# Patient Record
Sex: MALE | Race: WHITE | URBAN - METROPOLITAN AREA
[De-identification: names, ages, dates, MRNs, and addresses within clinical notes are randomized per-mention and may not be internally consistent; named-entity substitution may affect disease eponyms.]

---

## 2018-06-18 ENCOUNTER — INPATIENT (INPATIENT)
Facility: HOSPITAL | Age: 83
LOS: 2 days | Discharge: HOME | End: 2018-06-21
Attending: INTERNAL MEDICINE | Admitting: INTERNAL MEDICINE
Payer: MEDICARE

## 2018-06-18 VITALS
DIASTOLIC BLOOD PRESSURE: 71 MMHG | SYSTOLIC BLOOD PRESSURE: 146 MMHG | OXYGEN SATURATION: 98 % | TEMPERATURE: 98 F | RESPIRATION RATE: 17 BRPM | HEART RATE: 66 BPM | HEIGHT: 70 IN | WEIGHT: 237 LBS

## 2018-06-18 DIAGNOSIS — L98.499 NON-PRESSURE CHRONIC ULCER OF SKIN OF OTHER SITES WITH UNSPECIFIED SEVERITY: ICD-10-CM

## 2018-06-18 DIAGNOSIS — C61 MALIGNANT NEOPLASM OF PROSTATE: ICD-10-CM

## 2018-06-18 DIAGNOSIS — I48.91 UNSPECIFIED ATRIAL FIBRILLATION: ICD-10-CM

## 2018-06-18 DIAGNOSIS — H40.9 UNSPECIFIED GLAUCOMA: ICD-10-CM

## 2018-06-18 DIAGNOSIS — E78.5 HYPERLIPIDEMIA, UNSPECIFIED: ICD-10-CM

## 2018-06-18 DIAGNOSIS — I44.0 ATRIOVENTRICULAR BLOCK, FIRST DEGREE: ICD-10-CM

## 2018-06-18 DIAGNOSIS — I48.1 PERSISTENT ATRIAL FIBRILLATION: ICD-10-CM

## 2018-06-18 LAB
ANION GAP SERPL CALC-SCNC: 3 MMOL/L — LOW (ref 5–17)
APTT BLD: 38.5 SEC — HIGH (ref 27.5–37.4)
BUN SERPL-MCNC: 22 MG/DL — SIGNIFICANT CHANGE UP (ref 7–23)
CALCIUM SERPL-MCNC: 8.8 MG/DL — SIGNIFICANT CHANGE UP (ref 8.5–10.1)
CHLORIDE SERPL-SCNC: 103 MMOL/L — SIGNIFICANT CHANGE UP (ref 96–108)
CO2 SERPL-SCNC: 33 MMOL/L — HIGH (ref 22–31)
CREAT SERPL-MCNC: 1.08 MG/DL — SIGNIFICANT CHANGE UP (ref 0.5–1.3)
GLUCOSE SERPL-MCNC: 99 MG/DL — SIGNIFICANT CHANGE UP (ref 70–99)
HCT VFR BLD CALC: 47.6 % — SIGNIFICANT CHANGE UP (ref 39–50)
HGB BLD-MCNC: 15.3 G/DL — SIGNIFICANT CHANGE UP (ref 13–17)
INR BLD: 1.87 RATIO — HIGH (ref 0.88–1.16)
MAGNESIUM SERPL-MCNC: 2.4 MG/DL — SIGNIFICANT CHANGE UP (ref 1.6–2.6)
MCHC RBC-ENTMCNC: 27.5 PG — SIGNIFICANT CHANGE UP (ref 27–34)
MCHC RBC-ENTMCNC: 32.1 GM/DL — SIGNIFICANT CHANGE UP (ref 32–36)
MCV RBC AUTO: 85.5 FL — SIGNIFICANT CHANGE UP (ref 80–100)
NRBC # BLD: 0 /100 WBCS — SIGNIFICANT CHANGE UP (ref 0–0)
PLATELET # BLD AUTO: 197 K/UL — SIGNIFICANT CHANGE UP (ref 150–400)
POTASSIUM SERPL-MCNC: 4.5 MMOL/L — SIGNIFICANT CHANGE UP (ref 3.5–5.3)
POTASSIUM SERPL-SCNC: 4.5 MMOL/L — SIGNIFICANT CHANGE UP (ref 3.5–5.3)
PROTHROM AB SERPL-ACNC: 20.4 SEC — HIGH (ref 9.8–12.7)
RBC # BLD: 5.57 M/UL — SIGNIFICANT CHANGE UP (ref 4.2–5.8)
RBC # FLD: 14.2 % — SIGNIFICANT CHANGE UP (ref 10.3–14.5)
SODIUM SERPL-SCNC: 139 MMOL/L — SIGNIFICANT CHANGE UP (ref 135–145)
WBC # BLD: 5.87 K/UL — SIGNIFICANT CHANGE UP (ref 3.8–10.5)
WBC # FLD AUTO: 5.87 K/UL — SIGNIFICANT CHANGE UP (ref 3.8–10.5)

## 2018-06-18 PROCEDURE — 93010 ELECTROCARDIOGRAM REPORT: CPT

## 2018-06-18 RX ORDER — FINASTERIDE 5 MG/1
5 TABLET, FILM COATED ORAL DAILY
Qty: 0 | Refills: 0 | Status: DISCONTINUED | OUTPATIENT
Start: 2018-06-18 | End: 2018-06-21

## 2018-06-18 RX ORDER — ATORVASTATIN CALCIUM 80 MG/1
10 TABLET, FILM COATED ORAL AT BEDTIME
Qty: 0 | Refills: 0 | Status: DISCONTINUED | OUTPATIENT
Start: 2018-06-18 | End: 2018-06-21

## 2018-06-18 RX ORDER — DOFETILIDE 0.25 MG/1
500 CAPSULE ORAL
Qty: 0 | Refills: 0 | Status: DISCONTINUED | OUTPATIENT
Start: 2018-06-18 | End: 2018-06-21

## 2018-06-18 RX ORDER — ACETAMINOPHEN 500 MG
650 TABLET ORAL EVERY 6 HOURS
Qty: 0 | Refills: 0 | Status: DISCONTINUED | OUTPATIENT
Start: 2018-06-18 | End: 2018-06-21

## 2018-06-18 RX ORDER — TIMOLOL 0.5 %
1 DROPS OPHTHALMIC (EYE) DAILY
Qty: 0 | Refills: 0 | Status: DISCONTINUED | OUTPATIENT
Start: 2018-06-18 | End: 2018-06-21

## 2018-06-18 RX ORDER — ATORVASTATIN CALCIUM 80 MG/1
10 TABLET, FILM COATED ORAL AT BEDTIME
Qty: 0 | Refills: 0 | Status: DISCONTINUED | OUTPATIENT
Start: 2018-06-18 | End: 2018-06-18

## 2018-06-18 RX ORDER — DOFETILIDE 0.25 MG/1
500 CAPSULE ORAL ONCE
Qty: 0 | Refills: 0 | Status: COMPLETED | OUTPATIENT
Start: 2018-06-18 | End: 2018-06-18

## 2018-06-18 RX ORDER — WARFARIN SODIUM 2.5 MG/1
5 TABLET ORAL DAILY
Qty: 0 | Refills: 0 | Status: DISCONTINUED | OUTPATIENT
Start: 2018-06-18 | End: 2018-06-19

## 2018-06-18 RX ADMIN — DOFETILIDE 500 MICROGRAM(S): 0.25 CAPSULE ORAL at 13:59

## 2018-06-18 RX ADMIN — ATORVASTATIN CALCIUM 10 MILLIGRAM(S): 80 TABLET, FILM COATED ORAL at 21:20

## 2018-06-18 RX ADMIN — Medication 650 MILLIGRAM(S): at 23:45

## 2018-06-18 NOTE — H&P ADULT - HISTORY OF PRESENT ILLNESS
83 M with Hx of Atrial Fibrillation/Atrial Flutter on A/C with Coumadin presented to the hospital for elective ANANT/cardioversion and then admission for inpatient Tikosyn loading.  Patient denies any shortness of breath, chest pain, chest pressure, palpitations, dizziness or lightheadedness.  He underwent the ANANT today with cardioversion to NSR.  He is currently asymptomatic and feels well.  EKG has confirmed conversion to NSR with 1st degree AVB.  He will now require loading with Tikosyn and inpatient admission for monitor of QTc and bradycardia.     PAST MEDICAL HISTORY:  Atrial Fibrillation/Atrial Flutter on A/C with Coumadin  Prostate Cancer, on Hormonal therapy  Hyperlipidemia  Glaucoma  Chronic left leg wound, follows up at the wound care center  Negative outpatient nuclear stress test    PAST SURGICAL HISTORY:  s/p appendectomy    FAMILY HISTORY:   non-contributory to the patient's current presentation 83 M with Hx of Atrial Fibrillation/Atrial Flutter on A/C with Coumadin presented to the hospital for elective ANANT/cardioversion and then admission for inpatient Tikosyn loading.  Patient denies any shortness of breath, chest pain, chest pressure, palpitations, dizziness or lightheadedness.  He underwent the ANANT today with cardioversion to NSR.  He is currently asymptomatic and feels well.  EKG has confirmed conversion to NSR with 1st degree AVB.  He will now require loading with Tikosyn and inpatient admission for monitor of QTc and bradycardia.     PAST MEDICAL HISTORY:  Atrial Fibrillation/Atrial Flutter on A/C with Coumadin  Prostate Cancer, on Hormonal therapy  Hyperlipidemia  Glaucoma  Chronic left leg wound/ulcer, s/p Becca boot, follows up at the wound care center  Negative outpatient nuclear stress test    PAST SURGICAL HISTORY:  s/p appendectomy    FAMILY HISTORY:   non-contributory to the patient's current presentation

## 2018-06-18 NOTE — H&P ADULT - NSHPLABSRESULTS_GEN_ALL_CORE
15.3   5.87  )-----------( 197      ( 18 Jun 2018 10:45 )             47.6     06-18    139  |  103  |  22  ----------------------------<  99  4.5   |  33<H>  |  1.08    Ca    8.8      18 Jun 2018 10:45  Mg     2.4     06-18    PT/INR - ( 18 Jun 2018 10:45 )   PT: 20.4 sec;   INR: 1.87 ratio      PTT - ( 18 Jun 2018 10:45 )  PTT:38.5 sec    EKG at 10:53AM:   Atrial Flutter at 65 bpm, normal axis, normal RW progression, no acute ischemic changes, QTc 405    EKG at 12:48pm after cardioversion:  NSR at 66bpm, normal axis, normal RW progression, 1st degree AVB, QTc 425

## 2018-06-18 NOTE — H&P ADULT - NSHPPHYSICALEXAM_GEN_ALL_CORE
VITALS:  T(F): 97.8 (06-18-18 @ 12:50), Max: 98.2 (06-18-18 @ 10:34)  HR: 64 (06-18-18 @ 13:35) (64 - 70)  BP: 108/72 (06-18-18 @ 13:35) (106/71 - 146/71)  RR: 16 (06-18-18 @ 13:35) (16 - 18)  SpO2: 96% (06-18-18 @ 13:35) (96% - 100%)    PHYSICAL EXAM:    HEENT:  pupils equal and reactive, EOMI, no oropharyngeal lesions, erythema, exudates, oral thrush    NECK:   supple, no carotid bruits, no palpable lymph nodes, no thyromegaly    CV:  +S1, +S2, regular, no murmurs or rubs    RESP:   lungs clear to auscultation bilaterally, no wheezing, rales, rhonchi, good air entry bilaterally    BREAST:  not examined    GI:  abdomen soft, non-tender, non-distended, normal BS, no bruits, no abdominal masses, no palpable masses    RECTAL:  not examined    :  not examined    MSK:   normal muscle tone, no atrophy, no rigidity, no contractions    EXT:   no clubbing, no cyanosis, no edema, no calf pain, swelling or erythema, left leg wrapped with dressing    VASCULAR:  pulses equal and symmetric in the upper and lower extremities    NEURO:  AAOX3, no focal neurological deficits, follows all commands, able to move extremities spontaneously    SKIN:  no lesions, no rashes VITALS:  T(F): 97.8 (06-18-18 @ 12:50), Max: 98.2 (06-18-18 @ 10:34)  HR: 64 (06-18-18 @ 13:35) (64 - 70)  BP: 108/72 (06-18-18 @ 13:35) (106/71 - 146/71)  RR: 16 (06-18-18 @ 13:35) (16 - 18)  SpO2: 96% (06-18-18 @ 13:35) (96% - 100%)    PHYSICAL EXAM:    HEENT:  pupils equal and reactive, EOMI, no oropharyngeal lesions, erythema, exudates, oral thrush    NECK:   supple, no carotid bruits, no palpable lymph nodes, no thyromegaly    CV:  +S1, +S2, regular, no murmurs or rubs    RESP:   lungs clear to auscultation bilaterally, no wheezing, rales, rhonchi, good air entry bilaterally    BREAST:  not examined    GI:  abdomen soft, non-tender, non-distended, normal BS, no bruits, no abdominal masses, no palpable masses    RECTAL:  not examined    :  not examined    MSK:   normal muscle tone, no atrophy, no rigidity, no contractions    EXT:   no clubbing, no cyanosis, no edema, no calf pain, swelling or erythema, left leg wrapped with Becca boot    VASCULAR:  pulses equal and symmetric in the upper and lower extremities    NEURO:  AAOX3, no focal neurological deficits, follows all commands, able to move extremities spontaneously    SKIN:  no lesions, no rashes

## 2018-06-18 NOTE — PROCEDURAL SAFETY CHECKLIST WITH OR WITHOUT SEDATION - NSPREPROCLOCFT_GEN_ALL_CORE
Received pt post procedure. A+OX4, awake, alert and without pain. VSS on cardiac monitor. R Radial band intact. No bleeding, hematoma, brusing, or tenderness noted at site. Safety maintained. Family present. lab

## 2018-06-18 NOTE — H&P ADULT - ASSESSMENT
PERSISTENT ATRIAL FIBRILLATION/FLUTTER, S/P ELECTIVE ANANT/CARDIOVERSION TO NSR, NOW REQUIRES INPATIENT ADMISSION FOR TIKOSYN LOADING AND MONITORING    SUBTHERAPEUTIC INR ON COUMADIN    HYPERLIPIDEMIA    PROSTATE CANCER, ON HORMONAL THERAPY    GLAUCOMA      PLAN:  -  admit to inpatient, tele, hospitalist service  -  daily PT/INR  -  check labs in am  -  EP consult - Dr. Smart  -  resume outpatient medications  -  daily EKG to assess QTc  -  DVT prophylaxis - on Coumadin  -  will likely be in the hospital for 2-3 days  -  Tikosyn loading/dosing/ordering to be done by EP service  -  dressing to the left leg (will assess wound once arrives to floor)    d/w patient, EP NP and cath recovery RN PERSISTENT ATRIAL FIBRILLATION/FLUTTER, S/P ELECTIVE ANANT/CARDIOVERSION TO NSR, NOW REQUIRES INPATIENT ADMISSION FOR TIKOSYN LOADING AND MONITORING    SUBTHERAPEUTIC INR ON COUMADIN    HYPERLIPIDEMIA    PROSTATE CANCER, ON HORMONAL THERAPY    GLAUCOMA    CHRONIC LEFT LEG WOUND/ULCER, S/P BECCA BOOT, PRESENT ON ADMISSION      PLAN:  -  admit to inpatient, tele, hospitalist service  -  daily PT/INR  -  check labs in am  -  EP consult - Dr. Smart  -  resume outpatient medications  -  daily EKG to assess QTc  -  DVT prophylaxis - on Coumadin  -  will likely be in the hospital for 2-3 days  -  Tikosyn loading/dosing/ordering to be done by EP service  -  continue left leg Becca boot, patient has follow up appt at the wound care center on Thursday of this week    d/w patient, EP NP and cath recovery RN

## 2018-06-19 LAB
ANION GAP SERPL CALC-SCNC: 5 MMOL/L — SIGNIFICANT CHANGE UP (ref 5–17)
ANION GAP SERPL CALC-SCNC: 5 MMOL/L — SIGNIFICANT CHANGE UP (ref 5–17)
BUN SERPL-MCNC: 20 MG/DL — SIGNIFICANT CHANGE UP (ref 7–23)
BUN SERPL-MCNC: 21 MG/DL — SIGNIFICANT CHANGE UP (ref 7–23)
CALCIUM SERPL-MCNC: 8.5 MG/DL — SIGNIFICANT CHANGE UP (ref 8.5–10.1)
CALCIUM SERPL-MCNC: 8.8 MG/DL — SIGNIFICANT CHANGE UP (ref 8.5–10.1)
CHLORIDE SERPL-SCNC: 103 MMOL/L — SIGNIFICANT CHANGE UP (ref 96–108)
CHLORIDE SERPL-SCNC: 105 MMOL/L — SIGNIFICANT CHANGE UP (ref 96–108)
CO2 SERPL-SCNC: 29 MMOL/L — SIGNIFICANT CHANGE UP (ref 22–31)
CO2 SERPL-SCNC: 32 MMOL/L — HIGH (ref 22–31)
CREAT SERPL-MCNC: 0.85 MG/DL — SIGNIFICANT CHANGE UP (ref 0.5–1.3)
CREAT SERPL-MCNC: 0.94 MG/DL — SIGNIFICANT CHANGE UP (ref 0.5–1.3)
GLUCOSE SERPL-MCNC: 116 MG/DL — HIGH (ref 70–99)
GLUCOSE SERPL-MCNC: 84 MG/DL — SIGNIFICANT CHANGE UP (ref 70–99)
INR BLD: 1.86 RATIO — HIGH (ref 0.88–1.16)
MAGNESIUM SERPL-MCNC: 2.4 MG/DL — SIGNIFICANT CHANGE UP (ref 1.6–2.6)
POTASSIUM SERPL-MCNC: 4 MMOL/L — SIGNIFICANT CHANGE UP (ref 3.5–5.3)
POTASSIUM SERPL-MCNC: 4.2 MMOL/L — SIGNIFICANT CHANGE UP (ref 3.5–5.3)
POTASSIUM SERPL-SCNC: 4 MMOL/L — SIGNIFICANT CHANGE UP (ref 3.5–5.3)
POTASSIUM SERPL-SCNC: 4.2 MMOL/L — SIGNIFICANT CHANGE UP (ref 3.5–5.3)
PROTHROM AB SERPL-ACNC: 20.3 SEC — HIGH (ref 9.8–12.7)
SODIUM SERPL-SCNC: 139 MMOL/L — SIGNIFICANT CHANGE UP (ref 135–145)
SODIUM SERPL-SCNC: 140 MMOL/L — SIGNIFICANT CHANGE UP (ref 135–145)

## 2018-06-19 PROCEDURE — 93010 ELECTROCARDIOGRAM REPORT: CPT | Mod: 76

## 2018-06-19 RX ORDER — WARFARIN SODIUM 2.5 MG/1
7 TABLET ORAL ONCE
Qty: 0 | Refills: 0 | Status: COMPLETED | OUTPATIENT
Start: 2018-06-19 | End: 2018-06-19

## 2018-06-19 RX ADMIN — Medication 650 MILLIGRAM(S): at 22:13

## 2018-06-19 RX ADMIN — FINASTERIDE 5 MILLIGRAM(S): 5 TABLET, FILM COATED ORAL at 08:27

## 2018-06-19 RX ADMIN — DOFETILIDE 500 MICROGRAM(S): 0.25 CAPSULE ORAL at 02:01

## 2018-06-19 RX ADMIN — DOFETILIDE 500 MICROGRAM(S): 0.25 CAPSULE ORAL at 20:14

## 2018-06-19 RX ADMIN — WARFARIN SODIUM 7 MILLIGRAM(S): 2.5 TABLET ORAL at 21:24

## 2018-06-19 RX ADMIN — WARFARIN SODIUM 5 MILLIGRAM(S): 2.5 TABLET ORAL at 07:07

## 2018-06-19 RX ADMIN — DOFETILIDE 500 MICROGRAM(S): 0.25 CAPSULE ORAL at 08:27

## 2018-06-19 RX ADMIN — Medication 1 DROP(S): at 12:03

## 2018-06-19 NOTE — PROGRESS NOTE ADULT - SUBJECTIVE AND OBJECTIVE BOX
Pt remains on Tikosyn.  Qtc this am was 446 ms.  Pt will continue on Tikosyn.  Please continue to monitor lytes and Serum Cr., along with Qtc  Continue with Telemetry

## 2018-06-19 NOTE — CONSULT NOTE ADULT - ASSESSMENT
83 M with Hx of Atrial Fibrillation/Atrial Flutter on A/C with Coumadin presented to the hospital for elective ANANT/cardioversion and then admission for inpatient Tikosyn loading.     1. Atrial fibrillation- now in SR s/p ANANT/CV. Continue Tikosyn loading with an EKG prior to dosing. Will need 2-3 day hospitalization in total.   Cont tele- in SR.     2. Anticoagulation- cont coumadin for a goal INR of 2-3. Cont to follow daily.     3. Dyslipidemia- cont statin.     4. DVT proph. Replete lytes as needed.

## 2018-06-19 NOTE — PROGRESS NOTE ADULT - SUBJECTIVE AND OBJECTIVE BOX
Reason for Admission: ANANT/cardioversion and inpatient Tikosyn loading    History of Present Illness:       83 M with Hx of Atrial Fibrillation/Atrial Flutter on A/C with Coumadin presented to the hospital for elective ANANT/cardioversion and then admission for inpatient Tikosyn loading.  Patient denies any shortness of breath, chest pain, chest pressure, palpitations, dizziness or lightheadedness.  He underwent the ANANT today with cardioversion to NSR.  He is currently asymptomatic and feels well.  EKG has confirmed conversion to NSR with 1st degree AVB.  He will now require loading with Tikosyn and inpatient admission for monitor of QTc and bradycardia.     6/19: No complaints, HD stable. denies fever, chills, N, V, CP, SOB. No events on tele, tolerating Tikosyn     REVIEW OF SYSTEMS: All other review of systems is negative unless indicated above.      Physical Exam:  Vital Signs Last 24 Hrs  T(C): 36.8 (19 Jun 2018 10:15), Max: 36.8 (19 Jun 2018 10:15)  T(F): 98.3 (19 Jun 2018 10:15), Max: 98.3 (19 Jun 2018 10:15)  HR: 57 (19 Jun 2018 10:15) (54 - 84)  BP: 126/56 (19 Jun 2018 10:15) (119/66 - 128/61)  BP(mean): --  RR: 17 (19 Jun 2018 10:15) (16 - 18)  SpO2: 99% (19 Jun 2018 10:15) (96% - 99%)    PHYSICAL EXAM:    Constitutional: NAD, awake and alert, well-developed  HEENT: PERR, EOMI, Normal Hearing, MMM  Neck: Soft and supple  Respiratory: Breath sounds are clear bilaterally, No wheezing, rales or rhonchi  Cardiovascular: S1 and S2, regular rate and rhythm, no Murmurs, gallops or rubs  Gastrointestinal: Bowel Sounds present, soft, nontender, nondistended, no guarding, no rebound  Extremities: No peripheral edema  Neurological: A/O x 3, no focal deficits in my limited exam  Skin: No rashes              MEDICATIONS  (STANDING):  atorvastatin 10 milliGRAM(s) Oral at bedtime  dofetilide 500 MICROGram(s) Oral <User Schedule>  finasteride 5 milliGRAM(s) Oral daily  timolol 0.25% Solution 1 Drop(s) Both EYES daily  warfarin 7 milliGRAM(s) Oral once    MEDICATIONS  (PRN):  acetaminophen   Tablet 650 milliGRAM(s) Oral every 6 hours PRN For Temp greater than 38 C (100.4 F)                              15.3   5.87  )-----------( 197      ( 18 Jun 2018 10:45 )             47.6     06-19    140  |  103  |  20  ----------------------------<  116<H>  4.0   |  32<H>  |  0.94    Ca    8.8      19 Jun 2018 06:11  Mg     2.4     06-19      CAPILLARY BLOOD GLUCOSE          PT/INR - ( 19 Jun 2018 06:11 )   PT: 20.3 sec;   INR: 1.86 ratio         PTT - ( 18 Jun 2018 10:45 )  PTT:38.5 sec            Assessment and Plan:     PERSISTENT ATRIAL FIBRILLATION/FLUTTER, S/P ELECTIVE ANANT/CARDIOVERSION TO NSR, NOW REQUIRES INPATIENT ADMISSION FOR TIKOSYN LOADING AND MONITORING    SUBTHERAPEUTIC INR ON COUMADIN    HYPERLIPIDEMIA    PROSTATE CANCER, ON HORMONAL THERAPY    GLAUCOMA    CHRONIC LEFT LEG WOUND/ULCER, S/P BECCA BOOT, PRESENT ON ADMISSION      PLAN:  -  daily PT/INR  -  EP consult - Dr. Smart appreciated - c/w tikosyn.   -  c/w outpatient medications  -  daily EKG to assess QTc  -  Tikosyn loading/dosing/ordering to be done by EP service  -  continue left leg Becca boot, patient has follow up appt at the wound care center on Thursday of this week  -  c/w coumadin for goal INR 2-3, increase to 6mg tonight.

## 2018-06-19 NOTE — CONSULT NOTE ADULT - SUBJECTIVE AND OBJECTIVE BOX
Cardiology Consultation    HPI: 83 M with Hx of Atrial Fibrillation/Atrial Flutter on A/C with Coumadin presented to the hospital for elective ANANT/cardioversion and then admission for inpatient Tikosyn loading.  Patient denies any shortness of breath, chest pain, chest pressure, palpitations, dizziness or lightheadedness.  He underwent the ANANT today with cardioversion to NSR.  He is currently asymptomatic and feels well.  EKG has confirmed conversion to NSR with 1st degree AVB.  He will now require loading with Tikosyn and inpatient admission for monitor of QTc and bradycardia.     6/19- SR on tele, HR 60-80. No CP/SOB. Receiving Tikosyn 8am/8pm. EKG today- QTc 445ms.    PAST MEDICAL HISTORY:  Atrial Fibrillation/Atrial Flutter on A/C with Coumadin  Prostate Cancer, on Hormonal therapy  Hyperlipidemia  Glaucoma  Chronic left leg wound/ulcer, s/p Becca boot, follows up at the wound care center  Negative outpatient nuclear stress test    PAST SURGICAL HISTORY:  s/p appendectomy    FAMILY HISTORY:   non-contributory to the patient's current presentation (18 Jun 2018 13:36)    Allergies  No Known Allergies    SOCIAL HISTORY: Denies tobacco, etoh abuse or illicit drug use    FAMILY HISTORY: Noncontributory    MEDICATIONS  (STANDING):  atorvastatin 10 milliGRAM(s) Oral at bedtime  dofetilide 500 MICROGram(s) Oral <User Schedule>  finasteride 5 milliGRAM(s) Oral daily  timolol 0.25% Solution 1 Drop(s) Both EYES daily  warfarin 5 milliGRAM(s) Oral daily    MEDICATIONS  (PRN):  acetaminophen   Tablet 650 milliGRAM(s) Oral every 6 hours PRN For Temp greater than 38 C (100.4 F)      Vital Signs Last 24 Hrs  T(C): 36.4 (19 Jun 2018 04:43), Max: 36.8 (18 Jun 2018 10:34)  T(F): 97.6 (19 Jun 2018 04:43), Max: 98.2 (18 Jun 2018 10:34)  HR: 54 (19 Jun 2018 04:43) (54 - 84)  BP: 119/66 (19 Jun 2018 04:43) (101/83 - 146/71)  BP(mean): --  RR: 18 (19 Jun 2018 04:43) (16 - 18)  SpO2: 96% (19 Jun 2018 04:43) (96% - 100%)    REVIEW OF SYSTEMS:    CONSTITUTIONAL:  As per HPI.  HEENT:  Eyes:  No diplopia or blurred vision. ENT:  No earache, sore throat or runny nose.  CARDIOVASCULAR:  No pressure, squeezing, strangling, tightness, heaviness or aching about the chest, neck, axilla or epigastrium.  RESPIRATORY:  No cough, shortness of breath, PND or orthopnea.  GASTROINTESTINAL:  No nausea, vomiting or diarrhea.  GENITOURINARY:  No dysuria, frequency or urgency.  MUSCULOSKELETAL:  As per HPI.  SKIN:  No change in skin, hair or nails.  NEUROLOGIC:  No paresthesias, fasciculations, seizures or weakness.  PSYCHIATRIC:  No disorder of thought or mood.  ENDOCRINE:  No heat or cold intolerance, polyuria or polydipsia.  HEMATOLOGICAL:  No easy bruising or bleedings.     PHYSICAL EXAMINATION:    GENERAL APPEARANCE:  Pt. is not currently dyspneic, in no distress. Pt. is alert, oriented, and pleasant.  HEENT:  Pupils are normal and react normally. No icterus. Mucous membranes well colored.  NECK:  Supple. No lymphadenopathy. Jugular venous pressure not elevated. Carotids equal.   HEART:   The cardiac impulse has a normal quality. There are no murmurs, rubs or gallops noted  CHEST:  Chest is clear to auscultation. Normal respiratory effort.  ABDOMEN:  Soft and nontender.   EXTREMITIES:  There is no edema.   SKIN:  No rash or significant lesions are noted.    I&O's Summary    18 Jun 2018 07:01  -  19 Jun 2018 07:00  --------------------------------------------------------  IN: 360 mL / OUT: 400 mL / NET: -40 mL        LABS:                        15.3   5.87  )-----------( 197      ( 18 Jun 2018 10:45 )             47.6     06-19    140  |  103  |  20  ----------------------------<  116<H>  4.0   |  32<H>  |  0.94    Ca    8.8      19 Jun 2018 06:11  Mg     2.4     06-19    PT/INR - ( 19 Jun 2018 06:11 )   PT: 20.3 sec;   INR: 1.86 ratio         PTT - ( 18 Jun 2018 10:45 )  PTT:38.5 sec    EKG: SB @ 56. APC. No acute ST changes. Normal intervals.     TELEMETRY: SR    CARDIAC TESTS: pending    RADIOLOGY & ADDITIONAL STUDIES: pending    ASSESSMENT & PLAN:

## 2018-06-20 LAB
ANION GAP SERPL CALC-SCNC: 4 MMOL/L — LOW (ref 5–17)
BUN SERPL-MCNC: 22 MG/DL — SIGNIFICANT CHANGE UP (ref 7–23)
CALCIUM SERPL-MCNC: 8.7 MG/DL — SIGNIFICANT CHANGE UP (ref 8.5–10.1)
CHLORIDE SERPL-SCNC: 102 MMOL/L — SIGNIFICANT CHANGE UP (ref 96–108)
CO2 SERPL-SCNC: 33 MMOL/L — HIGH (ref 22–31)
CREAT SERPL-MCNC: 0.98 MG/DL — SIGNIFICANT CHANGE UP (ref 0.5–1.3)
GLUCOSE SERPL-MCNC: 113 MG/DL — HIGH (ref 70–99)
INR BLD: 2.17 RATIO — HIGH (ref 0.88–1.16)
POTASSIUM SERPL-MCNC: 4.1 MMOL/L — SIGNIFICANT CHANGE UP (ref 3.5–5.3)
POTASSIUM SERPL-SCNC: 4.1 MMOL/L — SIGNIFICANT CHANGE UP (ref 3.5–5.3)
PROTHROM AB SERPL-ACNC: 23.8 SEC — HIGH (ref 9.8–12.7)
SODIUM SERPL-SCNC: 139 MMOL/L — SIGNIFICANT CHANGE UP (ref 135–145)

## 2018-06-20 PROCEDURE — 93010 ELECTROCARDIOGRAM REPORT: CPT | Mod: 76

## 2018-06-20 RX ORDER — WARFARIN SODIUM 2.5 MG/1
5 TABLET ORAL ONCE
Qty: 0 | Refills: 0 | Status: COMPLETED | OUTPATIENT
Start: 2018-06-20 | End: 2018-06-20

## 2018-06-20 RX ADMIN — FINASTERIDE 5 MILLIGRAM(S): 5 TABLET, FILM COATED ORAL at 11:49

## 2018-06-20 RX ADMIN — Medication 1 DROP(S): at 11:50

## 2018-06-20 RX ADMIN — DOFETILIDE 500 MICROGRAM(S): 0.25 CAPSULE ORAL at 19:54

## 2018-06-20 RX ADMIN — WARFARIN SODIUM 5 MILLIGRAM(S): 2.5 TABLET ORAL at 21:12

## 2018-06-20 RX ADMIN — DOFETILIDE 500 MICROGRAM(S): 0.25 CAPSULE ORAL at 08:23

## 2018-06-20 RX ADMIN — Medication 650 MILLIGRAM(S): at 22:33

## 2018-06-20 NOTE — PROGRESS NOTE ADULT - SUBJECTIVE AND OBJECTIVE BOX
Reason for Admission: ANANT/cardioversion and inpatient Tikosyn loading	  History of Present Illness: 	    83 M with Hx of Atrial Fibrillation/Atrial Flutter on A/C with Coumadin presented to the hospital for elective ANANT/cardioversion and then admission for inpatient Tikosyn loading.  Patient denies any shortness of breath, chest pain, chest pressure, palpitations, dizziness or lightheadedness.  He underwent the ANANT today with cardioversion to NSR.  He is currently asymptomatic and feels well.  EKG has confirmed conversion to NSR with 1st degree AVB.  He will now require loading with Tikosyn and inpatient admission for monitor of QTc and bradycardia.     6/19: No complaints, HD stable. denies fever, chills, N, V, CP, SOB. No events on tele, tolerating Tikosyn   06/20/18: Patient seen and examined. No new complaints.     REVIEW OF SYSTEMS: All other review of systems is negative unless indicated above.      Physical Exam:  Vital Signs Last 24 Hrs T(C): 36.6 (20 Jun 2018 12:17), Max: 36.7 (20 Jun 2018 04:37) T(F): 97.9 (20 Jun 2018 12:17), Max: 98 (20 Jun 2018 04:37) HR: 57 (20 Jun 2018 12:17) (54 - 57) BP: 139/68 (20 Jun 2018 12:17) (122/60 - 139/68) BP(mean): -- RR: 18 (20 Jun 2018 12:17) (18 - 18) SpO2: 97% (20 Jun 2018 12:17) (97% - 99%) PHYSICAL EXAM:  Constitutional: NAD, awake and alert, well-developed HEENT: PERR, EOMI, Normal Hearing, MMM Neck: Soft and supple Respiratory: Breath sounds are clear bilaterally, No wheezing, rales or rhonchi Cardiovascular: S1 and S2, regular rate and rhythm, no Murmurs, gallops or rubs Gastrointestinal: Bowel Sounds present, soft, nontender, nondistended, no guarding, no rebound Extremities: No peripheral edema Neurological: A/O x 3, no focal deficits in my limited exam Skin: No rashes  	  	      MEDICATIONS  (STANDING):  atorvastatin 10 milliGRAM(s) Oral at bedtime  dofetilide 500 MICROGram(s) Oral <User Schedule>  finasteride 5 milliGRAM(s) Oral daily  timolol 0.25% Solution 1 Drop(s) Both EYES daily    MEDICATIONS  (PRN):  acetaminophen   Tablet 650 milliGRAM(s) Oral every 6 hours PRN For Temp greater than 38 C (100.4 F)          Labs:                    20 Jun 2018 05:53    139    |  102    |  22     ----------------------------<  113    4.1     |  33     |  0.98     Ca    8.7        20 Jun 2018 05:53  Mg     2.4       19 Jun 2018 06:11        PT/INR - ( 20 Jun 2018 05:53 )   PT: 23.8 sec;   INR: 2.17 ratio           CAPILLARY BLOOD GLUCOSE                        Assessment and Plan:     PERSISTENT ATRIAL FIBRILLATION/FLUTTER, S/P ELECTIVE ANANT/CARDIOVERSION TO NSR, NOW REQUIRES INPATIENT ADMISSION FOR TIKOSYN LOADING AND MONITORING    S/P SUBTHERAPEUTIC INR ON COUMADIN    HYPERLIPIDEMIA    PROSTATE CANCER, ON HORMONAL THERAPY    GLAUCOMA    CHRONIC LEFT LEG WOUND/ULCER, S/P BECCA BOOT, PRESENT ON ADMISSION      PLAN:  -  daily PT/INR. INR therapeutic today. Continue couamdin  -  EP consult - Dr. Smart appreciated - c/w tikosyn.   -  c/w outpatient medications  -  daily EKG to assess QTc  -  Tikosyn loading/dosing/ordering to be done by EP service  -  continue left leg Becca boot, patient has follow up appt at the wound care center on Thursday of this week

## 2018-06-21 ENCOUNTER — TRANSCRIPTION ENCOUNTER (OUTPATIENT)
Age: 83
End: 2018-06-21

## 2018-06-21 VITALS
TEMPERATURE: 98 F | DIASTOLIC BLOOD PRESSURE: 75 MMHG | OXYGEN SATURATION: 98 % | RESPIRATION RATE: 18 BRPM | SYSTOLIC BLOOD PRESSURE: 147 MMHG | HEART RATE: 54 BPM

## 2018-06-21 LAB
INR BLD: 2.49 RATIO — HIGH (ref 0.88–1.16)
PROTHROM AB SERPL-ACNC: 27.4 SEC — HIGH (ref 9.8–12.7)

## 2018-06-21 PROCEDURE — 93010 ELECTROCARDIOGRAM REPORT: CPT | Mod: 76

## 2018-06-21 RX ORDER — DOFETILIDE 0.25 MG/1
1 CAPSULE ORAL
Qty: 60 | Refills: 0
Start: 2018-06-21 | End: 2018-07-20

## 2018-06-21 RX ADMIN — FINASTERIDE 5 MILLIGRAM(S): 5 TABLET, FILM COATED ORAL at 12:15

## 2018-06-21 RX ADMIN — Medication 1 DROP(S): at 12:15

## 2018-06-21 RX ADMIN — DOFETILIDE 500 MICROGRAM(S): 0.25 CAPSULE ORAL at 08:51

## 2018-06-21 NOTE — DISCHARGE NOTE ADULT - MEDICATION SUMMARY - MEDICATIONS TO TAKE
I will START or STAY ON the medications listed below when I get home from the hospital:    finasteride 5 mg oral tablet  -- 1 tab(s) by mouth once a day  -- Indication: For BPH    dofetilide 500 mcg oral capsule  -- 1 cap(s) by mouth every 12 hours   -- Indication: For A Fib    Coumadin 5 mg oral tablet  -- 1 tab(s) by mouth once a day  -- Indication: For A Fib    atorvastatin 10 mg oral tablet  -- 1 tab(s) by mouth once a day  -- Indication: For HLD    timolol maleate 0.25% ophthalmic solution  -- 1 drop(s) to each affected eye once a day  -- Indication: For glaucoma

## 2018-06-21 NOTE — DISCHARGE NOTE ADULT - PATIENT PORTAL LINK FT
You can access the [facility] Patient Portal, offered by [facility], by registering with the following website: [email or web address]

## 2018-06-21 NOTE — DISCHARGE NOTE ADULT - CARE PROVIDER_API CALL
Jonah Christy), Internal Medicine  [address] [place] [address]  Phone: [phone]  Fax: [phone]    Stanislav Smart), Cardiac Electrophysiology; Cardiovascular Disease; Internal Medicine  [address] [place] [address]  Phone: [phone]  Fax: [phone]

## 2018-06-21 NOTE — DISCHARGE NOTE ADULT - CARE PLAN
Principal Discharge DX:	Atrial fibrillation, unspecified type  Goal:	to keep in SR  Assessment and plan of treatment:	Follow up with Dr Smart  INR in 3 days abd PMD to monitor INR

## 2018-06-21 NOTE — DISCHARGE NOTE ADULT - HOSPITAL COURSE
83 M with Hx of Atrial Fibrillation/Atrial Flutter on A/C with Coumadin presented to the hospital for elective ANANT/cardioversion and then admission for inpatient Tikosyn loading.  Patient denies any shortness of breath, chest pain, chest pressure, palpitations, dizziness or lightheadedness.  He underwent the ANANT today with cardioversion to NSR.  He is currently asymptomatic and feels well.  EKG has confirmed conversion to NSR with 1st degree AVB.  He will now require loading with Tikosyn and inpatient admission for monitor of QTc and bradycardia.     Patient tolerating tikosyn, QTc within accepted range. He is very anxious to go home.     Vital Signs Last 24 Hrs  T(C): 36.6 (21 Jun 2018 04:35), Max: 36.6 (20 Jun 2018 12:17)  T(F): 97.9 (21 Jun 2018 04:35), Max: 97.9 (20 Jun 2018 12:17)  HR: 59 (21 Jun 2018 04:35) (51 - 59)  BP: 117/61 (21 Jun 2018 04:35) (117/61 - 146/72)  BP(mean): --  RR: 17 (20 Jun 2018 19:46) (17 - 18)  SpO2: 95% (21 Jun 2018 04:35) (95% - 99%)        PHYSICAL EXAM:    Constitutional: NAD, awake and alert, well-developed  HEENT: PERR, EOMI, Normal Hearing, MMM  Neck: Soft and supple  Respiratory: Breath sounds are clear bilaterally, No wheezing, rales or rhonchi  Cardiovascular: S1 and S2, regular rate and rhythm, no Murmurs, gallops or rubs  Gastrointestinal: Bowel Sounds present, soft, nontender, nondistended, no guarding, no rebound  Extremities: No peripheral edema  Neurological: A/O x 3, no focal deficits in my limited exam  Skin: No rashes      Assessment and Plan:     PERSISTENT ATRIAL FIBRILLATION/FLUTTER, S/P ELECTIVE ANANT/CARDIOVERSION TO NSR, NOW REQUIRED INPATIENT ADMISSION FOR TIKOSYN LOADING AND MONITORING    S/P SUBTHERAPEUTIC INR ON COUMADIN    HYPERLIPIDEMIA    PROSTATE CANCER, ON HORMONAL THERAPY    GLAUCOMA    CHRONIC LEFT LEG WOUND/ULCER, S/P BECCA BOOT, PRESENT ON ADMISSION      PLAN:  -  daily PT/INR. INR therapeutic today. Continue coumadin   -  EP consult - Dr. Moretta appreciated - c/w tikosyn. follow up with him  -  Continue Tikosyn as per EP, follow up with Dr Smart  -  continue left leg Becca boot, patient has follow up appt today.    Home today.  His PMD was notified.  Spent more than 30 minutes to prepare the discharge.

## 2022-02-14 ENCOUNTER — INPATIENT (INPATIENT)
Facility: HOSPITAL | Age: 87
LOS: 3 days | Discharge: HOME HEALTH | DRG: 391 | End: 2022-02-18
Attending: FAMILY MEDICINE | Admitting: FAMILY MEDICINE
Payer: MEDICARE

## 2022-02-14 VITALS
HEART RATE: 78 BPM | HEIGHT: 70 IN | TEMPERATURE: 98 F | DIASTOLIC BLOOD PRESSURE: 77 MMHG | WEIGHT: 179.9 LBS | SYSTOLIC BLOOD PRESSURE: 142 MMHG | RESPIRATION RATE: 18 BRPM | OXYGEN SATURATION: 92 %

## 2022-02-14 DIAGNOSIS — Z85.46 PERSONAL HISTORY OF MALIGNANT NEOPLASM OF PROSTATE: Chronic | ICD-10-CM

## 2022-02-14 DIAGNOSIS — N39.0 URINARY TRACT INFECTION, SITE NOT SPECIFIED: ICD-10-CM

## 2022-02-14 DIAGNOSIS — Z90.49 ACQUIRED ABSENCE OF OTHER SPECIFIED PARTS OF DIGESTIVE TRACT: Chronic | ICD-10-CM

## 2022-02-14 LAB
ALBUMIN SERPL ELPH-MCNC: 3 G/DL — LOW (ref 3.3–5)
ALP SERPL-CCNC: 85 U/L — SIGNIFICANT CHANGE UP (ref 40–120)
ALT FLD-CCNC: 14 U/L — SIGNIFICANT CHANGE UP (ref 12–78)
ANION GAP SERPL CALC-SCNC: 5 MMOL/L — SIGNIFICANT CHANGE UP (ref 5–17)
APPEARANCE UR: CLEAR — SIGNIFICANT CHANGE UP
APTT BLD: 33.7 SEC — SIGNIFICANT CHANGE UP (ref 27.5–35.5)
AST SERPL-CCNC: 27 U/L — SIGNIFICANT CHANGE UP (ref 15–37)
BASOPHILS # BLD AUTO: 0 K/UL — SIGNIFICANT CHANGE UP (ref 0–0.2)
BASOPHILS NFR BLD AUTO: 0 % — SIGNIFICANT CHANGE UP (ref 0–2)
BILIRUB SERPL-MCNC: 0.9 MG/DL — SIGNIFICANT CHANGE UP (ref 0.2–1.2)
BILIRUB UR-MCNC: ABNORMAL
BUN SERPL-MCNC: 22 MG/DL — SIGNIFICANT CHANGE UP (ref 7–23)
CALCIUM SERPL-MCNC: 9.1 MG/DL — SIGNIFICANT CHANGE UP (ref 8.5–10.1)
CHLORIDE SERPL-SCNC: 97 MMOL/L — SIGNIFICANT CHANGE UP (ref 96–108)
CO2 SERPL-SCNC: 31 MMOL/L — SIGNIFICANT CHANGE UP (ref 22–31)
COLOR SPEC: ABNORMAL
CREAT SERPL-MCNC: 0.98 MG/DL — SIGNIFICANT CHANGE UP (ref 0.5–1.3)
DIFF PNL FLD: ABNORMAL
EOSINOPHIL # BLD AUTO: 0.15 K/UL — SIGNIFICANT CHANGE UP (ref 0–0.5)
EOSINOPHIL NFR BLD AUTO: 1 % — SIGNIFICANT CHANGE UP (ref 0–6)
GLUCOSE SERPL-MCNC: 97 MG/DL — SIGNIFICANT CHANGE UP (ref 70–99)
GLUCOSE UR QL: NEGATIVE — SIGNIFICANT CHANGE UP
HCT VFR BLD CALC: 38.4 % — LOW (ref 39–50)
HGB BLD-MCNC: 12.2 G/DL — LOW (ref 13–17)
INR BLD: 1.67 RATIO — HIGH (ref 0.88–1.16)
KETONES UR-MCNC: NEGATIVE — SIGNIFICANT CHANGE UP
LACTATE SERPL-SCNC: 1.6 MMOL/L — SIGNIFICANT CHANGE UP (ref 0.7–2)
LEUKOCYTE ESTERASE UR-ACNC: ABNORMAL
LYMPHOCYTES # BLD AUTO: 1.77 K/UL — SIGNIFICANT CHANGE UP (ref 1–3.3)
LYMPHOCYTES # BLD AUTO: 12 % — LOW (ref 13–44)
MCHC RBC-ENTMCNC: 29.3 PG — SIGNIFICANT CHANGE UP (ref 27–34)
MCHC RBC-ENTMCNC: 31.8 GM/DL — LOW (ref 32–36)
MCV RBC AUTO: 92.3 FL — SIGNIFICANT CHANGE UP (ref 80–100)
MONOCYTES # BLD AUTO: 2.07 K/UL — HIGH (ref 0–0.9)
MONOCYTES NFR BLD AUTO: 14 % — SIGNIFICANT CHANGE UP (ref 2–14)
NEUTROPHILS # BLD AUTO: 10.78 K/UL — HIGH (ref 1.8–7.4)
NEUTROPHILS NFR BLD AUTO: 73 % — SIGNIFICANT CHANGE UP (ref 43–77)
NITRITE UR-MCNC: NEGATIVE — SIGNIFICANT CHANGE UP
NRBC # BLD: SIGNIFICANT CHANGE UP /100 WBCS (ref 0–0)
PH UR: 5 — SIGNIFICANT CHANGE UP (ref 5–8)
PLATELET # BLD AUTO: 219 K/UL — SIGNIFICANT CHANGE UP (ref 150–400)
POTASSIUM SERPL-MCNC: 4.4 MMOL/L — SIGNIFICANT CHANGE UP (ref 3.5–5.3)
POTASSIUM SERPL-SCNC: 4.4 MMOL/L — SIGNIFICANT CHANGE UP (ref 3.5–5.3)
PROT SERPL-MCNC: 7.8 GM/DL — SIGNIFICANT CHANGE UP (ref 6–8.3)
PROT UR-MCNC: 30 MG/DL
PROTHROM AB SERPL-ACNC: 19.1 SEC — HIGH (ref 10.6–13.6)
RAPID RVP RESULT: SIGNIFICANT CHANGE UP
RBC # BLD: 4.16 M/UL — LOW (ref 4.2–5.8)
RBC # FLD: 14.7 % — HIGH (ref 10.3–14.5)
SARS-COV-2 RNA SPEC QL NAA+PROBE: SIGNIFICANT CHANGE UP
SODIUM SERPL-SCNC: 133 MMOL/L — LOW (ref 135–145)
SP GR SPEC: 1.02 — SIGNIFICANT CHANGE UP (ref 1.01–1.02)
UROBILINOGEN FLD QL: 1
WBC # BLD: 14.77 K/UL — HIGH (ref 3.8–10.5)
WBC # FLD AUTO: 14.77 K/UL — HIGH (ref 3.8–10.5)

## 2022-02-14 PROCEDURE — 74177 CT ABD & PELVIS W/CONTRAST: CPT | Mod: 26,MA

## 2022-02-14 PROCEDURE — 99223 1ST HOSP IP/OBS HIGH 75: CPT

## 2022-02-14 PROCEDURE — G0103: CPT

## 2022-02-14 PROCEDURE — 85027 COMPLETE CBC AUTOMATED: CPT

## 2022-02-14 PROCEDURE — 71045 X-RAY EXAM CHEST 1 VIEW: CPT | Mod: 26

## 2022-02-14 PROCEDURE — 83540 ASSAY OF IRON: CPT

## 2022-02-14 PROCEDURE — 71260 CT THORAX DX C+: CPT | Mod: 26,MA

## 2022-02-14 PROCEDURE — 82272 OCCULT BLD FECES 1-3 TESTS: CPT

## 2022-02-14 PROCEDURE — 70450 CT HEAD/BRAIN W/O DYE: CPT

## 2022-02-14 PROCEDURE — 80048 BASIC METABOLIC PNL TOTAL CA: CPT

## 2022-02-14 PROCEDURE — 99285 EMERGENCY DEPT VISIT HI MDM: CPT

## 2022-02-14 PROCEDURE — 82728 ASSAY OF FERRITIN: CPT

## 2022-02-14 PROCEDURE — 97530 THERAPEUTIC ACTIVITIES: CPT | Mod: GP

## 2022-02-14 PROCEDURE — 97116 GAIT TRAINING THERAPY: CPT | Mod: GP

## 2022-02-14 PROCEDURE — 36415 COLL VENOUS BLD VENIPUNCTURE: CPT

## 2022-02-14 PROCEDURE — 97163 PT EVAL HIGH COMPLEX 45 MIN: CPT | Mod: GP

## 2022-02-14 PROCEDURE — 78306 BONE IMAGING WHOLE BODY: CPT

## 2022-02-14 PROCEDURE — 93010 ELECTROCARDIOGRAM REPORT: CPT

## 2022-02-14 PROCEDURE — A9561: CPT

## 2022-02-14 PROCEDURE — 85025 COMPLETE CBC W/AUTO DIFF WBC: CPT

## 2022-02-14 PROCEDURE — 83550 IRON BINDING TEST: CPT

## 2022-02-14 PROCEDURE — 80053 COMPREHEN METABOLIC PANEL: CPT

## 2022-02-14 RX ORDER — LANOLIN ALCOHOL/MO/W.PET/CERES
3 CREAM (GRAM) TOPICAL AT BEDTIME
Refills: 0 | Status: DISCONTINUED | OUTPATIENT
Start: 2022-02-14 | End: 2022-02-18

## 2022-02-14 RX ORDER — APIXABAN 2.5 MG/1
5 TABLET, FILM COATED ORAL EVERY 12 HOURS
Refills: 0 | Status: DISCONTINUED | OUTPATIENT
Start: 2022-02-14 | End: 2022-02-16

## 2022-02-14 RX ORDER — ACETAMINOPHEN 500 MG
650 TABLET ORAL ONCE
Refills: 0 | Status: COMPLETED | OUTPATIENT
Start: 2022-02-14 | End: 2022-02-14

## 2022-02-14 RX ORDER — ALBUTEROL 90 UG/1
1 AEROSOL, METERED ORAL EVERY 6 HOURS
Refills: 0 | Status: DISCONTINUED | OUTPATIENT
Start: 2022-02-14 | End: 2022-02-18

## 2022-02-14 RX ORDER — ACETAMINOPHEN 500 MG
650 TABLET ORAL ONCE
Refills: 0 | Status: DISCONTINUED | OUTPATIENT
Start: 2022-02-14 | End: 2022-02-14

## 2022-02-14 RX ORDER — CEFEPIME 1 G/1
2000 INJECTION, POWDER, FOR SOLUTION INTRAMUSCULAR; INTRAVENOUS ONCE
Refills: 0 | Status: COMPLETED | OUTPATIENT
Start: 2022-02-14 | End: 2022-02-14

## 2022-02-14 RX ORDER — FINASTERIDE 5 MG/1
1 TABLET, FILM COATED ORAL
Qty: 0 | Refills: 0 | DISCHARGE

## 2022-02-14 RX ORDER — DOFETILIDE 0.25 MG/1
250 CAPSULE ORAL
Refills: 0 | Status: DISCONTINUED | OUTPATIENT
Start: 2022-02-14 | End: 2022-02-18

## 2022-02-14 RX ORDER — SODIUM CHLORIDE 9 MG/ML
1000 INJECTION INTRAMUSCULAR; INTRAVENOUS; SUBCUTANEOUS ONCE
Refills: 0 | Status: COMPLETED | OUTPATIENT
Start: 2022-02-14 | End: 2022-02-14

## 2022-02-14 RX ORDER — WARFARIN SODIUM 2.5 MG/1
1 TABLET ORAL
Qty: 0 | Refills: 0 | DISCHARGE

## 2022-02-14 RX ORDER — CEFTRIAXONE 500 MG/1
1000 INJECTION, POWDER, FOR SOLUTION INTRAMUSCULAR; INTRAVENOUS EVERY 24 HOURS
Refills: 0 | Status: DISCONTINUED | OUTPATIENT
Start: 2022-02-14 | End: 2022-02-15

## 2022-02-14 RX ORDER — METRONIDAZOLE 500 MG
500 TABLET ORAL ONCE
Refills: 0 | Status: COMPLETED | OUTPATIENT
Start: 2022-02-14 | End: 2022-02-14

## 2022-02-14 RX ORDER — ACETAMINOPHEN 500 MG
650 TABLET ORAL EVERY 6 HOURS
Refills: 0 | Status: DISCONTINUED | OUTPATIENT
Start: 2022-02-14 | End: 2022-02-18

## 2022-02-14 RX ORDER — ONDANSETRON 8 MG/1
4 TABLET, FILM COATED ORAL EVERY 8 HOURS
Refills: 0 | Status: DISCONTINUED | OUTPATIENT
Start: 2022-02-14 | End: 2022-02-18

## 2022-02-14 RX ORDER — ATORVASTATIN CALCIUM 80 MG/1
10 TABLET, FILM COATED ORAL AT BEDTIME
Refills: 0 | Status: DISCONTINUED | OUTPATIENT
Start: 2022-02-14 | End: 2022-02-18

## 2022-02-14 RX ORDER — TIMOLOL 0.5 %
1 DROPS OPHTHALMIC (EYE) DAILY
Refills: 0 | Status: DISCONTINUED | OUTPATIENT
Start: 2022-02-14 | End: 2022-02-18

## 2022-02-14 RX ADMIN — Medication 650 MILLIGRAM(S): at 15:29

## 2022-02-14 RX ADMIN — CEFEPIME 100 MILLIGRAM(S): 1 INJECTION, POWDER, FOR SOLUTION INTRAMUSCULAR; INTRAVENOUS at 15:29

## 2022-02-14 RX ADMIN — SODIUM CHLORIDE 1000 MILLILITER(S): 9 INJECTION INTRAMUSCULAR; INTRAVENOUS; SUBCUTANEOUS at 15:29

## 2022-02-14 RX ADMIN — Medication 100 MILLIGRAM(S): at 19:45

## 2022-02-14 NOTE — ED PROVIDER NOTE - OBJECTIVE STATEMENT
86 y/o male with a PMHx of Afib, HTN, HLD, presents to the ED BIBA c/o lower back pain. No trauma. Pt is a poor historian.

## 2022-02-14 NOTE — ED ADULT TRIAGE NOTE - CHIEF COMPLAINT QUOTE
pt presents to ed via ems from home for worsening atraumatic back pain, per ems, pts mental status has been waxing and waning recently and was unable to receive outpatient imaging of back. pt alert and oriented to self and place. pt has bilateral leg swelling and receives wound care by home rn 1x a week.

## 2022-02-14 NOTE — H&P ADULT - NSHPPHYSICALEXAM_GEN_ALL_CORE
Vitals  T(F): 98.7 (02-14-22 @ 22:17), Max: 100.5 (02-14-22 @ 14:55)  HR: 78 (02-14-22 @ 22:17) (78 - 79)  BP: 122/64 (02-14-22 @ 22:17) (122/64 - 142/77)  RR: 16 (02-14-22 @ 22:17) (16 - 18)  SpO2: 96% (02-14-22 @ 22:17) (92% - 96%)    PHYSICAL EXAM   Gen: NAD, comfortable, AA&Ox 1(person only)  HEENT: head atraumatic and normocephalic, PEERLA, EOMI,  no gross abnormalities of ears, mucous membranes moist, no oral lesions, neck supple without masses/goiter/lymphadenopathy, no JVD  CVS: +s1, s2, regular rate and rhythm, no murmurs, rubs or gallops, no thrill, normal PMI  Pulmonary: normal respiratory effort, clear to auscultation b/l, no wheezes/crackles/rhonchi  Abdomen: soft, non-tender, non-distended, +bowel sounds in all 4 quadrants, no masses noted, no guarding or rigidity   Back: no point tenderness, no CVA tenderness (limited exam as pt was uncooperative)   Extremities: 1+ pedal edema b/l, chronic venous stasis noted    Skin: nl warm and dry, no wounds   Neuro: grossly non-focal

## 2022-02-14 NOTE — ED PROVIDER NOTE - MUSCULOSKELETAL, MLM
Spine appears normal, range of motion is not limited, no muscle or joint tenderness. No spinal tenderness

## 2022-02-14 NOTE — ED PROVIDER NOTE - CARDIAC, MLM
Normal rate, regular rhythm.  Heart sounds S1, S2.  No murmurs, rubs or gallops. Normal rate, regular rhythm.  Heart sounds S1, S2.  No murmurs, rubs or gallops. 1 + edema bilaterally

## 2022-02-14 NOTE — ED PROVIDER NOTE - PROGRESS NOTE DETAILS
88 y/o M presents with worsening mental status and low back pain. Pt is a poor historian, offers no complaints at this time.   Gen: A+Ox1, lung:ctaB/L, heart:[id number], abd:soft nd, nttp. Back: no erythema/fluctuance. No midline tenderness. skin: superficial pressure ulcer L buttock. 1+ pitting edema B/L LE. B/L venous stasis. -Cong Bowden PA-C Jae Pickens MD : found to have rectal temp, will obtain labs, CTAP, UA, likely UTI. SPoke with patients wife rufino ([phone]). Reports patient has been complaining of back pain for the past few months, he had MR with Dr. osei that was unremarkable. Over the last 2 days he has been having difficulty getting out of bed and ambulating. Mental status is about his baseline. No other complaints at this time. -Cong Bowden PA-C Case discussed with Dr. hanks who will admit. -Cong Bowden PA-C

## 2022-02-14 NOTE — PHARMACOTHERAPY INTERVENTION NOTE - COMMENTS
Medication reconciliation completed.  Patient was unable to provide medication information, spoke to wife Rafia ([phone]) and they provided current medication list; confirmed with Dr. [facility].  Pt did not take any medication at home today.

## 2022-02-14 NOTE — H&P ADULT - NSHPOUTPATIENTPROVIDERS_GEN_ALL_CORE
PCP: Dr. Rush   Cardiologist: Dr. Sanya Hayes Spine: Dr. Jay Sam   Vascular: Dr. Craig   Urologist: Dr. Flowers (moved to [place]) PCP: Dr. Christy   Cardiologist: Dr. Sanya Hayes Spine: Dr. Jay Sam   Vascular: Dr. Craig   Urologist: Dr. Flowers (moved to [place])

## 2022-02-14 NOTE — ED PROVIDER NOTE - CARE PLAN
Principal Discharge DX:	UTI (urinary tract infection)  Secondary Diagnosis:	Diverticulitis  Secondary Diagnosis:	Back pain   1

## 2022-02-14 NOTE — H&P ADULT - ASSESSMENT
88 y/o M with PMHx of Afib/Aflutter s/p cardioversion, HLD, Glaucoma, Hx of prostate cancer presented to ED via EMS for back pain    ADMIT: MED/SURG    #Acute Toxic-Metobolic Encephalopathy   -EKG: NSR with 1st degree AV block   -CT chest/abdomen/plevis with IV contrast: small 2 cm right renal lesion, acute small bowel diverticulitis, scattered sclerotic lesions   -(+)SIRS criteria: (+)Leukocytosis, (+)Fever   -UA: (+)trace LE, (+)hematuria   -f/u on UCx and BCx   -Start Rocephin       #Abnormal CT findings: Acute small bowel Diverticulitis, 2 cm right renal lesion and scattered sclerotic lesion   -Acute small bowel diverticulitis: currently asymptomatic, will monitor for now. Consider GI eval if clinically indicated   -scattered sclerotic bone lesion: Hx of prostate cancer, s/p outpt MRI by Dr. Sam. Was advised to have outpt bone scan   -2 cm right renal lesion: outpt f/u     #Anemia   -serial H&H   -occult blood   -will get iron studies     #Hyponatremia   -mild   -trend BMP/CMP     #Hx of Afib/AFlutter s/p cardioversion   -currently in NSR   -c/w Eliquis 5mg BID   -c/w Dofelitide 250mg BID       #HLD   -c/w Atorvastatin 10mg     #Glaucoma   -c/w Timolol     #DVT prophylaxis   -on Eliquis     #DIET   -Clear Liquid     #Advanced Directive   -FULL CODE    86 y/o M with PMHx of Afib/Aflutter s/p cardioversion, HLD, Glaucoma, Hx of prostate cancer presented to ED via EMS for back pain    ADMIT: MED/SURG    #Acute Toxic-Metobolic Encephalopathy   -EKG: NSR with 1st degree AV block   -CT chest/abdomen/plevis with IV contrast: small 2 cm right renal lesion, acute small bowel diverticulitis, scattered sclerotic lesions   -(+)SIRS criteria: (+)Leukocytosis, (+)Fever   -UA: (+)trace LE, (+)hematuria   -f/u on UCx and BCx   -Start Meropenem   -ID consult       #Abnormal CT findings: Acute small bowel Diverticulitis, 2 cm right renal lesion and scattered sclerotic lesion   -Acute small bowel diverticulitis: currently asymptomatic, will monitor for now. Consider GI eval if clinically indicated   -scattered sclerotic bone lesion: Hx of prostate cancer, s/p outpt MRI by Dr. Sam. Was advised to have outpt bone scan   -2 cm right renal lesion: outpt f/u     #Anemia   -serial H&H   -occult blood   -will get iron studies     #Hyponatremia   -mild   -trend BMP/CMP     #Hx of Afib/AFlutter s/p cardioversion   -currently in NSR   -c/w Eliquis 5mg BID   -c/w Dofetilide 250mg BID       #HLD   -c/w Atorvastatin 10mg     #Glaucoma   -c/w Timolol     #DVT prophylaxis   -on Eliquis     #DIET   -Clear Liquid     #Advanced Directive   -FULL CODE    88 y/o M with PMHx of Afib/Aflutter s/p cardioversion, HLD, Glaucoma, Hx of prostate cancer presented to ED via EMS for back pain    ADMIT: MED/SURG    #Acute Toxic-Metobolic Encephalopathy   -EKG: NSR with 1st degree AV block   -CT chest/abdomen/plevis with IV contrast: small 2 cm right renal lesion, acute small bowel diverticulitis, scattered sclerotic lesions   -(+)SIRS criteria: (+)Leukocytosis, (+)Fever   -UA: (+)trace LE, (+)hematuria   -f/u on UCx and BCx   -Start Ceftriaxone 1gm   -consider ID consult      #Abnormal CT findings: Acute small bowel Diverticulitis, 2 cm right renal lesion and scattered sclerotic lesion   -Acute small bowel diverticulitis: currently asymptomatic, will monitor for now. Consider GI eval if clinically indicated   -scattered sclerotic bone lesion: Hx of prostate cancer, s/p outpt MRI by Dr. Sam. Was advised to have outpt bone scan. Will consult Dr. Sam   -2 cm right renal lesion: outpt f/u       #Anemia   -serial H&H   -occult blood   -will get iron studies     #Hyponatremia   -mild   -trend BMP/CMP     #Hx of Afib/AFlutter s/p cardioversion   -currently in NSR   -c/w Eliquis 5mg BID   -c/w Dofetilide 250mg BID       #HLD   -c/w Atorvastatin 10mg     #Glaucoma   -c/w Timolol     #DVT prophylaxis   -on Eliquis     #DIET   -Clear Liquid     #Advanced Directive   -FULL CODE    88 y/o M with PMHx of Afib/Aflutter s/p cardioversion, HLD, Glaucoma, Hx of prostate cancer presented to ED via EMS for back pain    ADMIT: MED/SURG    #Acute Toxic-Metobolic Encephalopathy   -EKG: NSR with 1st degree AV block   -CT chest/abdomen/plevis with IV contrast: small 2 cm right renal lesion, acute small bowel diverticulitis, scattered sclerotic lesions   -f/u on CT head w/o contrast   -(+)SIRS criteria: (+)Leukocytosis, (+)Fever   -UA: (+)trace LE, (+)hematuria   -f/u on UCx and BCx   -Start Ceftriaxone 1gm   -consider ID consult    #Abnormal CT findings: Acute small bowel Diverticulitis, 2 cm right renal lesion and scattered sclerotic lesion   -Acute small bowel diverticulitis: currently asymptomatic, will monitor for now. Consider GI eval if clinically indicated   -scattered sclerotic bone lesion: Hx of prostate cancer, s/p outpt MRI by Dr. Sam. Was advised to have outpt bone scan. Will consult Dr. Sam. Pain management for now and fall risk   -2 cm right renal lesion: outpt f/u     #LE Venous Stasis   -no evidence of cellulitis, if concern for cellulitis, Rocephin will cover it   -wound care consult     #Anemia   -serial H&H   -occult blood   -will get iron studies     #Hyponatremia   -mild   -trend BMP/CMP     #Hx of Afib/AFlutter s/p cardioversion   -currently in NSR   -c/w Eliquis 5mg BID   -c/w Dofetilide 250mg BID     #HLD   -c/w Atorvastatin 10mg     #Glaucoma   -c/w Timolol     #DVT prophylaxis   -on Eliquis     #DIET   -Clear Liquid     #Advanced Directive   -FULL CODE     Case and plan discussed with Dr. Alejandro    88 y/o M with PMHx of Afib/Aflutter s/p cardioversion, HLD, Glaucoma, Hx of prostate cancer presented to ED via EMS for back pain    ADMIT: MED/SURG    #Acute Toxic-Metobolic Encephalopathy   -EKG: NSR with 1st degree AV block   -CT chest/abdomen/plevis with IV contrast: small 2 cm right renal lesion, acute small bowel diverticulitis, scattered sclerotic lesions   -f/u on CT head w/o contrast   -(+)SIRS criteria: (+)Leukocytosis, (+)Fever   -UA: (+)trace LE, (+)hematuria   -f/u on UCx and BCx   -Start Ceftriaxone 1gm   -consider ID consult    #Abnormal CT findings: Acute small bowel Diverticulitis, 2 cm right renal lesion and scattered sclerotic lesion   -Acute small bowel diverticulitis: currently asymptomatic, will monitor for now. Consider GI eval if clinically indicated   -scattered sclerotic bone lesion: Hx of prostate cancer, s/p outpt MRI by Dr. Sam. Was advised to have outpt bone scan. Will consult Dr. Sam. Pain management for now and fall risk   -2 cm right renal lesion: outpt f/u     #LE Venous Stasis   -no evidence of cellulitis, if concern for cellulitis Rocephin will cover it   -wound care consult     #Anemia   -serial H&H   -occult blood   -will get iron studies     #Hyponatremia   -mild   -trend BMP/CMP     #Hx of Afib/Aflutter s/p cardioversion   -currently in NSR   -c/w Eliquis 5mg BID   -c/w Dofetilide 250mg BID     #HLD   -c/w Atorvastatin 10mg     #Glaucoma   -c/w Timolol     #DVT prophylaxis   -on Eliquis     #DIET   -Clear Liquid     #Advanced Directive   -FULL CODE     Case and plan discussed with Dr. Alejandro    88 y/o M with PMHx of Afib/Aflutter s/p cardioversion, HLD, Glaucoma, Hx of prostate cancer presented to ED via EMS for back pain    ADMIT: MED/SURG    #Acute Toxic-Metobolic Encephalopathy   -EKG: NSR with 1st degree AV block   -CT chest/abdomen/plevis with IV contrast: small 2 cm right renal lesion, acute small bowel diverticulitis, scattered sclerotic lesions   -f/u on CT head w/o contrast   -(+)SIRS criteria: (+)Leukocytosis, (+)Fever   -UA: (+)trace LE, (+)hematuria   -f/u on UCx and BCx   -Start Ceftriaxone 1gm   -consider ID consult    #Abnormal CT findings: Acute small bowel Diverticulitis, 2 cm right renal lesion and scattered sclerotic lesion   -Acute small bowel diverticulitis: currently asymptomatic, will monitor for now. Consider GI eval if clinically indicated   -scattered sclerotic bone lesion: Hx of prostate cancer, s/p outpt MRI by Dr. Sam. Was advised to have outpt bone scan. Will consult Dr. Sam. Pain management for now and fall risk precaution   -2 cm right renal lesion: outpt f/u     #LE Venous Stasis Dermatitis   -no evidence of cellulitis, if concern for cellulitis Rocephin will cover it   -wound care consult     #Anemia   -serial H&H   -occult blood   -will get iron studies     #Hyponatremia   -mild   -trend BMP/CMP     #Hx of Afib/Aflutter s/p cardioversion   -currently in NSR   -c/w Eliquis 5mg BID   -c/w Dofetilide 250mg BID     #HLD   -c/w Atorvastatin 10mg     #Glaucoma   -c/w Timolol     #DVT prophylaxis   -on Eliquis     #DIET   -Clear Liquid     #Advanced Directive   -FULL CODE     Case and plan discussed with Dr. Alejandro

## 2022-02-14 NOTE — ED PROVIDER NOTE - CLINICAL SUMMARY MEDICAL DECISION MAKING FREE TEXT BOX
Pt is a poor historian presents from home for reported back pain. Pt appears comfortable with no spinal tenderness. Attempted to reached family, number disconnected. Will obtain CT T and L spine to r/o fracture, Tylenol and reassess.

## 2022-02-14 NOTE — H&P ADULT - HISTORY OF PRESENT ILLNESS
88 y/o M with PMHx of Afib/Aflutter s/p cardioversion, HLD, Glaucoma, Hx of prostate cancer presented to ED via EMS for back pain. Patient is poor historian and cannot further history. Hx obtained from wife. According to wife, patient slipped and fell in the bathroom about 2-3 months ago and since have been complaining of back pain which has been progressively getting worse. Patient was able to ambulate with cane and then walker since the fall, but for past 5-7 days have been unable to ambulate due to pain. Patient was evaluated by Dr. Sam (ortho spine) and had an o/p MRI which revealed no acute fx (per wife) but was advised to get bone scan for further eval. Patient was also recently prescribed Tramadol for back pain and since then his mentation has been waxing and waining. Wife reports that patient overall has been more forgetful. At baseline, pt is oriented to self, time and place. Patient has Hx of recurrent LE "cellulitis" and has been evaluated by visiting nurse and has been getting home PT.   Patient has no other complaints except for back pain  88 y/o M with PMHx of Afib/Aflutter s/p cardioversion, HLD, Glaucoma, Hx of prostate cancer presented to ED via EMS for back pain. Patient is poor historian, currenlty complains of back pain and cannot provide further history. Hx obtained from wife over the phone. According to wife, patient slipped and fell in the bathroom about 2-3 months ago and since have been complaining of back pain which has been progressively getting worse. Patient was able to ambulate with cane and then walker since the fall, but for past 5-7 days have been unable to ambulate due to pain. Patient was evaluated by Dr. Sam (ortho spine) and had an o/p MRI (approx 2 weeks) which revealed no acute fx (per wife) but was advised to get bone scan for further eval. Patient was also recently prescribed Tramadol for back pain and since then his mentation has been waxing and waning. Wife reports that patient overall has been more forgetful. At baseline, pt is oriented to self, time and place. Patient has Hx of recurrent LE "cellulitis" and has been evaluated by visiting nurse and has been getting home PT.   In ED, pt was noted to be febrile, tmax: 100.5, HR: 78, BP: 142/77. CT chest/ abdomen-pelvis with IV contrast showed 2cm right renal lesion, acute small bowel diverticulitis and scattered sclerotic bone lesions. Patient received 1 dose of Cefepime and Metronidazole.

## 2022-02-15 LAB
CULTURE RESULTS: SIGNIFICANT CHANGE UP
OB PNL STL: NEGATIVE — SIGNIFICANT CHANGE UP
SPECIMEN SOURCE: SIGNIFICANT CHANGE UP

## 2022-02-15 PROCEDURE — 70450 CT HEAD/BRAIN W/O DYE: CPT | Mod: 26

## 2022-02-15 PROCEDURE — 99233 SBSQ HOSP IP/OBS HIGH 50: CPT | Mod: GC

## 2022-02-15 RX ORDER — HALOPERIDOL DECANOATE 100 MG/ML
1 INJECTION INTRAMUSCULAR ONCE
Refills: 0 | Status: COMPLETED | OUTPATIENT
Start: 2022-02-15 | End: 2022-02-15

## 2022-02-15 RX ORDER — CIPROFLOXACIN LACTATE 400MG/40ML
500 VIAL (ML) INTRAVENOUS EVERY 12 HOURS
Refills: 0 | Status: DISCONTINUED | OUTPATIENT
Start: 2022-02-15 | End: 2022-02-18

## 2022-02-15 RX ORDER — ALPRAZOLAM 0.25 MG
0.5 TABLET ORAL ONCE
Refills: 0 | Status: DISCONTINUED | OUTPATIENT
Start: 2022-02-16 | End: 2022-02-16

## 2022-02-15 RX ORDER — CEFTRIAXONE 500 MG/1
1000 INJECTION, POWDER, FOR SOLUTION INTRAMUSCULAR; INTRAVENOUS EVERY 24 HOURS
Refills: 0 | Status: DISCONTINUED | OUTPATIENT
Start: 2022-02-15 | End: 2022-02-15

## 2022-02-15 RX ORDER — METRONIDAZOLE 500 MG
500 TABLET ORAL EVERY 8 HOURS
Refills: 0 | Status: DISCONTINUED | OUTPATIENT
Start: 2022-02-15 | End: 2022-02-18

## 2022-02-15 RX ORDER — QUETIAPINE FUMARATE 200 MG/1
12.5 TABLET, FILM COATED ORAL AT BEDTIME
Refills: 0 | Status: DISCONTINUED | OUTPATIENT
Start: 2022-02-15 | End: 2022-02-18

## 2022-02-15 RX ADMIN — HALOPERIDOL DECANOATE 1 MILLIGRAM(S): 100 INJECTION INTRAMUSCULAR at 02:13

## 2022-02-15 RX ADMIN — APIXABAN 5 MILLIGRAM(S): 2.5 TABLET, FILM COATED ORAL at 10:27

## 2022-02-15 RX ADMIN — DOFETILIDE 250 MICROGRAM(S): 0.25 CAPSULE ORAL at 21:39

## 2022-02-15 RX ADMIN — Medication 500 MILLIGRAM(S): at 21:38

## 2022-02-15 RX ADMIN — QUETIAPINE FUMARATE 12.5 MILLIGRAM(S): 200 TABLET, FILM COATED ORAL at 21:39

## 2022-02-15 RX ADMIN — APIXABAN 5 MILLIGRAM(S): 2.5 TABLET, FILM COATED ORAL at 21:38

## 2022-02-15 RX ADMIN — ATORVASTATIN CALCIUM 10 MILLIGRAM(S): 80 TABLET, FILM COATED ORAL at 21:38

## 2022-02-15 RX ADMIN — CEFTRIAXONE 100 MILLIGRAM(S): 500 INJECTION, POWDER, FOR SOLUTION INTRAMUSCULAR; INTRAVENOUS at 05:02

## 2022-02-15 RX ADMIN — Medication 3 MILLIGRAM(S): at 00:06

## 2022-02-15 RX ADMIN — Medication 500 MILLIGRAM(S): at 21:39

## 2022-02-15 RX ADMIN — DOFETILIDE 250 MICROGRAM(S): 0.25 CAPSULE ORAL at 10:27

## 2022-02-15 RX ADMIN — Medication 1 DROP(S): at 12:27

## 2022-02-15 NOTE — PHYSICAL THERAPY INITIAL EVALUATION ADULT - MODALITIES TREATMENT COMMENTS
Pt left OOB in chair in NAD with CBIR. Pt instructed to not get up alone. Chair alarm activated. VIPUL Jeffers made aware

## 2022-02-15 NOTE — PATIENT PROFILE ADULT - FALL HARM RISK - HARM RISK INTERVENTIONS

## 2022-02-15 NOTE — PHYSICAL THERAPY INITIAL EVALUATION ADULT - DISCHARGE DISPOSITION, PT EVAL
Sepsis Week 3 Survey      Responses   Facility patient discharged from?  [facility]   Does the patient have one of the following disease processes/diagnoses(primary or secondary)?  Sepsis   Week 3 attempt successful?  Yes   Call start time  [id number]   Call end time  0954   General alerts for this patient  Talk with daughter-patient is confused.   Discharge diagnosis  thoracentesis,  CHF,  PNA,  pleural effusions,  AFIB,  chronic anticoagulation,  T2DM,  lactic acidosis,  HTN,  CKD,  proteinuria,  septic shock   Is patient permission given to speak with other caregiver?  Yes   List who call center can speak with  Sari-daughter   Person spoke with today (if not patient) and relationship  Sari   Has the patient kept scheduled appointments due by today?  Yes   What is the Home health agency?   [facility]    Home health comments   discharged   Comments  Pt urinating and has frequent bouts of incontinence--I have advised daughter to have UA taken and have pt f/u with her MD   What is the patient's perception of their health status since discharge?  New symptoms unrelated to diagnosis   Is the patient/caregiver able to teach back Sepsis?  S - Shivering,fever or very cold, E - Extreme pain or generalized discomfort (worst ever,especially abdomen), P - Pale or discolored skin, S - Sleepy, difficult to arouse,confused, I -   I feel like I might die-a feeling of hopelessness, S - Short of breath   Is patient/caregiver able to teach back steps to recovery at home?  Set small, achievable goals for return to baseline health, Rest and regain strength, Talk about feelings with family/friends, Record milestones and struggles in a journal, Learn about sepsis(sepsis.org), Eat a balanced diet, Exercise as tolerated, Make a list of questions for PCP appoinment   Additional teach back comments  Advised daughter to have pt seen. Enc good fluid intake. Daughter caring for her mother at home and has alot of stressors with moving and a new  grandchild. BS running high at times.   Week 3 call completed?  Yes          Cyntiha Garcia RN   Home with [facility] for Balance/Gait Stability vs CACHORRO - Need SW Input

## 2022-02-15 NOTE — PHYSICAL THERAPY INITIAL EVALUATION ADULT - ACTIVE RANGE OF MOTION EXAMINATION, REHAB EVAL
Except BLE Hip Flex, Knee Flex/Ext, Ankle DF all limited due to weakness and joint hypomobility/bilateral upper extremity Active ROM was WFL (within functional limits)/bilateral  lower extremity Active ROM was WFL (within functional limits)

## 2022-02-15 NOTE — PHYSICAL THERAPY INITIAL EVALUATION ADULT - ORIENTATION, REHAB EVAL
A+Ox2 to person and place only. Pt unable to recall correct date/time and reason for admission. Patient states, " I am here for an operation on my rectum."

## 2022-02-15 NOTE — PHYSICAL THERAPY INITIAL EVALUATION ADULT - LIVES WITH, PROFILE
Pt lives at home with wife. Unable to obtain full accurate home environment situation such as stairs and HHA hours due to patient's confusion

## 2022-02-15 NOTE — CONSULT NOTE ADULT - ASSESSMENT
HPI:  86 y/o M with PMHx of Afib/Aflutter s/p cardioversion, HLD, Glaucoma, Hx of prostate cancer presented to ED via EMS for back pain. Patient is poor historian, currenlty complains of back pain and cannot provide further history. Hx obtained from wife over the phone. According to wife, patient slipped and fell in the bathroom about 2-3 months ago and since have been complaining of back pain which has been progressively getting worse. Patient was able to ambulate with cane and then walker since the fall, but for past 5-7 days have been unable to ambulate due to pain. Patient was evaluated by Dr. Sam (ortho spine) and had an o/p MRI (approx 2 weeks) which revealed no acute fx (per wife) but was advised to get bone scan for further eval. Patient was also recently prescribed Tramadol for back pain and since then his mentation has been waxing and waning. Wife reports that patient overall has been more forgetful. Patient has Hx of recurrent LE "cellulitis" and has been evaluated by visiting nurse and has been getting home PT.     IMP:   Multiple sclerotic bony metastases   Hx Prostate CA  DDD LS spine  Lumbar spinal stenosis  Dementia    PLAN:  Patient admitted to Medicine  Analgesia sparingly due to change in MS  Medical management  No impending spinal fracutres  Tried unsuccessfully to contact patient's wife to get info on Prostate CA diagnosis and treatment

## 2022-02-15 NOTE — PHYSICAL THERAPY INITIAL EVALUATION ADULT - IMPAIRED TRANSFERS: SIT/STAND, REHAB EVAL
impaired balance/cognition/impaired coordination/decreased flexibility/impaired postural control/decreased ROM/decreased strength

## 2022-02-15 NOTE — PHYSICAL THERAPY INITIAL EVALUATION ADULT - IMPAIRMENTS CONTRIBUTING TO GAIT DEVIATIONS, PT EVAL
impaired balance/cognition/impaired coordination/decreased flexibility/impaired motor control/impaired postural control/decreased ROM/decreased strength

## 2022-02-16 LAB
ANION GAP SERPL CALC-SCNC: 5 MMOL/L — SIGNIFICANT CHANGE UP (ref 5–17)
BUN SERPL-MCNC: 17 MG/DL — SIGNIFICANT CHANGE UP (ref 7–23)
CALCIUM SERPL-MCNC: 8.7 MG/DL — SIGNIFICANT CHANGE UP (ref 8.5–10.1)
CHLORIDE SERPL-SCNC: 102 MMOL/L — SIGNIFICANT CHANGE UP (ref 96–108)
CO2 SERPL-SCNC: 31 MMOL/L — SIGNIFICANT CHANGE UP (ref 22–31)
CREAT SERPL-MCNC: 0.74 MG/DL — SIGNIFICANT CHANGE UP (ref 0.5–1.3)
FERRITIN SERPL-MCNC: 379 NG/ML — SIGNIFICANT CHANGE UP (ref 30–400)
GLUCOSE SERPL-MCNC: 99 MG/DL — SIGNIFICANT CHANGE UP (ref 70–99)
HCT VFR BLD CALC: 36 % — LOW (ref 39–50)
HGB BLD-MCNC: 11.5 G/DL — LOW (ref 13–17)
MCHC RBC-ENTMCNC: 28.8 PG — SIGNIFICANT CHANGE UP (ref 27–34)
MCHC RBC-ENTMCNC: 31.9 GM/DL — LOW (ref 32–36)
MCV RBC AUTO: 90.2 FL — SIGNIFICANT CHANGE UP (ref 80–100)
PLATELET # BLD AUTO: 239 K/UL — SIGNIFICANT CHANGE UP (ref 150–400)
POTASSIUM SERPL-MCNC: 3.8 MMOL/L — SIGNIFICANT CHANGE UP (ref 3.5–5.3)
POTASSIUM SERPL-SCNC: 3.8 MMOL/L — SIGNIFICANT CHANGE UP (ref 3.5–5.3)
RBC # BLD: 3.99 M/UL — LOW (ref 4.2–5.8)
RBC # FLD: 14.6 % — HIGH (ref 10.3–14.5)
SODIUM SERPL-SCNC: 138 MMOL/L — SIGNIFICANT CHANGE UP (ref 135–145)
WBC # BLD: 9.92 K/UL — SIGNIFICANT CHANGE UP (ref 3.8–10.5)
WBC # FLD AUTO: 9.92 K/UL — SIGNIFICANT CHANGE UP (ref 3.8–10.5)

## 2022-02-16 PROCEDURE — 99233 SBSQ HOSP IP/OBS HIGH 50: CPT | Mod: GC

## 2022-02-16 PROCEDURE — 78306 BONE IMAGING WHOLE BODY: CPT | Mod: 26

## 2022-02-16 RX ORDER — APIXABAN 2.5 MG/1
5 TABLET, FILM COATED ORAL
Refills: 0 | Status: DISCONTINUED | OUTPATIENT
Start: 2022-02-16 | End: 2022-02-18

## 2022-02-16 RX ORDER — SODIUM CHLORIDE 9 MG/ML
500 INJECTION INTRAMUSCULAR; INTRAVENOUS; SUBCUTANEOUS ONCE
Refills: 0 | Status: COMPLETED | OUTPATIENT
Start: 2022-02-16 | End: 2022-02-17

## 2022-02-16 RX ORDER — FERROUS SULFATE 325(65) MG
325 TABLET ORAL DAILY
Refills: 0 | Status: DISCONTINUED | OUTPATIENT
Start: 2022-02-16 | End: 2022-02-18

## 2022-02-16 RX ORDER — LIDOCAINE 4 G/100G
1 CREAM TOPICAL EVERY 24 HOURS
Refills: 0 | Status: DISCONTINUED | OUTPATIENT
Start: 2022-02-16 | End: 2022-02-18

## 2022-02-16 RX ORDER — POLYETHYLENE GLYCOL 3350 17 G/17G
17 POWDER, FOR SOLUTION ORAL DAILY
Refills: 0 | Status: DISCONTINUED | OUTPATIENT
Start: 2022-02-16 | End: 2022-02-18

## 2022-02-16 RX ORDER — SENNA PLUS 8.6 MG/1
2 TABLET ORAL AT BEDTIME
Refills: 0 | Status: DISCONTINUED | OUTPATIENT
Start: 2022-02-16 | End: 2022-02-18

## 2022-02-16 RX ADMIN — QUETIAPINE FUMARATE 12.5 MILLIGRAM(S): 200 TABLET, FILM COATED ORAL at 21:33

## 2022-02-16 RX ADMIN — Medication 500 MILLIGRAM(S): at 05:50

## 2022-02-16 RX ADMIN — Medication 325 MILLIGRAM(S): at 09:50

## 2022-02-16 RX ADMIN — SENNA PLUS 2 TABLET(S): 8.6 TABLET ORAL at 21:33

## 2022-02-16 RX ADMIN — LIDOCAINE 1 PATCH: 4 CREAM TOPICAL at 19:02

## 2022-02-16 RX ADMIN — Medication 0.5 MILLIGRAM(S): at 09:40

## 2022-02-16 RX ADMIN — Medication 1 DROP(S): at 11:53

## 2022-02-16 RX ADMIN — Medication 500 MILLIGRAM(S): at 21:58

## 2022-02-16 RX ADMIN — POLYETHYLENE GLYCOL 3350 17 GRAM(S): 17 POWDER, FOR SOLUTION ORAL at 09:51

## 2022-02-16 RX ADMIN — LIDOCAINE 1 PATCH: 4 CREAM TOPICAL at 17:28

## 2022-02-16 RX ADMIN — Medication 500 MILLIGRAM(S): at 21:34

## 2022-02-16 RX ADMIN — ATORVASTATIN CALCIUM 10 MILLIGRAM(S): 80 TABLET, FILM COATED ORAL at 21:34

## 2022-02-16 RX ADMIN — Medication 3 MILLIGRAM(S): at 00:39

## 2022-02-16 RX ADMIN — APIXABAN 5 MILLIGRAM(S): 2.5 TABLET, FILM COATED ORAL at 09:50

## 2022-02-16 RX ADMIN — DOFETILIDE 250 MICROGRAM(S): 0.25 CAPSULE ORAL at 09:50

## 2022-02-16 RX ADMIN — Medication 500 MILLIGRAM(S): at 14:11

## 2022-02-16 RX ADMIN — DOFETILIDE 250 MICROGRAM(S): 0.25 CAPSULE ORAL at 21:33

## 2022-02-16 RX ADMIN — APIXABAN 5 MILLIGRAM(S): 2.5 TABLET, FILM COATED ORAL at 21:33

## 2022-02-16 RX ADMIN — Medication 500 MILLIGRAM(S): at 09:49

## 2022-02-16 NOTE — DIETITIAN INITIAL EVALUATION ADULT. - MALNUTRITION
meets criteria for severe PCM in the context of acute illness meets criteria for severe PCM in the context of acute illness r/t decreased ability to meet increased nutrient needs 2/2 chronic back pain AEB moderate muscle/fat wasting meets criteria for severe PCM in the context of acute illness r/t decreased ability to meet increased nutrient needs 2/2 UTI AEB moderate muscle/fat wasting

## 2022-02-16 NOTE — DIETITIAN INITIAL EVALUATION ADULT. - ETIOLOGY
r/t decreased ability to meet increased nutrient needs 2/2 chronic back pain r/t decreased ability to meet increased nutrient needs 2/2 UTI

## 2022-02-16 NOTE — DIETITIAN INITIAL EVALUATION ADULT. - PERTINENT LABORATORY DATA
02-16    138  |  102  |  17  ----------------------------<  99  3.8   |  31  |  0.74    Ca    8.7      16 Feb 2022 07:54    TPro  7.5  /  Alb  2.8<L>  /  TBili  0.8  /  DBili  x   /  AST  33  /  ALT  14  /  AlkPhos  84  02-15

## 2022-02-16 NOTE — DIETITIAN INITIAL EVALUATION ADULT. - ADD RECOMMEND
1) Continue w/ regular diet w/ Ensure Enlive TID to optimize caloric and protein needs, 2) Encourage protein-rich foods and maximize food preferences, 3) Monitor BM- provide bowel regimen prn, 4) Provide MVI to ensure 100% RDA are met 1) Continue w/ regular diet w/ Ensure Enlive TID in between meals to optimize caloric and protein needs, 2) Encourage protein-rich foods and maximize food preferences, 3) Monitor BM- provide bowel regimen prn, 4) Provide MVI to ensure 100% RDA are met

## 2022-02-16 NOTE — DIETITIAN INITIAL EVALUATION ADULT. - OTHER INFO
88 y/o M with PMH of Afib/Aflutter s/p cardioversion, HLD, Glaucoma, Hx of prostate cancer presented to ED via EMS for back pain. Pt is a poor historian, currently complaining of back pain and cannot provide further history. According to his wife, he slipped and fell in the bathroom about 2-3 months ago and since have been complaining of back pain which has been progressively getting worse. Wife reports that patient overall has been more forgetful.     Muscle and fat wasting is visible, meeting criteria for PCM. His appetite is poor, only consuming 50% at each meal of clear liquid diet since admission. Currently has no symptoms of N/V/D/C. UBW was obtained: self reported  lbs. Bed scale wt could not be obtained due to pt sitting in chair upon visit- current wt documented 179.8 lbs.

## 2022-02-16 NOTE — DIETITIAN INITIAL EVALUATION ADULT. - ORAL INTAKE PTA/DIET HISTORY
Pt has AMS. Was on clear liquids, diet was advanced to regular. Appetite PTA is unknown and not documented. He has not ordered breakfast since diet advanced, meeting <50% x 2 days.

## 2022-02-16 NOTE — DIETITIAN NUTRITION RISK NOTIFICATION - ADDITIONAL COMMENTS/DIETITIAN RECOMMENDATIONS
1) Continue w/ regular diet w/ Ensure Enlive TID to optimize caloric and protein needs, 2) Encourage protein-rich foods and maximize food preferences, 3) Monitor BM- provide bowel regimen prn, 4) Provide MVI to ensure 100% RDA are met

## 2022-02-16 NOTE — CONSULT NOTE ADULT - ASSESSMENT
87-year-old male with a remote history of prostate cancer approximately 12 years ago admitted with back pain and found to have diffuse osseous metastases.      #Osseous metastatases  -H/o prostate cancer, treated with cryotherapy 12 years ago. Patient was receiving yearly monitoring for recurrence with Urologist, but Urologist moved away last year. No follow up in 1+ year  -Ct abdomen showing multiple sclerotic lesions on vertebrae concerning for metastasis   87-year-old male with a remote history of prostate cancer approximately 12 years ago admitted with back pain and found to have diffuse osseous metastases.      #Osseous metastatases  -H/o prostate cancer ~12 years ago, treated with cryotherapy.   -2/14: CT AP: multiple sclerotic lesions vertebrae concerning for metastasis  - outpt MRI results last month: extensive multifocal marrow replacement throughout the vertebral bodies and iliac bones.   -2/16 Bone scan: uptake in vertebrae, scapula, sternum, ribs, pelvis, proximal femur  -will need bone biopsy.  Discussed with IR, schedule is booked for next 2 days going into the weekend, recommended outpatient bone biopsy.  -will send PSA, myeloma workup   -prostate MRI; per Dr. Woodard, recommended outpt as imaging optimized with outpt scan  -waxing and waning confusion per family; CT head non con negative.  Rec MRI brain

## 2022-02-16 NOTE — CONSULT NOTE ADULT - SUBJECTIVE AND OBJECTIVE BOX
HPI:  86 y/o M with PMHx of Afib/Aflutter s/p cardioversion, HLD, Glaucoma, Hx of prostate cancer presented to ED via EMS for back pain. Patient is poor historian, currenlty complains of back pain and cannot provide further history. Hx obtained from wife over the phone. According to wife, patient slipped and fell in the bathroom about 2-3 months ago and since have been complaining of back pain which has been progressively getting worse. Patient was able to ambulate with cane and then walker since the fall, but for past 5-7 days have been unable to ambulate due to pain. Patient was evaluated by Dr. Sam (ortho spine) and had an o/p MRI (approx 2 weeks) which revealed no acute fx (per wife) but was advised to get bone scan for further eval. Patient was also recently prescribed Tramadol for back pain and since then his mentation has been waxing and waning. Wife reports that patient overall has been more forgetful. At baseline, pt is oriented to self, time and place. Patient has Hx of recurrent LE "cellulitis" and has been evaluated by visiting nurse and has been getting home PT.   In ED, pt was noted to be febrile, tmax: 100.5, HR: 78, BP: 142/77. CT chest/ abdomen-pelvis with IV contrast showed 2cm right renal lesion, acute small bowel diverticulitis and scattered sclerotic bone lesions. Patient received 1 dose of Cefepime and Metronidazole.  (2022 22:25)      PAST MEDICAL & SURGICAL HISTORY:  Afib    HTN (hypertension)    HLD (hyperlipidemia)    History of appendectomy    History of prostate cancer        MEDICATIONS  (STANDING):  atorvastatin 10 milliGRAM(s) Oral at bedtime  ciprofloxacin     Tablet 500 milliGRAM(s) Oral every 12 hours  dofetilide 250 MICROGram(s) Oral two times a day  ferrous    sulfate 325 milliGRAM(s) Oral daily  metroNIDAZOLE    Tablet 500 milliGRAM(s) Oral every 8 hours  polyethylene glycol 3350 17 Gram(s) Oral daily  QUEtiapine 12.5 milliGRAM(s) Oral at bedtime  senna 2 Tablet(s) Oral at bedtime  sodium chloride 0.9% Bolus 500 milliLiter(s) IV Bolus once  timolol 0.25% Solution 1 Drop(s) Both EYES daily    MEDICATIONS  (PRN):  acetaminophen     Tablet .. 650 milliGRAM(s) Oral every 6 hours PRN Temp greater or equal to 38C (100.4F), Mild Pain (1 - 3)  ALBUTerol    90 MICROgram(s) HFA Inhaler 1 Puff(s) Inhalation every 6 hours PRN Bronchospasm  aluminum hydroxide/magnesium hydroxide/simethicone Suspension 30 milliLiter(s) Oral every 4 hours PRN Dyspepsia  melatonin 3 milliGRAM(s) Oral at bedtime PRN Insomnia  ondansetron Injectable 4 milliGRAM(s) IV Push every 8 hours PRN Nausea and/or Vomiting      Allergies    No Known Allergies    Intolerances        FAMILY HISTORY:  Family history non-contributory        Review of Systems    Constitutional, Eyes, ENT, Cardiovascular, Respiratory, Gastrointestinal, Genitourinary, Musculoskeletal, Integumentary, Neurological, Psychiatric, Endocrine, Heme/Lymph and Allergic/Immunologic review of systems are otherwise negative except as noted in HPI.     Vital Signs Last 24 Hrs  T(C): 36.9 (2022 07:08), Max: 37.3 (15 Feb 2022 14:36)  T(F): 98.5 (2022 07:08), Max: 99.1 (15 Feb 2022 14:36)  HR: 73 (2022 07:08) (73 - 90)  BP: 120/84 (2022 07:08) (120/84 - 145/72)  BP(mean): --  RR: 18 (2022 07:08) (18 - 18)  SpO2: 96% (2022 07:08) (96% - 97%)    Physical Exam    Eyes: PERRL, EOMI, no conjunctival infection, anicteric.   ENT: pharynx is unremarkable, moist mucus membrane, no oral lesions.   Neck: supple without JVD, no thyromegaly or masses appreciated.   Pulmonary: clear to auscultation bilaterally, no dullness, no wheezing.   Cardiac: RRR, normal S1S2, no murmurs, rubs, gallops.   Vascular: no JVD, no calf tenderness, venous stasis changes, varices.   Abdomen: normoactive bowel sounds, soft and nontender, no hepatosplenomegaly or masses appreciated.   Ezxtrem:  1+ pedal edema b/l, chronic venous stasis noted      LABS:  CBC Full  -  ( 2022 07:54 )  WBC Count : 9.92 K/uL  RBC Count : 3.99 M/uL  Hemoglobin : 11.5 g/dL  Hematocrit : 36.0 %  Platelet Count - Automated : 239 K/uL  Mean Cell Volume : 90.2 fl  Mean Cell Hemoglobin : 28.8 pg  Mean Cell Hemoglobin Concentration : 31.9 gm/dL  Auto Neutrophil # : x  Auto Lymphocyte # : x  Auto Monocyte # : x  Auto Eosinophil # : x  Auto Basophil # : x  Auto Neutrophil % : x  Auto Lymphocyte % : x  Auto Monocyte % : x  Auto Eosinophil % : x  Auto Basophil % : x    -    138  |  102  |  17  ----------------------------<  99  3.8   |  31  |  0.74    Ca    8.7      2022 07:54    TPro  7.5  /  Alb  2.8<L>  /  TBili  0.8  /  DBili  x   /  AST  33  /  ALT  14  /  AlkPhos  84  02-15    PT/INR - ( 2022 15:21 )   PT: 19.1 sec;   INR: 1.67 ratio         PTT - ( 2022 15:21 )  PTT:33.7 sec  Urinalysis Basic - ( 2022 15:21 )    Color: Briana / Appearance: Clear / S.025 / pH: x  Gluc: x / Ketone: Negative  / Bili: Small / Urobili: 1   Blood: x / Protein: 30 mg/dL / Nitrite: Negative   Leuk Esterase: Trace / RBC: 3-5 /HPF / WBC 0-2   Sq Epi: x / Non Sq Epi: Negative / Bacteria: Moderate        RADIOLOGY & ADDITIONAL STUDIES:    < from: CT Abdomen and Pelvis w/ IV Cont (22 @ 17:20) >  ACC: [id number] EXAM:  CT ABDOMEN AND PELVIS IC                        ACC: [id number] EXAM:  CT CHEST IC                          PROCEDURE DATE:  2022          INTERPRETATION:  CLINICAL INFORMATION: fever JCT    COMPARISON: 16.    CONTRAST/COMPLICATIONS:  IV Contrast: Omnipaque 350 (accession [id number]), IV contrast documented   in associated exam (accession [id number])  90 cc administered   10 cc   discarded  Oral Contrast: NONE  Complications: None reported at time of study completion    PROCEDURE:  CT of the Chest, Abdomen and Pelvis was performed.  Sagittal and coronal reformats were performed.    FINDINGS:  CHEST:  LUNGS AND LARGE AIRWAYS: Patent central airways. No pulmonary nodules.  PLEURA: No pleural effusion.  VESSELS: Within normal limits.  HEART: Heart size is normal.  No pericardial effusion.  MEDIASTINUM AND RAFAEL: No lymphadenopathy.  CHEST WALL AND LOWER NECK: Within normal limits.    ABDOMEN AND PELVIS:  LIVER: Within normal limits.  BILE DUCTS: Normal caliber.  GALLBLADDER: Within normal limits.  SPLEEN: Within normal limits.  PANCREAS: Within normal limits.  ADRENALS: Within normal limits.  KIDNEYS/URETERS: A few cysts. Indeterminate 2 cm right renal lesion   series 2 image 103.    BLADDER: Within normal limits.  REPRODUCTIVE ORGANS: Within normal limits.    BOWEL: No bowel obstruction. Outpouching is noted from small bowel in the   left abdomen on series 2 image 100 with surrounding inflammation.  PERITONEUM: No ascites.  VESSELS:  Within normal limits.  RETROPERITONEUM/LYMPH NODES: No lymphadenopathy.  ABDOMINAL WALL: Within normal limits.  BONES: Multiple scattered sclerotic lesions.    IMPRESSION: Suspicious for acute small bowel diverticulitis.    Scattered sclerotic osseous lesions are indeterminate.    < end of copied text >      < from: NM Bone Imaging Total (22 @ 11:20) >    ACC: [id number] EXAM:  NM BONE IMG WHOLE BODY                          PROCEDURE DATE:  2022          INTERPRETATION:  CLINICAL INFORMATION: 87 year-old male with history of   prostate cancer presents with back pain, sclerotic foci on CT, referred   for evaluation    RADIOPHARMACEUTICAL: 21.3 mCi Tc-99m HDP, I.V.    TECHNIQUE:  Whole-body planar images were obtained in the anterior and   posterior projections approximately 2-3 hours after tracer injection.   Limited study due to poor patient cooperation. Scan of feet were not   acquired.    COMPARISON: CT of the chest, abdomen and pelvis 2022    FINDINGS: There are numerous foci of increased tracer uptake in multiple   bilateral ribs, sternum, bilateral pelvis, thoracic/lumbar/sacral spine,   bilateral scapulae, and bilateral proximal femurs. These findings likely   correspond to sclerotic lesions on diagnostic CT. There is physiologic   tracer distribution in the remainder of the visualized skeleton.    Both kidneys are visualized.    IMPRESSION: Abnormal bone scan    Findings compatible with multifocal osseous metastases.    Degenerative disease in the major joints.    < end of copied text >  
HPI: I tried to contact wife but no answer this AM.    86 y/o M with PMHx of Afib/Aflutter s/p cardioversion, HLD, Glaucoma, Hx of prostate cancer presented to ED via EMS for back pain. Patient is poor historian, currenlty complains of back pain and cannot provide further history. Hx obtained from wife over the phone. According to wife, patient slipped and fell in the bathroom about 2-3 months ago and since have been complaining of back pain which has been progressively getting worse. Patient was able to ambulate with cane and then walker since the fall, but for past 5-7 days have been unable to ambulate due to pain. Patient was evaluated by Dr. Sam (ortho spine) and had an o/p MRI (approx 2 weeks) which revealed no acute fx (per wife) but was advised to get bone scan for further eval. Patient was also recently prescribed Tramadol for back pain and since then his mentation has been waxing and waning. Wife reports that patient overall has been more forgetful. Patient has Hx of recurrent LE "cellulitis" and has been evaluated by visiting nurse and has been getting home PT.     PAST MEDICAL & SURGICAL HISTORY:  History of appendectomy    History of prostate cancer    Afib/A flutter    HLD    Galucoma    MEDICATIONS  (STANDING):  apixaban 5 milliGRAM(s) Oral every 12 hours  atorvastatin 10 milliGRAM(s) Oral at bedtime  cefTRIAXone   IVPB 1000 milliGRAM(s) IV Intermittent every 24 hours  dofetilide 250 MICROGram(s) Oral two times a day  timolol 0.25% Solution 1 Drop(s) Both EYES daily    MEDICATIONS  (PRN):  acetaminophen     Tablet .. 650 milliGRAM(s) Oral every 6 hours PRN Temp greater or equal to 38C (100.4F), Mild Pain (1 - 3)  ALBUTerol    90 MICROgram(s) HFA Inhaler 1 Puff(s) Inhalation every 6 hours PRN Bronchospasm  aluminum hydroxide/magnesium hydroxide/simethicone Suspension 30 milliLiter(s) Oral every 4 hours PRN Dyspepsia  melatonin 3 milliGRAM(s) Oral at bedtime PRN Insomnia  ondansetron Injectable 4 milliGRAM(s) IV Push every 8 hours PRN Nausea and/or Vomiting    Allergies    No Known Allergies    Intolerances    SH: , retired, no EtOH or tob  FH: unable to obtain due to Dementia    ROS c/w HPI otherwise unable to obtain.    PE:    Vital Signs Last 24 Hrs  T(C): 37.3 (15 Feb 2022 08:10), Max: 38.1 (14 Feb 2022 14:55)  T(F): 99.1 (15 Feb 2022 08:10), Max: 100.5 (14 Feb 2022 14:55)  HR: 91 (15 Feb 2022 08:10) (78 - 91)  BP: 149/90 (15 Feb 2022 08:10) (122/64 - 154/70)  BP(mean): 88 (14 Feb 2022 19:42) (88 - 88)  RR: 18 (15 Feb 2022 08:10) (16 - 18)  SpO2: 97% (15 Feb 2022 08:10) (92% - 97%)    Patient sitting in bedside chair-confused. Offers no pain complaints at this time  HEENT unremarkable  Neck supple without tenderness, no adenopathy, thyroid nonpalpable, no bruits  Heart with Irreg rhythm  Abdomen soft, no tenderness or rebound  Extremities with chronic cellulitis/dermatitis LEs, no joint swelling or tenderness  T-L spine-patient does not appear to have any tenderness, no pain with SLR bilaterally, no apparent motor deficits LEs as he moves LEs to command but difficulty to assess against resistance  Neuro-awake and alert, oriented to name, could not give me his phone number    LABS                      12.2   14.77 )-----------( 219      ( 14 Feb 2022 15:21 )             38.4   02-14    133<L>  |  97  |  22  ----------------------------<  97  4.4   |  31  |  0.98    Ca    9.1      14 Feb 2022 15:21    TPro  7.8  /  Alb  3.0<L>  /  TBili  0.9  /  DBili  x   /  AST  27  /  ALT  14  /  AlkPhos  85  02-14    Urine Microscopic-Add On (NC) (02.14.22 @ 15:21)    Granular Cast: 6-10 /LPF    Comment - Urine: Occasional mucous strands    Bacteria: Moderate    Epithelial Cells: Negative    Red Blood Cell - Urine: 3-5 /HPF    White Blood Cell - Urine: 0-2    STUDIES  O/P MRI LS spine  DDD LS spine, moderate stenosis L3/4, multiple sclerotic lesions in vertebral bodies and pelvis. No acute fracture noted.    CT scan C/A/P  KIDNEYS/URETERS: A few cysts. Indeterminate 2 cm right renal lesion   series 2 image 103.  BOWEL: No bowel obstruction. Outpouching is noted from small bowel in the   left abdomen on series 2 image 100 with surrounding inflammation.  BONES: Multiple scattered sclerotic lesions. including vertebral bodies and pelvis

## 2022-02-16 NOTE — DIETITIAN INITIAL EVALUATION ADULT. - PERTINENT MEDS FT
MEDICATIONS  (STANDING):  apixaban 5 milliGRAM(s) Oral every 12 hours  atorvastatin 10 milliGRAM(s) Oral at bedtime  ciprofloxacin     Tablet 500 milliGRAM(s) Oral every 12 hours  dofetilide 250 MICROGram(s) Oral two times a day  ferrous    sulfate 325 milliGRAM(s) Oral daily  metroNIDAZOLE    Tablet 500 milliGRAM(s) Oral every 8 hours  polyethylene glycol 3350 17 Gram(s) Oral daily  QUEtiapine 12.5 milliGRAM(s) Oral at bedtime  senna 2 Tablet(s) Oral at bedtime  timolol 0.25% Solution 1 Drop(s) Both EYES daily    MEDICATIONS  (PRN):  acetaminophen     Tablet .. 650 milliGRAM(s) Oral every 6 hours PRN Temp greater or equal to 38C (100.4F), Mild Pain (1 - 3)  ALBUTerol    90 MICROgram(s) HFA Inhaler 1 Puff(s) Inhalation every 6 hours PRN Bronchospasm  aluminum hydroxide/magnesium hydroxide/simethicone Suspension 30 milliLiter(s) Oral every 4 hours PRN Dyspepsia  ondansetron Injectable 4 milliGRAM(s) IV Push every 8 hours PRN Nausea and/or Vomiting

## 2022-02-17 ENCOUNTER — TRANSCRIPTION ENCOUNTER (OUTPATIENT)
Age: 87
End: 2022-02-17

## 2022-02-17 LAB
ANION GAP SERPL CALC-SCNC: 7 MMOL/L — SIGNIFICANT CHANGE UP (ref 5–17)
BUN SERPL-MCNC: 17 MG/DL — SIGNIFICANT CHANGE UP (ref 7–23)
CALCIUM SERPL-MCNC: 8.6 MG/DL — SIGNIFICANT CHANGE UP (ref 8.5–10.1)
CHLORIDE SERPL-SCNC: 103 MMOL/L — SIGNIFICANT CHANGE UP (ref 96–108)
CO2 SERPL-SCNC: 30 MMOL/L — SIGNIFICANT CHANGE UP (ref 22–31)
CREAT SERPL-MCNC: 0.7 MG/DL — SIGNIFICANT CHANGE UP (ref 0.5–1.3)
GLUCOSE SERPL-MCNC: 93 MG/DL — SIGNIFICANT CHANGE UP (ref 70–99)
POTASSIUM SERPL-MCNC: 3.9 MMOL/L — SIGNIFICANT CHANGE UP (ref 3.5–5.3)
POTASSIUM SERPL-SCNC: 3.9 MMOL/L — SIGNIFICANT CHANGE UP (ref 3.5–5.3)
SODIUM SERPL-SCNC: 140 MMOL/L — SIGNIFICANT CHANGE UP (ref 135–145)

## 2022-02-17 PROCEDURE — 99223 1ST HOSP IP/OBS HIGH 75: CPT

## 2022-02-17 PROCEDURE — 99255 IP/OBS CONSLTJ NEW/EST HI 80: CPT

## 2022-02-17 PROCEDURE — 99232 SBSQ HOSP IP/OBS MODERATE 35: CPT | Mod: GC

## 2022-02-17 RX ADMIN — Medication 500 MILLIGRAM(S): at 09:13

## 2022-02-17 RX ADMIN — LIDOCAINE 1 PATCH: 4 CREAM TOPICAL at 07:02

## 2022-02-17 RX ADMIN — SODIUM CHLORIDE 1000 MILLILITER(S): 9 INJECTION INTRAMUSCULAR; INTRAVENOUS; SUBCUTANEOUS at 14:38

## 2022-02-17 RX ADMIN — POLYETHYLENE GLYCOL 3350 17 GRAM(S): 17 POWDER, FOR SOLUTION ORAL at 09:13

## 2022-02-17 RX ADMIN — APIXABAN 5 MILLIGRAM(S): 2.5 TABLET, FILM COATED ORAL at 09:13

## 2022-02-17 RX ADMIN — Medication 500 MILLIGRAM(S): at 14:38

## 2022-02-17 RX ADMIN — DOFETILIDE 250 MICROGRAM(S): 0.25 CAPSULE ORAL at 09:13

## 2022-02-17 RX ADMIN — Medication 325 MILLIGRAM(S): at 09:13

## 2022-02-17 RX ADMIN — Medication 500 MILLIGRAM(S): at 05:54

## 2022-02-17 RX ADMIN — Medication 1 DROP(S): at 09:14

## 2022-02-17 NOTE — DISCHARGE NOTE PROVIDER - PROVIDER TOKENS
PROVIDER:[TOKEN:[[id number]]] PROVIDER:[TOKEN:[[id number]]],PROVIDER:[TOKEN:[[id number]],FOLLOWUP:[1 week]]

## 2022-02-17 NOTE — DISCHARGE NOTE PROVIDER - CARE PROVIDERS DIRECT ADDRESSES
,[email or web address]

## 2022-02-17 NOTE — DISCHARGE NOTE PROVIDER - NSDCCPCAREPLAN_GEN_ALL_CORE_FT
PRINCIPAL DISCHARGE DIAGNOSIS  Diagnosis: Acute metabolic encephalopathy  Assessment and Plan of Treatment:        PRINCIPAL DISCHARGE DIAGNOSIS  Diagnosis: Acute metabolic encephalopathy  Assessment and Plan of Treatment: You were admitted with altered mental status. You were found to have a positive UTI and your CT abdomen was suggestive of small bowel diverticulitis. You were started on antibiotics. We will send you home with antibiotics        SECONDARY DISCHARGE DIAGNOSES  Diagnosis: Back pain  Assessment and Plan of Treatment: You came in with symptom of back pain. Your CT abdomen was suggestive of scerotic bone lesions. Orthopedic surgery consulted. Bone scan done showed osseous metastases. Hematology/oncology consulted, ordered PSA, multiple myeloma workup. MRI head and MRI prostate ordered. You will bone biopsy outpatient for further evaluation. Please followup with heme.onc outpatient.     PRINCIPAL DISCHARGE DIAGNOSIS  Diagnosis: Acute metabolic encephalopathy  Assessment and Plan of Treatment: You were admitted with altered mental status possibly medication induced vs infectious. In the ED you had a fever and elevated white count. Your CT head was negative. You were found to have a UTI and your CT abdomen was suggestive of small bowel diverticulitis. You were started on antibiotics. We will send you home with antibiotics ciprofloaxcin and metronidazole. Avoid tramadol for pain, we will send you home with lidocaine patch for back pain.         SECONDARY DISCHARGE DIAGNOSES  Diagnosis: Back pain  Assessment and Plan of Treatment: You came in with symptom of back pain.  Your CT abdomen was suggestive of scerotic bone lesions. Orthopedic surgery consulted. Bone scan done showed osseous metastases. Hematology/oncology consulted, ordered PSA, multiple myeloma workup. MRI head and MRI prostate ordered. You will bone biopsy outpatient for further evaluation. Please followup with heme/onc outpatient. We will send you home with lidocaine patch for pain after discharge     PRINCIPAL DISCHARGE DIAGNOSIS  Diagnosis: Acute metabolic encephalopathy  Assessment and Plan of Treatment: You were admitted with altered mental status possibly medication induced vs infectious. In the ED you had a fever and elevated white count. Your CT head was negative. You were found to have a UTI and your CT abdomen was suggestive of small bowel diverticulitis. You were started on antibiotics. We will send you home with antibiotics ciprofloaxcin and metronidazole for 3 more days. Avoid tramadol for pain, we will send you home with lidocaine patch for back pain.         SECONDARY DISCHARGE DIAGNOSES  Diagnosis: Back pain  Assessment and Plan of Treatment: You came in with symptom of back pain.  Your CT abdomen was suggestive of scerotic bone lesions. Orthopedic surgery consulted. Bone scan done showed osseous metastases. Hematology/oncology consulted. MRI head and MRI prostate ordered but unable to be done here as not able to fit MRI due to kyphosis. You will need to f/u with hematology/oncology outpatient bone biopsy outpatient and imaging for further evaluation. We will send you home with lidocaine patch for pain after discharge for your back pain  Please followup with hematologist/oncologist Dr. Edouard outpatient.

## 2022-02-17 NOTE — DISCHARGE NOTE PROVIDER - HOSPITAL COURSE
DISCHARGE NOTE INCOMPLETE     HPI: 88 y/o M with PMHx of Afib/Aflutter s/p cardioversion, HLD, Glaucoma, Hx of prostate cancer presented to ED via EMS for back pain. Patient is poor historian, currently complains of back pain and cannot provide further history. According to wife, patient slipped and fell in the bathroom about 2-3 months ago and since have been complaining of back pain which has been progressively getting worse. Patient was able to ambulate with cane and then walker since the fall, but for past 5-7 days have been unable to ambulate due to pain. Patient was evaluated by Dr. Sam (ortho spine) and had an o/p MRI (approx 2 weeks) which revealed no acute fx (per wife) but was advised to get bone scan for further eval. Patient was also recently prescribed Tramadol for back pain and since then his mentation has been waxing and waning. Wife reports that patient overall has been more forgetful. At baseline, pt is oriented to self, time and place.  In ED, pt was noted to be febrile, tmax: 100.5, HR: 78, BP: 142/77. CT chest/ abdomen-pelvis with IV contrast showed 2cm right renal lesion, acute small bowel diverticulitis and scattered sclerotic bone lesions. Patient received 1 dose of Cefepime and Metronidazole. Pt admitted with acute metabolic encephalopathy likely 2/2 UTI vs diverticulitis. Cipro and flagyl started. Orthopedic surgery consulted for back pain. Bone scan done showed osseous metastases. Hematology/oncology consulted, ordered PSA, multiple myeloma workup. MRI head and MRI prostate ordered. Pt also need bone biopsy which will get outpatient due to IR schedule. Pt currently hemodynamically stable, afebrile for discharge. Pt should followup with PCP and Heme/onc outpatient.     Vital Signs Last 24 Hrs  T(C): 37.3 (17 Feb 2022 14:39), Max: 37.7 (16 Feb 2022 16:01)  T(F): 99.1 (17 Feb 2022 14:39), Max: 99.8 (16 Feb 2022 16:01)  HR: 92 (17 Feb 2022 14:39) (77 - 92)  BP: 132/61 (17 Feb 2022 14:39) (103/67 - 139/71)  BP(mean): --  RR: 17 (17 Feb 2022 14:39) (17 - 20)  SpO2: 100% (17 Feb 2022 14:39) (95% - 100%)    PHYSICAL EXAM:  GENERAL: NAD, lying in bed comfortably  HEAD:  Atraumatic, Normocephalic  EYES: EOMI, PERRLA, conjunctiva and sclera clear  ENT: Moist mucous membranes  NECK: Supple, No JVD  CHEST/LUNG: Clear to auscultation bilaterally, good air entry bilaterally; No wheezing, rales, or rhonchi. Unlabored respirations  HEART: Regular rate and rhythm. S1 and S2. No murmurs, rubs, or gallops  ABDOMEN: Soft, Nontender, Nondistended. Bowel sounds present x4 quadrants; No hepatomegaly. No splenomegaly.  EXTREMITIES:  2+ Peripheral Pulses. Capillary refill <2 seconds. No clubbing, cyanosis, or edema  NERVOUS SYSTEM:  Alert & Oriented X3, speech clear. No deficits   MSK: FROM all 4 extremities, full and equal strength  SKIN: No rashes, bruises, or other lesions    RADIOLOGY:  < from: CT Chest w/ IV Cont (02.14.22 @ 17:19) >    IMPRESSION: Suspicious for acute small bowel diverticulitis.    Scattered sclerotic osseous lesions are indeterminate.    --- End of Report ---    < end of copied text >    < from: CT Head No Cont (02.15.22 @ 00:46) >      IMPRESSION:    No skull fracture, intracranial hemorrhage or mass effect.    < end of copied text >    < from: [facility] Total (02.16.22 @ 11:20) >    IMPRESSION: Abnormal bone scan    Findings compatible with multifocal osseous metastases.    Degenerative disease in the major joints.    < end of copied text >     DISCHARGE NOTE INCOMPLETE     HPI: 86 y/o M with PMHx of Afib/Aflutter s/p cardioversion, HLD, Glaucoma, Hx of prostate cancer presented to ED via EMS for back pain. Patient is poor historian, currently complains of back pain and cannot provide further history. According to wife, patient slipped and fell in the bathroom about 2-3 months ago and since have been complaining of back pain which has been progressively getting worse. Patient was able to ambulate with cane and then walker since the fall, but for past 5-7 days have been unable to ambulate due to pain. Patient was evaluated by Dr. Sam (ortho spine) and had an o/p MRI (approx 2 weeks) which revealed no acute fx (per wife) but was advised to get bone scan for further eval. Patient was also recently prescribed Tramadol for back pain and since then his mentation has been waxing and waning. Wife reports that patient overall has been more forgetful. At baseline, pt is oriented to self, time and place.  In ED, pt was noted to be febrile, tmax: 100.5, HR: 78, BP: 142/77. CT chest/ abdomen-pelvis with IV contrast showed 2cm right renal lesion, acute small bowel diverticulitis and scattered sclerotic bone lesions. Patient received 1 dose of Cefepime and Metronidazole. Pt admitted with acute metabolic encephalopathy likely 2/2 in setting of infection (UTI, diverticulitis) vs medication induced Cipro and flagyl started. Orthopedic surgery consulted for back pain. Bone scan done showed osseous metastases. Hematology/oncology consulted, ordered PSA, multiple myeloma workup. MRI head and MRI prostate ordered. Pt also need bone biopsy which will get outpatient due to IR schedule. Pt currently hemodynamically stable, afebrile for discharge. Pt should followup with PCP and Heme/onc outpatient.     Vital Signs Last 24 Hrs  T(C): 37.3 (17 Feb 2022 14:39), Max: 37.7 (16 Feb 2022 16:01)  T(F): 99.1 (17 Feb 2022 14:39), Max: 99.8 (16 Feb 2022 16:01)  HR: 92 (17 Feb 2022 14:39) (77 - 92)  BP: 132/61 (17 Feb 2022 14:39) (103/67 - 139/71)  BP(mean): --  RR: 17 (17 Feb 2022 14:39) (17 - 20)  SpO2: 100% (17 Feb 2022 14:39) (95% - 100%)    PHYSICAL EXAM:  GENERAL: NAD, lying in bed comfortably  HEAD:  Atraumatic, Normocephalic  EYES: EOMI, PERRLA, conjunctiva and sclera clear  ENT: Moist mucous membranes  NECK: Supple, No JVD  CHEST/LUNG: Clear to auscultation bilaterally, good air entry bilaterally; No wheezing, rales, or rhonchi. Unlabored respirations  HEART: Regular rate and rhythm. S1 and S2. No murmurs, rubs, or gallops  ABDOMEN: Soft, Nontender, Nondistended. Bowel sounds present x4 quadrants; No hepatomegaly. No splenomegaly.  EXTREMITIES:  2+ Peripheral Pulses. Capillary refill <2 seconds. No clubbing, cyanosis, or edema  NERVOUS SYSTEM:  Alert & Oriented X3, speech clear. No deficits   MSK: FROM all 4 extremities, full and equal strength  SKIN: No rashes, bruises, or other lesions    RADIOLOGY:  < from: CT Chest w/ IV Cont (02.14.22 @ 17:19) >    IMPRESSION: Suspicious for acute small bowel diverticulitis.    Scattered sclerotic osseous lesions are indeterminate.    --- End of Report ---    < end of copied text >    < from: CT Head No Cont (02.15.22 @ 00:46) >      IMPRESSION:    No skull fracture, intracranial hemorrhage or mass effect.    < end of copied text >    < from: [facility] (02.16.22 @ 11:20) >    IMPRESSION: Abnormal bone scan    Findings compatible with multifocal osseous metastases.    Degenerative disease in the major joints.    < end of copied text >     HPI: 88 y/o M with PMHx of Afib/Aflutter s/p cardioversion, HLD, Glaucoma, Hx of prostate cancer presented to ED via EMS for back pain. Patient is poor historian, currently complains of back pain and cannot provide further history. According to wife, patient slipped and fell in the bathroom about 2-3 months ago and since have been complaining of back pain which has been progressively getting worse. Patient was able to ambulate with cane and then walker since the fall, but for past 5-7 days have been unable to ambulate due to pain. Patient was evaluated by Dr. Sam (ortho spine) and had an o/p MRI (approx 2 weeks) which revealed no acute fx (per wife) but was advised to get bone scan for further eval. Patient was also recently prescribed Tramadol for back pain and since then his mentation has been waxing and waning. Wife reports that patient overall has been more forgetful. At baseline, pt is oriented to self, time and place.  In ED, pt was noted to be febrile, tmax: 100.5, HR: 78, BP: 142/77. CT chest/ abdomen-pelvis with IV contrast showed 2cm right renal lesion, acute small bowel diverticulitis and scattered sclerotic bone lesions. Patient received 1 dose of Cefepime and Metronidazole. Pt admitted with acute metabolic encephalopathy likely 2/2 in setting of infection (UTI, diverticulitis) vs medication induced Cipro and flagyl started. Orthopedic surgery consulted for back pain. Bone scan done showed osseous metastases. Hematology/oncology consulted, ordered PSA, multiple myeloma workup. MRI head and MRI prostate ordered however pt unable to get MRI as unable to fit in machine due to kyphosis, will f/u outpatient for possible standing MRI. Pt also need bone biopsy which will get outpatient due to IR schedule. Pt currently hemodynamically stable, afebrile for discharge. Pt should followup with PCP and Heme/onc Dr. Edouard outpatient.     Vital Signs Last 24 Hrs  T(C): 36.8 (18 Feb 2022 07:57), Max: 37.6 (17 Feb 2022 22:50)  T(F): 98.3 (18 Feb 2022 07:57), Max: 99.7 (17 Feb 2022 22:50)  HR: 75 (18 Feb 2022 07:57) (75 - 92)  BP: 130/63 (18 Feb 2022 07:57) (130/63 - 137/60)  BP(mean): 79 (17 Feb 2022 22:50) (79 - 79)  RR: 18 (18 Feb 2022 07:57) (17 - 18)  SpO2: 97% (18 Feb 2022 07:57) (96% - 100%)    PHYSICAL EXAM:  GENERAL: NAD, lying in bed comfortably  HEAD:  Atraumatic, Normocephalic  EYES: EOMI, PERRLA, conjunctiva and sclera clear  ENT: Moist mucous membranes  NECK: Supple, No JVD  CHEST/LUNG: Clear to auscultation bilaterally, good air entry bilaterally; No wheezing, rales, or rhonchi. Unlabored respirations  HEART: Regular rate and rhythm. S1 and S2. No murmurs, rubs, or gallops  ABDOMEN: Soft, Nontender, Nondistended. Bowel sounds present x4 quadrants; No hepatomegaly. No splenomegaly.  EXTREMITIES:  2+ Peripheral Pulses. Capillary refill <2 seconds. No clubbing, cyanosis, or edema  NERVOUS SYSTEM:  Alert & Oriented X3, speech clear. No deficits   MSK: FROM all 4 extremities, full and equal strength  SKIN: No rashes, bruises, or other lesions    RADIOLOGY:  < from: CT Chest w/ IV Cont (02.14.22 @ 17:19) >    IMPRESSION: Suspicious for acute small bowel diverticulitis.    Scattered sclerotic osseous lesions are indeterminate.    --- End of Report ---    < end of copied text >    < from: CT Head No Cont (02.15.22 @ 00:46) >      IMPRESSION:    No skull fracture, intracranial hemorrhage or mass effect.    < end of copied text >    < from: [facility] (02.16.22 @ 11:20) >    IMPRESSION: Abnormal bone scan    Findings compatible with multifocal osseous metastases.    Degenerative disease in the major joints.    < end of copied text >

## 2022-02-17 NOTE — DISCHARGE NOTE PROVIDER - CARE PROVIDER_API CALL
Kavya Edouard)  Medical Oncology  [address] [place] [address]  Phone: [phone]  Fax: [phone]  Follow Up Time:    Kavya Edouard)  Medical Oncology  [address] [place] [address]  Phone: [phone]  Fax: [phone]  Follow Up Time:     Bess Fitzpatrick  HEMATOLOGY  [address] [facility], [place] [address]  Phone: [phone]  Fax: [phone]  Follow Up Time: 1 week

## 2022-02-17 NOTE — DISCHARGE NOTE PROVIDER - DETAILS OF MALNUTRITION DIAGNOSIS/DIAGNOSES
This patient has been assessed with a concern for Malnutrition and was treated during this hospitalization for the following Nutrition diagnosis/diagnoses:     -  02/16/2022: Severe protein-calorie malnutrition

## 2022-02-17 NOTE — DISCHARGE NOTE PROVIDER - NSDCMRMEDTOKEN_GEN_ALL_CORE_FT
Albuterol (Eqv-Proventil HFA) 90 mcg/inh inhalation aerosol: 1 puff(s) inhaled every 6 hours, As Needed  apixaban 5 mg oral tablet: 1 tab(s) orally 2 times a day  atorvastatin 10 mg oral tablet: 1 tab(s) orally once a day  dofetilide 250 mcg oral capsule: 1 cap(s) orally 2 times a day  timolol maleate 0.25% ophthalmic solution: 1 drop(s) to each affected eye once a day  traMADol 50 mg oral tablet: 1-2 tab(s) orally 4 to 6 times a day, as needed  Tylenol 325 mg oral tablet: 2 tab(s) orally every 4 hours, As Needed   Albuterol (Eqv-Proventil HFA) 90 mcg/inh inhalation aerosol: 1 puff(s) inhaled every 6 hours, As Needed  apixaban 5 mg oral tablet: 1 tab(s) orally 2 times a day  atorvastatin 10 mg oral tablet: 1 tab(s) orally once a day  ciprofloxacin 500 mg oral tablet: 1 tab(s) orally every 12 hours  dofetilide 250 mcg oral capsule: 1 cap(s) orally 2 times a day  lidocaine 4% topical film: Apply topically to affected area every 24 hours   metroNIDAZOLE 500 mg oral tablet: 1 tab(s) orally every 8 hours  timolol maleate 0.25% ophthalmic solution: 1 drop(s) to each affected eye once a day  Tylenol 325 mg oral tablet: 2 tab(s) orally every 4 hours, As Needed

## 2022-02-18 ENCOUNTER — TRANSCRIPTION ENCOUNTER (OUTPATIENT)
Age: 87
End: 2022-02-18

## 2022-02-18 VITALS
TEMPERATURE: 98 F | DIASTOLIC BLOOD PRESSURE: 63 MMHG | OXYGEN SATURATION: 97 % | RESPIRATION RATE: 18 BRPM | HEART RATE: 75 BPM | SYSTOLIC BLOOD PRESSURE: 130 MMHG

## 2022-02-18 PROCEDURE — 99238 HOSP IP/OBS DSCHRG MGMT 30/<: CPT | Mod: GC

## 2022-02-18 RX ORDER — TRAMADOL HYDROCHLORIDE 50 MG/1
1 TABLET ORAL
Qty: 0 | Refills: 0 | DISCHARGE

## 2022-02-18 RX ORDER — CIPROFLOXACIN LACTATE 400MG/40ML
1 VIAL (ML) INTRAVENOUS
Qty: 6 | Refills: 0
Start: 2022-02-18 | End: 2022-02-20

## 2022-02-18 RX ORDER — METRONIDAZOLE 500 MG
1 TABLET ORAL
Qty: 9 | Refills: 0
Start: 2022-02-18 | End: 2022-02-20

## 2022-02-18 RX ORDER — LIDOCAINE 4 G/100G
1 CREAM TOPICAL
Qty: 15 | Refills: 0
Start: 2022-02-18 | End: 2022-03-04

## 2022-02-18 RX ADMIN — Medication 500 MILLIGRAM(S): at 05:24

## 2022-02-18 RX ADMIN — APIXABAN 5 MILLIGRAM(S): 2.5 TABLET, FILM COATED ORAL at 10:25

## 2022-02-18 RX ADMIN — Medication 1 DROP(S): at 10:25

## 2022-02-18 RX ADMIN — Medication 500 MILLIGRAM(S): at 15:10

## 2022-02-18 RX ADMIN — Medication 325 MILLIGRAM(S): at 10:24

## 2022-02-18 RX ADMIN — POLYETHYLENE GLYCOL 3350 17 GRAM(S): 17 POWDER, FOR SOLUTION ORAL at 10:25

## 2022-02-18 RX ADMIN — LIDOCAINE 1 PATCH: 4 CREAM TOPICAL at 05:00

## 2022-02-18 RX ADMIN — LIDOCAINE 1 PATCH: 4 CREAM TOPICAL at 05:47

## 2022-02-18 RX ADMIN — Medication 500 MILLIGRAM(S): at 10:25

## 2022-02-18 RX ADMIN — DOFETILIDE 250 MICROGRAM(S): 0.25 CAPSULE ORAL at 10:25

## 2022-02-18 NOTE — PROGRESS NOTE ADULT - ATTENDING COMMENTS
88 y/o M with PMHx of Afib/Aflutter s/p cardioversion, HLD, Glaucoma, Hx of prostate cancer presented to ED via EMS for back pain    #Acute Toxic-Metobolic Encephalopathy   -Etiology unclear. Confirmed with family at bedside, patient clearly confused compared to baseline, fixated on wanting to go home.   -(+)SIRS criteria: (+)Leukocytosis, (+)Fever.   - WBC 14>13  -CT head: neg  -CT chest/abdomen/plevis with IV contrast: small 2 cm right renal lesion, acute small bowel diverticulitis, scattered sclerotic lesions   -Cipro+flagyl started 2/15 to cover diverticulitis
86 y/o M with PMHx of Afib/Aflutter s/p cardioversion, HLD, Glaucoma, Hx of prostate cancer presented to ED via EMS for back pain    #Acute Toxic-Metobolic Encephalopathy   -Etiology seems to be multifactorial, related to medications vs infection. Patient was taking Tramadol outpatient at time of presentation. After Xanax 2/16 mental status worsened.   -(+)SIRS criteria: (+)Leukocytosis, (+)Fever.   - WBC 14>13>9 resolved  -CT head: neg  -CT chest/abdomen/plevis with IV contrast: small 2 cm right renal lesion, acute small bowel diverticulitis, scattered sclerotic lesions   -Cipro+flagyl started 2/15 to cover
88 y/o M with PMHx of Afib/Aflutter s/p cardioversion, HLD, Glaucoma, Hx of prostate cancer presented to ED via EMS for back pain    #Acute Toxic-Metobolic Encephalopathy   -Etiology seems to be multifactorial, related to medications vs infection. Patient was taking Tramadol outpatient at time of presentation. After Xanax 2/16 mental status worsened.   -(+)SIRS criteria: (+)Leukocytosis, (+)Fever.   - WBC 14>13>9 resolved  -CT head: neg  -CT chest/abdomen/plevis with IV contrast: small 2 cm right renal lesion, acute small bowel diverticulitis, scattered sclerotic lesions   -Cipro+flagyl started 2/15 to cover diverticulitis
Patient is a 87 y.o. male admitted with back pain and PMH of A-fib/Aflutter s/p CV and h/o prostate CA admitted with Acute Toxic Metabolic Encephalopathy due possibly to acute small bowel diverticulitis. Nuclear bone scan showed numerous foci compatible with osseous metastases. Will obtain outpatient biopsy as IR booked for next several days.

## 2022-02-18 NOTE — PROGRESS NOTE ADULT - ASSESSMENT
86 y/o M with PMHx of Afib/Aflutter s/p cardioversion, HLD, Glaucoma, Hx of prostate cancer presented to ED via EMS for back pain    #Acute Toxic-Metobolic Encephalopathy   -Etiology seems to be multifactorial, related to medications vs infection. Patient was taking Tramadol outpatient at time of presentation. After Xanax 2/16 mental status worsened.   -(+)SIRS criteria: (+)Leukocytosis, (+)Fever.   - WBC 14>13>9 resolved  -CT head: neg  -CT chest/abdomen/plevis with IV contrast: small 2 cm right renal lesion, acute small bowel diverticulitis, scattered sclerotic lesions   -Cipro+flagyl started 2/15 to cover diverticulitis    #Back pain / Sclerotic vertebral lesions  -H/o prostate cancer, treated with cryotherapy 12 years ago. Patient was receiving yearly monitoring for recurrence with Urologist, but Urologist moved away last year. No follow up in 1+ year  -Ct abdomen showing multiple sclerotic lesions on vertebrae concerning for metastasis  -Ortho recs appreciated, manage medically  -Outpatient he was getting Tramadol. Since taking it, family states his mentation had worsened.   -Lidocaine patch placed for back pain, to be continued after DC.   -2/16: MRI results from last month: extensive multifocal marrow replacement throughout the vertebral bodies and iliac bones.   -2/16 Bone scan showing uptake in vertebrae, scapula, sternum, ribs, pelvis, proximal femur  -Spoke with patient's wife and daughter at bedside with the patient, they are willing to pursue investigation and treatment at this time.   -Heme Onc recs appreciated: Spoke with Dr. Edouard. PSA, MM labs, prostate MRI, head MRI, bone biopsy.   -Spoke with IR, schedule is booked for next 2 days going into the weekend, recommend outpatient bone biopsy.  -2/17 difficulty performing MRI 2/2 kyphosis.   -Patient will require outpatient MRI with additional accommodations / standing MRI  -F/u with Dr. Edouard    #LE Venous Stasis Dermatitis   -no evidence of cellulitis  -wound care consult, daily dressing changes    #Anemia   -serial H&H   -occult blood negative  -Low iron, low TIBC, normal ferritin -> Anemia of chronic disease  -Trend    #Hyponatremia   -133>134>138  -resolved    #Hx of Afib/Aflutter s/p cardioversion   -currently in NSR   -c/w Eliquis 5mg BID   -c/w Dofetilide 250mg BID     #HLD   -c/w Atorvastatin 10mg     #Glaucoma   -c/w Timolol     #DVT prophylaxis   -on Eliquis     #DIET   -Regular    #Advanced Directive   -FULL CODE     #Dispo  -Outpatient MRI appointment  -Outpatient IR appointment for bone biopsy  -Patient will follow up outpatient with Dr. Reggie Owusu d/w Dr. Jiang   
88 y/o M with PMHx of Afib/Aflutter s/p cardioversion, HLD, Glaucoma, Hx of prostate cancer presented to ED via EMS for back pain    #Acute Toxic-Metobolic Encephalopathy   -Etiology seems to be multifactorial, related to medications vs infection. Patient was taking Tramadol outpatient at time of presentation. After Xanax 2/16 mental status worsened.   -(+)SIRS criteria: (+)Leukocytosis, (+)Fever.   - WBC 14>13>9 resolved  -CT head: neg  -CT chest/abdomen/plevis with IV contrast: small 2 cm right renal lesion, acute small bowel diverticulitis, scattered sclerotic lesions   -Cipro+flagyl started 2/15 to cover diverticulitis    #Back pain / Sclerotic vertebral lesions  -H/o prostate cancer, treated with cryotherapy 12 years ago. Patient was receiving yearly monitoring for recurrence with Urologist, but Urologist moved away last year. No follow up in 1+ year  -Ct abdomen showing multiple sclerotic lesions on vertebrae concerning for metastasis  -Ortho recs appreciated, manage medically  -Outpatient he was getting Tramadol. Since taking it, family states his mentation had worsened.   -Lidocaine patch placed for back pain, to be continued after DC.   -2/16: MRI results from last month: extensive multifocal marrow replacement throughout the vertebral bodies and iliac bones.   -2/16 Bone scan showing uptake in vertebrae, scapula, sternum, ribs, pelvis, proximal femur  -Spoke with patient's wife and daughter at bedside with the patient, they are willing to pursue investigation and treatment at this time.   -Heme Onc recs appreciated: Spoke with Dr. Edouard. PSA, MM labs, prostate MRI, head MRI, bone biopsy.   -Spoke with IR, schedule is booked for next 2 days going into the weekend, recommend outpatient bone biopsy.    -UA: (+)trace LE, (+)hematuria   -No symptoms of UTI  -Urince cx <10k  -DC rocephin    #LE Venous Stasis Dermatitis   -no evidence of cellulitis  -wound care consult, daily dressing changes    #Anemia   -serial H&H   -occult blood negative  -Low iron with low TIBC, possible mixed anemia picture.  -F/u Ferritin    #Hyponatremia   -133>134>138  -resolved    #Hx of Afib/Aflutter s/p cardioversion   -currently in NSR   -c/w Eliquis 5mg BID   -c/w Dofetilide 250mg BID     #HLD   -c/w Atorvastatin 10mg     #Glaucoma   -c/w Timolol     #DVT prophylaxis   -on Eliquis     #DIET   -Regular    #Advanced Directive   -FULL CODE     #Dispo  -Pending MRI prostate and head with IV contrast  -Outpatient IR appointment for bone biopsy  -DC planning possibly for 2/17    Case d/w Dr. Jiang   
88 y/o M with PMHx of Afib/Aflutter s/p cardioversion, HLD, Glaucoma, Hx of prostate cancer presented to ED via EMS for back pain    #Acute Toxic-Metobolic Encephalopathy   -Etiology seems to be multifactorial, related to medications vs infection. Patient was taking Tramadol outpatient at time of presentation. After Xanax 2/16 mental status worsened.   -(+)SIRS criteria: (+)Leukocytosis, (+)Fever.   - WBC 14>13>9 resolved  -CT head: neg  -CT chest/abdomen/plevis with IV contrast: small 2 cm right renal lesion, acute small bowel diverticulitis, scattered sclerotic lesions   -Cipro+flagyl started 2/15 to cover diverticulitis    #Back pain / Sclerotic vertebral lesions  -H/o prostate cancer, treated with cryotherapy 12 years ago. Patient was receiving yearly monitoring for recurrence with Urologist, but Urologist moved away last year. No follow up in 1+ year  -Ct abdomen showing multiple sclerotic lesions on vertebrae concerning for metastasis  -Ortho recs appreciated, manage medically  -Outpatient he was getting Tramadol. Since taking it, family states his mentation had worsened.   -Lidocaine patch placed for back pain, to be continued after DC.   -2/16: MRI results from last month: extensive multifocal marrow replacement throughout the vertebral bodies and iliac bones.   -2/16 Bone scan showing uptake in vertebrae, scapula, sternum, ribs, pelvis, proximal femur  -Spoke with patient's wife and daughter at bedside with the patient, they are willing to pursue investigation and treatment at this time.   -Heme Onc recs appreciated: Spoke with Dr. Edouard. PSA, MM labs, prostate MRI, head MRI, bone biopsy.   -Spoke with IR, schedule is booked for next 2 days going into the weekend, recommend outpatient bone biopsy.  -2/18 difficulty performing MRI 2/2 kyphosis. May require outpatient standing MRI?    #LE Venous Stasis Dermatitis   -no evidence of cellulitis  -wound care consult, daily dressing changes    #Anemia   -serial H&H   -occult blood negative  -Low iron, low TIBC, normal ferritin -> Anemia of chronic disease  -Trend    #Hyponatremia   -133>134>138  -resolved    #Hx of Afib/Aflutter s/p cardioversion   -currently in NSR   -c/w Eliquis 5mg BID   -c/w Dofetilide 250mg BID     #HLD   -c/w Atorvastatin 10mg     #Glaucoma   -c/w Timolol     #DVT prophylaxis   -on Eliquis     #DIET   -Regular    #Advanced Directive   -FULL CODE     #Dispo  -Pending MRI prostate and head with IV contrast -> unable to fit 2/2 kyphosis, possible for standing MRI outpatient?   -Outpatient IR appointment for bone biopsy  -Patient will follow up outpatient with Dr. Reggie Owusu d/w Dr. Jiang   
HPI:  88 y/o M with PMHx of Afib/Aflutter s/p cardioversion, HLD, Glaucoma, Hx of prostate cancer presented to ED via EMS for back pain. Patient is poor historian, currenlty complains of back pain and cannot provide further history. Hx obtained from wife over the phone. According to wife, patient slipped and fell in the bathroom about 2-3 months ago and since have been complaining of back pain which has been progressively getting worse. Patient was able to ambulate with cane and then walker since the fall, but for past 5-7 days have been unable to ambulate due to pain. Patient was evaluated by Dr. Sam (ortho spine) and had an o/p MRI (approx 2 weeks) which revealed no acute fx (per wife) but was advised to get bone scan for further eval. Patient was also recently prescribed Tramadol for back pain and since then his mentation has been waxing and waning. Wife reports that patient overall has been more forgetful. Patient has Hx of recurrent LE "cellulitis" and has been evaluated by visiting nurse and has been getting home PT.     IMP:   Multiple sclerotic bony metastases   Hx Prostate CA  DDD LS spine  Lumbar spinal stenosis  Dementia    PLAN:  Patient admitted to Medicine  Analgesia sparingly due to change in MS  Medical management  No impending spinal fracutres  Hem/Onc assessment and recommendation noted.
HPI:  88 y/o M with PMHx of Afib/Aflutter s/p cardioversion, HLD, Glaucoma, Hx of prostate cancer presented to ED via EMS for back pain. Patient is poor historian, currenlty complains of back pain and cannot provide further history. Hx obtained from wife over the phone. According to wife, patient slipped and fell in the bathroom about 2-3 months ago and since have been complaining of back pain which has been progressively getting worse. Patient was able to ambulate with cane and then walker since the fall, but for past 5-7 days have been unable to ambulate due to pain. Patient was evaluated by Dr. Sam (ortho spine) and had an o/p MRI (approx 2 weeks) which revealed no acute fx (per wife) but was advised to get bone scan for further eval. Patient was also recently prescribed Tramadol for back pain and since then his mentation has been waxing and waning. Wife reports that patient overall has been more forgetful. Patient has Hx of recurrent LE "cellulitis" and has been evaluated by visiting nurse and has been getting home PT.     IMP:   Multiple sclerotic bony metastases   Hx Prostate CA  DDD LS spine  Lumbar spinal stenosis  Dementia    PLAN:  Patient admitted to Medicine  Analgesia sparingly due to change in MS  Medical management  No impending spinal fracutres  Hem/Onc assessment and recommendation noted.
88 y/o M with PMHx of Afib/Aflutter s/p cardioversion, HLD, Glaucoma, Hx of prostate cancer presented to ED via EMS for back pain    #Acute Toxic-Metobolic Encephalopathy   -Etiology unclear. Confirmed with family at bedside, patient clearly confused compared to baseline, fixated on wanting to go home.   -(+)SIRS criteria: (+)Leukocytosis, (+)Fever.   - WBC 14>13  -CT head: neg  -CT chest/abdomen/plevis with IV contrast: small 2 cm right renal lesion, acute small bowel diverticulitis, scattered sclerotic lesions   -Cipro+flagyl started 2/15 to cover diverticulitis    #Back pain / Sclerotic vertebral lesions  -H/o prostate cancer, treated with cryotherapy 12 years ago. Patient was receiving yearly monitoring for recurrence with Urologist, but Urologist moved away last year. No follow up in 1+ year  -Ct abdomen showing multiple sclerotic lesions on vertebrae concerning for metastasis  -Ortho recs appreciated, manage medically  -2/15 Spoke with Dr. Sam, agrees with concern for metastasis. Ordered bone scan. Dr. Sam will check MRI from 1 month ago for evidence of metastasis.     -UA: (+)trace LE, (+)hematuria   -No symptoms of UTI  -Urince cx <10k  -DC rocephin    #Sclerotic bone lesion  -scattered sclerotic bone lesion: Hx of prostate cancer, s/p outpt MRI by Dr. Sam. Was advised to have outpt bone scan. Will consult Dr. Sam. Pain management for now and fall risk precaution   -2 cm right renal lesion: outpt f/u     #LE Venous Stasis Dermatitis   -no evidence of cellulitis  -wound care consult     #Anemia   -serial H&H   -occult blood negative  -Iron studies pending    #Hyponatremia   -133>134  -trend BMP/CMP     #Hx of Afib/Aflutter s/p cardioversion   -currently in NSR   -c/w Eliquis 5mg BID   -c/w Dofetilide 250mg BID     #HLD   -c/w Atorvastatin 10mg     #Glaucoma   -c/w Timolol     #DVT prophylaxis   -on Eliquis     #DIET   -Regular    #Advanced Directive   -FULL CODE     Case d/w Dr. Jiang   
HPI:  88 y/o M with PMHx of Afib/Aflutter s/p cardioversion, HLD, Glaucoma, Hx of prostate cancer presented to ED via EMS for back pain. Patient is poor historian, currenlty complains of back pain and cannot provide further history. Hx obtained from wife over the phone. According to wife, patient slipped and fell in the bathroom about 2-3 months ago and since have been complaining of back pain which has been progressively getting worse. Patient was able to ambulate with cane and then walker since the fall, but for past 5-7 days have been unable to ambulate due to pain. Patient was evaluated by Dr. Sam (ortho spine) and had an o/p MRI (approx 2 weeks) which revealed no acute fx (per wife) but was advised to get bone scan for further eval. Patient was also recently prescribed Tramadol for back pain and since then his mentation has been waxing and waning. Wife reports that patient overall has been more forgetful. Patient has Hx of recurrent LE "cellulitis" and has been evaluated by visiting nurse and has been getting home PT.     IMP:   Multiple sclerotic bony metastases   Hx Prostate CA  DDD LS spine  Lumbar spinal stenosis  Dementia    PLAN:  Patient admitted to Medicine  Analgesia sparingly due to change in MS  Medical management  No impending spinal fracutres  Hem/Onc assessment and recommendation noted.

## 2022-02-18 NOTE — PROGRESS NOTE ADULT - TIME BILLING
Case discussed with nursing, Dr. Sam, Hospitalist
Case discussed with nursing, Dr. Sam
Case discussed with nursing, Dr. Sam, Medical Oncologist

## 2022-02-18 NOTE — DISCHARGE NOTE NURSING/CASE MANAGEMENT/SOCIAL WORK - NSDCPEFALRISK_GEN_ALL_CORE
For information on Fall & Injury Prevention, visit: [email or web address] OR  [email or web address] OR  https://www.cdc.gov/steadi/patient.html

## 2022-02-18 NOTE — PROGRESS NOTE ADULT - NUTRITIONAL ASSESSMENT
This patient has been assessed with a concern for Malnutrition and has been determined to have a diagnosis/diagnoses of Severe protein-calorie malnutrition.    This patient is being managed with:   Diet Regular-  Entered: Feb 15 2022  4:07PM    

## 2022-02-18 NOTE — DISCHARGE NOTE NURSING/CASE MANAGEMENT/SOCIAL WORK - PATIENT PORTAL LINK FT
You can access the FollowMyHealth Patient Portal offered by [facility] by registering at the following website: [email or web address]. By joining [facility]’s FollowMyHealth portal, you will also be able to view your health information using other applications (apps) compatible with our system.

## 2022-02-18 NOTE — PROGRESS NOTE ADULT - SUBJECTIVE AND OBJECTIVE BOX
HPI: I tried to contact wife but no answer this AM.    88 y/o M with PMHx of Afib/Aflutter s/p cardioversion, HLD, Glaucoma, Hx of prostate cancer presented to ED via EMS for back pain. Patient is poor historian, currenlty complains of back pain and cannot provide further history. Hx obtained from wife over the phone. According to wife, patient slipped and fell in the bathroom about 2-3 months ago and since have been complaining of back pain which has been progressively getting worse. Patient was able to ambulate with cane and then walker since the fall, but for past 5-7 days have been unable to ambulate due to pain. Patient was evaluated by Dr. Sam (ortho spine) and had an o/p MRI (approx 2 weeks) which revealed no acute fx (per wife) but was advised to get bone scan for further eval. Patient was also recently prescribed Tramadol for back pain and since then his mentation has been waxing and waning. Wife reports that patient overall has been more forgetful. Patient has Hx of recurrent LE "cellulitis" and has been evaluated by visiting nurse and has been getting home PT.     2/16/22 Patient denies back pain at present  2/17/22 Patient denies pain today  2/18/22 Patient awake, denies any back pain    PAST MEDICAL & SURGICAL HISTORY:  History of appendectomy    History of prostate cancer    Afib/A flutter    HLD    Galucoma    MEDICATIONS  (STANDING):  apixaban 5 milliGRAM(s) Oral two times a day  atorvastatin 10 milliGRAM(s) Oral at bedtime  ciprofloxacin     Tablet 500 milliGRAM(s) Oral every 12 hours  dofetilide 250 MICROGram(s) Oral two times a day  ferrous    sulfate 325 milliGRAM(s) Oral daily  lidocaine   4% Patch 1 Patch Transdermal every 24 hours  metroNIDAZOLE    Tablet 500 milliGRAM(s) Oral every 8 hours  polyethylene glycol 3350 17 Gram(s) Oral daily  QUEtiapine 12.5 milliGRAM(s) Oral at bedtime  senna 2 Tablet(s) Oral at bedtime  sodium chloride 0.9% Bolus 500 milliLiter(s) IV Bolus once  timolol 0.25% Solution 1 Drop(s) Both EYES daily    MEDICATIONS  (PRN):  acetaminophen     Tablet .. 650 milliGRAM(s) Oral every 6 hours PRN Temp greater or equal to 38C (100.4F), Mild Pain (1 - 3)  ALBUTerol    90 MICROgram(s) HFA Inhaler 1 Puff(s) Inhalation every 6 hours PRN Bronchospasm  aluminum hydroxide/magnesium hydroxide/simethicone Suspension 30 milliLiter(s) Oral every 4 hours PRN Dyspepsia  melatonin 3 milliGRAM(s) Oral at bedtime PRN Insomnia  ondansetron Injectable 4 milliGRAM(s) IV Push every 8 hours PRN Nausea and/or Vomiting    Allergies    No Known Allergies    Intolerances    SH: , retired, no EtOH or tob  FH: unable to obtain due to Dementia    ROS c/w HPI otherwise unable to obtain.    PE:    Vital Signs Last 24 Hrs  T(C): 36.8 (18 Feb 2022 07:57), Max: 37.6 (17 Feb 2022 22:50)  T(F): 98.3 (18 Feb 2022 07:57), Max: 99.7 (17 Feb 2022 22:50)  HR: 75 (18 Feb 2022 07:57) (75 - 92)  BP: 130/63 (18 Feb 2022 07:57) (130/63 - 137/60)  BP(mean): 79 (17 Feb 2022 22:50) (79 - 79)  RR: 18 (18 Feb 2022 07:57) (17 - 18)  SpO2: 97% (18 Feb 2022 07:57) (96% - 100%)    Patient sitting in bedside chairl-no pain complaints  T-L spine-no tenderness, no pain with SLR bilaterally, no apparent motor deficits  Neuro-awake and alert, asking about going home  (+) cellulitis LEs with open areas L anterior shin    LABS                      12.2   14.77 )-----------( 219      ( 14 Feb 2022 15:21 )             38.4   02-14    133<L>  |  97  |  22  ----------------------------<  97  4.4   |  31  |  0.98    Ca    9.1      14 Feb 2022 15:21    TPro  7.8  /  Alb  3.0<L>  /  TBili  0.9  /  DBili  x   /  AST  27  /  ALT  14  /  AlkPhos  85  02-14    Urine Microscopic-Add On (NC) (02.14.22 @ 15:21)    Granular Cast: 6-10 /LPF    Comment - Urine: Occasional mucous strands    Bacteria: Moderate    Epithelial Cells: Negative    Red Blood Cell - Urine: 3-5 /HPF    White Blood Cell - Urine: 0-2    STUDIES  Bone scan   There are numerous foci of increased tracer uptake in multiple   bilateral ribs, sternum, bilateral pelvis, thoracic/lumbar/sacral spine,   bilateral scapulae, and bilateral proximal femurs. These findings likely   correspond to sclerotic lesions on diagnostic CT. There is physiologic   tracer distribution in the remainder of the visualized skeleton.    O/P MRI LS spine  DDD LS spine, moderate stenosis L3/4, multiple sclerotic lesions in vertebral bodies and pelvis. No acute fracture noted.    CT scan C/A/P  KIDNEYS/URETERS: A few cysts. Indeterminate 2 cm right renal lesion   series 2 image 103.  BOWEL: No bowel obstruction. Outpouching is noted from small bowel in the   left abdomen on series 2 image 100 with surrounding inflammation.  BONES: Multiple scattered sclerotic lesions. including vertebral bodies and pelvis  
88 y/o M with PMHx of Afib/Aflutter s/p cardioversion, HLD, Glaucoma, Hx of prostate cancer presented to ED via EMS for back pain. Patient is poor historian, currenlty complains of back pain and cannot provide further history. Hx obtained from wife over the phone. According to wife, patient slipped and fell in the bathroom about 2-3 months ago and since have been complaining of back pain which has been progressively getting worse. Patient was able to ambulate with cane and then walker since the fall, but for past 5-7 days have been unable to ambulate due to pain. Patient was evaluated by Dr. Sam (ortho spine) and had an o/p MRI (approx 2 weeks) which revealed no acute fx (per wife) but was advised to get bone scan for further eval. Patient was also recently prescribed Tramadol for back pain and since then his mentation has been waxing and waning. Wife reports that patient overall has been more forgetful. At baseline, pt is oriented to self, time and place. Patient has Hx of recurrent LE "cellulitis" and has been evaluated by visiting nurse and has been getting home PT.   In ED, pt was noted to be febrile, tmax: 100.5, HR: 78, BP: 142/77. CT chest/ abdomen-pelvis with IV contrast showed 2cm right renal lesion, acute small bowel diverticulitis and scattered sclerotic bone lesions. Patient received 1 dose of Cefepime and Metronidazole.     2/18: Continued waxing and waning mental status but improved compared to admission. Patient has no complaints at this time.     REVIEW OF SYSTEMS:  CONSTITUTIONAL: (-) weakness, (-) fevers, (-) chills  EYES/ENT: (-) visual changes,  (-) vertigo,  (-) throat pain   NECK:  (-) pain, (-) stiffness  RESPIRATORY:  (-) shortness of breath, (-) cough,  (-) wheezing,  (-) hemoptysis   CARDIOVASCULAR:  (-) chest pain, (-) palpitations  GASTROINTESTINAL:  (-) abdominal or epigastric pain, (-) nausea, (-) vomiting, (-) diarrhea, (-) constipation, (-) melena,  (-) hematemesis,  (-) hematochezia  GENITOURINARY: (-) dysuria, (-) frequency, (-) hematuria  NEUROLOGICAL: (-) numbness, (-) weakness  SKIN: (-) itching, (-) rashes, (-) lesions    PHYSICAL EXAM:  Vital Signs Last 24 Hrs  T(C): 37.3 (17 Feb 2022 14:39), Max: 37.3 (17 Feb 2022 07:54)  T(F): 99.1 (17 Feb 2022 14:39), Max: 99.2 (17 Feb 2022 07:54)  HR: 92 (17 Feb 2022 14:39) (77 - 92)  BP: 132/61 (17 Feb 2022 14:39) (125/59 - 139/71)  RR: 17 (17 Feb 2022 14:39) (17 - 20)  SpO2: 100% (17 Feb 2022 14:39) (95% - 100%)    PHYSICAL EXAM:  GENERAL: NAD, lying in bed comfortably  HEAD:  Atraumatic, Normocephalic  EYES: EOMI, PERRLA, conjunctiva and sclera clear  ENT: Moist mucous membranes  NECK: Supple, No JVD  CHEST/LUNG: Clear to auscultation bilaterally, good air entry bilaterally; No wheezing, rales, or rhonchi. Unlabored respirations  HEART: Regular rate and rhythm. S1 and S2. No murmurs, rubs, or gallops  ABDOMEN: Soft, Nontender, Nondistended. Bowel sounds present x4 quadrants; No hepatomegaly. No splenomegaly.  EXTREMITIES:  2+ Peripheral Pulses. Capillary refill <2 seconds. No clubbing, cyanosis, or edema  NERVOUS SYSTEM:  Alert & Oriented X3, speech clear. No deficits   MSK: FROM all 4 extremities, full and equal strength  SKIN: No rashes, bruises, or other lesions    MEDICATIONS:  MEDICATIONS  (STANDING):  apixaban 5 milliGRAM(s) Oral two times a day  atorvastatin 10 milliGRAM(s) Oral at bedtime  ciprofloxacin     Tablet 500 milliGRAM(s) Oral every 12 hours  dofetilide 250 MICROGram(s) Oral two times a day  ferrous    sulfate 325 milliGRAM(s) Oral daily  lidocaine   4% Patch 1 Patch Transdermal every 24 hours  metroNIDAZOLE    Tablet 500 milliGRAM(s) Oral every 8 hours  polyethylene glycol 3350 17 Gram(s) Oral daily  QUEtiapine 12.5 milliGRAM(s) Oral at bedtime  senna 2 Tablet(s) Oral at bedtime  sodium chloride 0.9% Bolus 500 milliLiter(s) IV Bolus once  timolol 0.25% Solution 1 Drop(s) Both EYES daily      LABS: All Labs Reviewed:                        11.5   9.92  )-----------( 239      ( 16 Feb 2022 07:54 )             36.0     02-16    138  |  102  |  17  ----------------------------<  99  3.8   |  31  |  0.74    Ca    8.7      16 Feb 2022 07:54    TPro  7.5  /  Alb  2.8<L>  /  TBili  0.8  /  DBili  x   /  AST  33  /  ALT  14  /  AlkPhos  84  02-15          Blood Culture: 02-14 @ 15:21  Organism --  Gram Stain Blood -- Gram Stain --  Specimen Source .Blood None  Culture-Blood --      I&O's Summary    CAPILLARY BLOOD GLUCOSE          RADIOLOGY/EKG:    < from: CT Chest w/ IV Cont (02.14.22 @ 17:19) >  FINDINGS:  CHEST:  LUNGS AND LARGE AIRWAYS: Patent central airways. No pulmonary nodules.  PLEURA: No pleural effusion.  VESSELS: Within normal limits.  HEART: Heart size is normal.  No pericardial effusion.  MEDIASTINUM AND RAFAEL: No lymphadenopathy.  CHEST WALL AND LOWER NECK: Within normal limits.    ABDOMEN AND PELVIS:  LIVER: Within normal limits.  BILE DUCTS: Normal caliber.  GALLBLADDER: Within normal limits.  SPLEEN: Within normal limits.  PANCREAS: Within normal limits.  ADRENALS: Within normal limits.  KIDNEYS/URETERS: A few cysts. Indeterminate 2 cm right renal lesion   series 2 image 103.    BLADDER: Within normal limits.  REPRODUCTIVE ORGANS: Within normal limits.    BOWEL: No bowel obstruction. Outpouching is noted from small bowel in the   left abdomen on series 2 image 100 with surrounding inflammation.  PERITONEUM: No ascites.  VESSELS:  Within normal limits.  RETROPERITONEUM/LYMPH NODES: No lymphadenopathy.  ABDOMINAL WALL: Within normal limits.  BONES: Multiple scattered sclerotic lesions.    IMPRESSION: Suspicious for acute small bowel diverticulitis.    Scattered sclerotic osseous lesions are indeterminate.    < end of copied text >  < from: CT Head No Cont (02.15.22 @ 00:46) >      IMPRESSION:    No skull fracture, intracranial hemorrhage or mass effect.    < end of copied text >  < from: NM Bone Imaging Total (02.16.22 @ 11:20) >  FINDINGS: There are numerous foci of increased tracer uptake in multiple   bilateral ribs, sternum, bilateral pelvis, thoracic/lumbar/sacral spine,   bilateral scapulae, and bilateral proximal femurs. These findings likely   correspond to sclerotic lesions on diagnostic CT. There is physiologic   tracer distribution in the remainder of the visualized skeleton.    Both kidneys are visualized.    IMPRESSION: Abnormal bone scan    Findings compatible with multifocal osseous metastases.    Degenerative disease in the major joints.        
88 y/o M with PMHx of Afib/Aflutter s/p cardioversion, HLD, Glaucoma, Hx of prostate cancer presented to ED via EMS for back pain. Patient is poor historian, currenlty complains of back pain and cannot provide further history. Hx obtained from wife over the phone. According to wife, patient slipped and fell in the bathroom about 2-3 months ago and since have been complaining of back pain which has been progressively getting worse. Patient was able to ambulate with cane and then walker since the fall, but for past 5-7 days have been unable to ambulate due to pain. Patient was evaluated by Dr. Sam (ortho spine) and had an o/p MRI (approx 2 weeks) which revealed no acute fx (per wife) but was advised to get bone scan for further eval. Patient was also recently prescribed Tramadol for back pain and since then his mentation has been waxing and waning. Wife reports that patient overall has been more forgetful. At baseline, pt is oriented to self, time and place. Patient has Hx of recurrent LE "cellulitis" and has been evaluated by visiting nurse and has been getting home PT.   In ED, pt was noted to be febrile, tmax: 100.5, HR: 78, BP: 142/77. CT chest/ abdomen-pelvis with IV contrast showed 2cm right renal lesion, acute small bowel diverticulitis and scattered sclerotic bone lesions. Patient received 1 dose of Cefepime and Metronidazole.     2/16: Evaluated patient in the morning. In the AM he was AAOx4. Went for bone scan, received Xanax for agitation. Reevaluated in PM, AAOX1 confused, no agitation.     REVIEW OF SYSTEMS:  CONSTITUTIONAL: (-) weakness, (-) fevers, (-) chills  EYES/ENT: (-) visual changes,  (-) vertigo,  (-) throat pain   NECK:  (-) pain, (-) stiffness  RESPIRATORY:  (-) shortness of breath, (-) cough,  (-) wheezing,  (-) hemoptysis   CARDIOVASCULAR:  (-) chest pain, (-) palpitations  GASTROINTESTINAL:  (-) abdominal or epigastric pain, (-) nausea, (-) vomiting, (-) diarrhea, (-) constipation, (-) melena,  (-) hematemesis,  (-) hematochezia  GENITOURINARY: (-) dysuria, (-) frequency, (-) hematuria  NEUROLOGICAL: (-) numbness, (-) weakness  SKIN: (-) itching, (-) rashes, (-) lesions    PHYSICAL EXAM:  Vital Signs Last 24 Hrs  T(C): 36.9 (16 Feb 2022 07:08), Max: 37.2 (16 Feb 2022 00:05)  T(F): 98.5 (16 Feb 2022 07:08), Max: 99 (16 Feb 2022 00:05)  HR: 73 (16 Feb 2022 07:08) (73 - 86)  BP: 120/84 (16 Feb 2022 07:08) (120/84 - 134/92)  RR: 18 (16 Feb 2022 07:08) (18 - 18)  SpO2: 96% (16 Feb 2022 07:08) (96% - 96%)    PHYSICAL EXAM:  GENERAL: NAD, lying in bed comfortably  HEAD:  Atraumatic, Normocephalic  EYES: EOMI, PERRLA, conjunctiva and sclera clear  ENT: Moist mucous membranes  NECK: Supple, No JVD  CHEST/LUNG: Clear to auscultation bilaterally, good air entry bilaterally; No wheezing, rales, or rhonchi. Unlabored respirations  HEART: Regular rate and rhythm. S1 and S2. No murmurs, rubs, or gallops  ABDOMEN: Soft, Nontender, Nondistended. Bowel sounds present x4 quadrants; No hepatomegaly. No splenomegaly.  EXTREMITIES:  2+ Peripheral Pulses. Capillary refill <2 seconds. No clubbing, cyanosis, or edema  NERVOUS SYSTEM:  Alert & Oriented X3, speech clear. No deficits   MSK: FROM all 4 extremities, full and equal strength  SKIN: No rashes, bruises, or other lesions    MEDICATIONS:  MEDICATIONS  (STANDING):  apixaban 5 milliGRAM(s) Oral two times a day  atorvastatin 10 milliGRAM(s) Oral at bedtime  ciprofloxacin     Tablet 500 milliGRAM(s) Oral every 12 hours  dofetilide 250 MICROGram(s) Oral two times a day  ferrous    sulfate 325 milliGRAM(s) Oral daily  lidocaine   4% Patch 1 Patch Transdermal every 24 hours  metroNIDAZOLE    Tablet 500 milliGRAM(s) Oral every 8 hours  polyethylene glycol 3350 17 Gram(s) Oral daily  QUEtiapine 12.5 milliGRAM(s) Oral at bedtime  senna 2 Tablet(s) Oral at bedtime  sodium chloride 0.9% Bolus 500 milliLiter(s) IV Bolus once  timolol 0.25% Solution 1 Drop(s) Both EYES daily      LABS: All Labs Reviewed:                        11.5   9.92  )-----------( 239      ( 16 Feb 2022 07:54 )             36.0     02-16    138  |  102  |  17  ----------------------------<  99  3.8   |  31  |  0.74    Ca    8.7      16 Feb 2022 07:54    TPro  7.5  /  Alb  2.8<L>  /  TBili  0.8  /  DBili  x   /  AST  33  /  ALT  14  /  AlkPhos  84  02-15          Blood Culture: 02-14 @ 15:21  Organism --  Gram Stain Blood -- Gram Stain --  Specimen Source .Blood None  Culture-Blood --      I&O's Summary    CAPILLARY BLOOD GLUCOSE          RADIOLOGY/EKG:    < from: CT Chest w/ IV Cont (02.14.22 @ 17:19) >  FINDINGS:  CHEST:  LUNGS AND LARGE AIRWAYS: Patent central airways. No pulmonary nodules.  PLEURA: No pleural effusion.  VESSELS: Within normal limits.  HEART: Heart size is normal.  No pericardial effusion.  MEDIASTINUM AND RAFAEL: No lymphadenopathy.  CHEST WALL AND LOWER NECK: Within normal limits.    ABDOMEN AND PELVIS:  LIVER: Within normal limits.  BILE DUCTS: Normal caliber.  GALLBLADDER: Within normal limits.  SPLEEN: Within normal limits.  PANCREAS: Within normal limits.  ADRENALS: Within normal limits.  KIDNEYS/URETERS: A few cysts. Indeterminate 2 cm right renal lesion   series 2 image 103.    BLADDER: Within normal limits.  REPRODUCTIVE ORGANS: Within normal limits.    BOWEL: No bowel obstruction. Outpouching is noted from small bowel in the   left abdomen on series 2 image 100 with surrounding inflammation.  PERITONEUM: No ascites.  VESSELS:  Within normal limits.  RETROPERITONEUM/LYMPH NODES: No lymphadenopathy.  ABDOMINAL WALL: Within normal limits.  BONES: Multiple scattered sclerotic lesions.    IMPRESSION: Suspicious for acute small bowel diverticulitis.    Scattered sclerotic osseous lesions are indeterminate.    < end of copied text >  < from: CT Head No Cont (02.15.22 @ 00:46) >      IMPRESSION:    No skull fracture, intracranial hemorrhage or mass effect.    < end of copied text >  < from: NM Bone Imaging Total (02.16.22 @ 11:20) >  FINDINGS: There are numerous foci of increased tracer uptake in multiple   bilateral ribs, sternum, bilateral pelvis, thoracic/lumbar/sacral spine,   bilateral scapulae, and bilateral proximal femurs. These findings likely   correspond to sclerotic lesions on diagnostic CT. There is physiologic   tracer distribution in the remainder of the visualized skeleton.    Both kidneys are visualized.    IMPRESSION: Abnormal bone scan    Findings compatible with multifocal osseous metastases.    Degenerative disease in the major joints.    < end of copied text >    
HPI: I tried to contact wife but no answer this AM.    86 y/o M with PMHx of Afib/Aflutter s/p cardioversion, HLD, Glaucoma, Hx of prostate cancer presented to ED via EMS for back pain. Patient is poor historian, currenlty complains of back pain and cannot provide further history. Hx obtained from wife over the phone. According to wife, patient slipped and fell in the bathroom about 2-3 months ago and since have been complaining of back pain which has been progressively getting worse. Patient was able to ambulate with cane and then walker since the fall, but for past 5-7 days have been unable to ambulate due to pain. Patient was evaluated by Dr. Sam (ortho spine) and had an o/p MRI (approx 2 weeks) which revealed no acute fx (per wife) but was advised to get bone scan for further eval. Patient was also recently prescribed Tramadol for back pain and since then his mentation has been waxing and waning. Wife reports that patient overall has been more forgetful. Patient has Hx of recurrent LE "cellulitis" and has been evaluated by visiting nurse and has been getting home PT.     2/16/22 Patient denies back pain at present  2/17/22 Patient denies pain today    PAST MEDICAL & SURGICAL HISTORY:  History of appendectomy    History of prostate cancer    Afib/A flutter    HLD    Galucoma    MEDICATIONS  (STANDING):  apixaban 5 milliGRAM(s) Oral two times a day  atorvastatin 10 milliGRAM(s) Oral at bedtime  ciprofloxacin     Tablet 500 milliGRAM(s) Oral every 12 hours  dofetilide 250 MICROGram(s) Oral two times a day  ferrous    sulfate 325 milliGRAM(s) Oral daily  lidocaine   4% Patch 1 Patch Transdermal every 24 hours  metroNIDAZOLE    Tablet 500 milliGRAM(s) Oral every 8 hours  polyethylene glycol 3350 17 Gram(s) Oral daily  QUEtiapine 12.5 milliGRAM(s) Oral at bedtime  senna 2 Tablet(s) Oral at bedtime  sodium chloride 0.9% Bolus 500 milliLiter(s) IV Bolus once  timolol 0.25% Solution 1 Drop(s) Both EYES daily    MEDICATIONS  (PRN):  acetaminophen     Tablet .. 650 milliGRAM(s) Oral every 6 hours PRN Temp greater or equal to 38C (100.4F), Mild Pain (1 - 3)  ALBUTerol    90 MICROgram(s) HFA Inhaler 1 Puff(s) Inhalation every 6 hours PRN Bronchospasm  aluminum hydroxide/magnesium hydroxide/simethicone Suspension 30 milliLiter(s) Oral every 4 hours PRN Dyspepsia  melatonin 3 milliGRAM(s) Oral at bedtime PRN Insomnia  ondansetron Injectable 4 milliGRAM(s) IV Push every 8 hours PRN Nausea and/or Vomiting    Allergies    No Known Allergies    Intolerances    SH: , retired, no EtOH or tob  FH: unable to obtain due to Dementia    ROS c/w HPI otherwise unable to obtain.    PE:    Vital Signs Last 24 Hrs  T(C): 37.3 (17 Feb 2022 07:54), Max: 37.7 (16 Feb 2022 16:01)  T(F): 99.2 (17 Feb 2022 07:54), Max: 99.8 (16 Feb 2022 16:01)  HR: 77 (17 Feb 2022 07:54) (77 - 78)  BP: 139/71 (17 Feb 2022 07:54) (103/67 - 139/71)  BP(mean): --  RR: 20 (17 Feb 2022 07:54) (18 - 20)  SpO2: 95% (17 Feb 2022 07:54) (95% - 95%)    Patient up and ambulating with PT and walker upon arrival-no pain complaints  T-L spine-no tenderness, no pain with SLR bilaterally, no apparent motor deficits  Neuro-awake and alert, oriented X2  (+) cellulitis LEs with open areas L anterior shin    LABS                      12.2   14.77 )-----------( 219      ( 14 Feb 2022 15:21 )             38.4   02-14    133<L>  |  97  |  22  ----------------------------<  97  4.4   |  31  |  0.98    Ca    9.1      14 Feb 2022 15:21    TPro  7.8  /  Alb  3.0<L>  /  TBili  0.9  /  DBili  x   /  AST  27  /  ALT  14  /  AlkPhos  85  02-14    Urine Microscopic-Add On (NC) (02.14.22 @ 15:21)    Granular Cast: 6-10 /LPF    Comment - Urine: Occasional mucous strands    Bacteria: Moderate    Epithelial Cells: Negative    Red Blood Cell - Urine: 3-5 /HPF    White Blood Cell - Urine: 0-2    STUDIES  Bone scan   There are numerous foci of increased tracer uptake in multiple   bilateral ribs, sternum, bilateral pelvis, thoracic/lumbar/sacral spine,   bilateral scapulae, and bilateral proximal femurs. These findings likely   correspond to sclerotic lesions on diagnostic CT. There is physiologic   tracer distribution in the remainder of the visualized skeleton.    O/P MRI LS spine  DDD LS spine, moderate stenosis L3/4, multiple sclerotic lesions in vertebral bodies and pelvis. No acute fracture noted.    CT scan C/A/P  KIDNEYS/URETERS: A few cysts. Indeterminate 2 cm right renal lesion   series 2 image 103.  BOWEL: No bowel obstruction. Outpouching is noted from small bowel in the   left abdomen on series 2 image 100 with surrounding inflammation.  BONES: Multiple scattered sclerotic lesions. including vertebral bodies and pelvis  
86 y/o M with PMHx of Afib/Aflutter s/p cardioversion, HLD, Glaucoma, Hx of prostate cancer presented to ED via EMS for back pain. Patient is poor historian, currenlty complains of back pain and cannot provide further history. Hx obtained from wife over the phone. According to wife, patient slipped and fell in the bathroom about 2-3 months ago and since have been complaining of back pain which has been progressively getting worse. Patient was able to ambulate with cane and then walker since the fall, but for past 5-7 days have been unable to ambulate due to pain. Patient was evaluated by Dr. Sam (ortho spine) and had an o/p MRI (approx 2 weeks) which revealed no acute fx (per wife) but was advised to get bone scan for further eval. Patient was also recently prescribed Tramadol for back pain and since then his mentation has been waxing and waning. Wife reports that patient overall has been more forgetful. At baseline, pt is oriented to self, time and place. Patient has Hx of recurrent LE "cellulitis" and has been evaluated by visiting nurse and has been getting home PT.   In ED, pt was noted to be febrile, tmax: 100.5, HR: 78, BP: 142/77. CT chest/ abdomen-pelvis with IV contrast showed 2cm right renal lesion, acute small bowel diverticulitis and scattered sclerotic bone lesions. Patient received 1 dose of Cefepime and Metronidazole.     2/17: Continued waxing and waning mental status but improved compared to admission. Patient has no complaints at this time.     REVIEW OF SYSTEMS:  CONSTITUTIONAL: (-) weakness, (-) fevers, (-) chills  EYES/ENT: (-) visual changes,  (-) vertigo,  (-) throat pain   NECK:  (-) pain, (-) stiffness  RESPIRATORY:  (-) shortness of breath, (-) cough,  (-) wheezing,  (-) hemoptysis   CARDIOVASCULAR:  (-) chest pain, (-) palpitations  GASTROINTESTINAL:  (-) abdominal or epigastric pain, (-) nausea, (-) vomiting, (-) diarrhea, (-) constipation, (-) melena,  (-) hematemesis,  (-) hematochezia  GENITOURINARY: (-) dysuria, (-) frequency, (-) hematuria  NEUROLOGICAL: (-) numbness, (-) weakness  SKIN: (-) itching, (-) rashes, (-) lesions    PHYSICAL EXAM:  Vital Signs Last 24 Hrs  T(C): 37.3 (17 Feb 2022 14:39), Max: 37.3 (17 Feb 2022 07:54)  T(F): 99.1 (17 Feb 2022 14:39), Max: 99.2 (17 Feb 2022 07:54)  HR: 92 (17 Feb 2022 14:39) (77 - 92)  BP: 132/61 (17 Feb 2022 14:39) (125/59 - 139/71)  BP(mean): --  RR: 17 (17 Feb 2022 14:39) (17 - 20)  SpO2: 100% (17 Feb 2022 14:39) (95% - 100%)    PHYSICAL EXAM:  GENERAL: NAD, lying in bed comfortably  HEAD:  Atraumatic, Normocephalic  EYES: EOMI, PERRLA, conjunctiva and sclera clear  ENT: Moist mucous membranes  NECK: Supple, No JVD  CHEST/LUNG: Clear to auscultation bilaterally, good air entry bilaterally; No wheezing, rales, or rhonchi. Unlabored respirations  HEART: Regular rate and rhythm. S1 and S2. No murmurs, rubs, or gallops  ABDOMEN: Soft, Nontender, Nondistended. Bowel sounds present x4 quadrants; No hepatomegaly. No splenomegaly.  EXTREMITIES:  2+ Peripheral Pulses. Capillary refill <2 seconds. No clubbing, cyanosis, or edema  NERVOUS SYSTEM:  Alert & Oriented X3, speech clear. No deficits   MSK: FROM all 4 extremities, full and equal strength  SKIN: No rashes, bruises, or other lesions    MEDICATIONS:  MEDICATIONS  (STANDING):  apixaban 5 milliGRAM(s) Oral two times a day  atorvastatin 10 milliGRAM(s) Oral at bedtime  ciprofloxacin     Tablet 500 milliGRAM(s) Oral every 12 hours  dofetilide 250 MICROGram(s) Oral two times a day  ferrous    sulfate 325 milliGRAM(s) Oral daily  lidocaine   4% Patch 1 Patch Transdermal every 24 hours  metroNIDAZOLE    Tablet 500 milliGRAM(s) Oral every 8 hours  polyethylene glycol 3350 17 Gram(s) Oral daily  QUEtiapine 12.5 milliGRAM(s) Oral at bedtime  senna 2 Tablet(s) Oral at bedtime  sodium chloride 0.9% Bolus 500 milliLiter(s) IV Bolus once  timolol 0.25% Solution 1 Drop(s) Both EYES daily      LABS: All Labs Reviewed:                        11.5   9.92  )-----------( 239      ( 16 Feb 2022 07:54 )             36.0     02-16    138  |  102  |  17  ----------------------------<  99  3.8   |  31  |  0.74    Ca    8.7      16 Feb 2022 07:54    TPro  7.5  /  Alb  2.8<L>  /  TBili  0.8  /  DBili  x   /  AST  33  /  ALT  14  /  AlkPhos  84  02-15          Blood Culture: 02-14 @ 15:21  Organism --  Gram Stain Blood -- Gram Stain --  Specimen Source .Blood None  Culture-Blood --      I&O's Summary    CAPILLARY BLOOD GLUCOSE          RADIOLOGY/EKG:    < from: CT Chest w/ IV Cont (02.14.22 @ 17:19) >  FINDINGS:  CHEST:  LUNGS AND LARGE AIRWAYS: Patent central airways. No pulmonary nodules.  PLEURA: No pleural effusion.  VESSELS: Within normal limits.  HEART: Heart size is normal.  No pericardial effusion.  MEDIASTINUM AND RAFAEL: No lymphadenopathy.  CHEST WALL AND LOWER NECK: Within normal limits.    ABDOMEN AND PELVIS:  LIVER: Within normal limits.  BILE DUCTS: Normal caliber.  GALLBLADDER: Within normal limits.  SPLEEN: Within normal limits.  PANCREAS: Within normal limits.  ADRENALS: Within normal limits.  KIDNEYS/URETERS: A few cysts. Indeterminate 2 cm right renal lesion   series 2 image 103.    BLADDER: Within normal limits.  REPRODUCTIVE ORGANS: Within normal limits.    BOWEL: No bowel obstruction. Outpouching is noted from small bowel in the   left abdomen on series 2 image 100 with surrounding inflammation.  PERITONEUM: No ascites.  VESSELS:  Within normal limits.  RETROPERITONEUM/LYMPH NODES: No lymphadenopathy.  ABDOMINAL WALL: Within normal limits.  BONES: Multiple scattered sclerotic lesions.    IMPRESSION: Suspicious for acute small bowel diverticulitis.    Scattered sclerotic osseous lesions are indeterminate.    < end of copied text >  < from: CT Head No Cont (02.15.22 @ 00:46) >      IMPRESSION:    No skull fracture, intracranial hemorrhage or mass effect.    < end of copied text >  < from: NM Bone Imaging Total (02.16.22 @ 11:20) >  FINDINGS: There are numerous foci of increased tracer uptake in multiple   bilateral ribs, sternum, bilateral pelvis, thoracic/lumbar/sacral spine,   bilateral scapulae, and bilateral proximal femurs. These findings likely   correspond to sclerotic lesions on diagnostic CT. There is physiologic   tracer distribution in the remainder of the visualized skeleton.    Both kidneys are visualized.    IMPRESSION: Abnormal bone scan    Findings compatible with multifocal osseous metastases.    Degenerative disease in the major joints.    < end of copied text >    
88 y/o M with PMHx of Afib/Aflutter s/p cardioversion, HLD, Glaucoma, Hx of prostate cancer presented to ED via EMS for back pain. Patient is poor historian, currenlty complains of back pain and cannot provide further history. Hx obtained from wife over the phone. According to wife, patient slipped and fell in the bathroom about 2-3 months ago and since have been complaining of back pain which has been progressively getting worse. Patient was able to ambulate with cane and then walker since the fall, but for past 5-7 days have been unable to ambulate due to pain. Patient was evaluated by Dr. Sam (ortho spine) and had an o/p MRI (approx 2 weeks) which revealed no acute fx (per wife) but was advised to get bone scan for further eval. Patient was also recently prescribed Tramadol for back pain and since then his mentation has been waxing and waning. Wife reports that patient overall has been more forgetful. At baseline, pt is oriented to self, time and place. Patient has Hx of recurrent LE "cellulitis" and has been evaluated by visiting nurse and has been getting home PT.   In ED, pt was noted to be febrile, tmax: 100.5, HR: 78, BP: 142/77. CT chest/ abdomen-pelvis with IV contrast showed 2cm right renal lesion, acute small bowel diverticulitis and scattered sclerotic bone lesions. Patient received 1 dose of Cefepime and Metronidazole.               2/15: 
HPI: I tried to contact wife but no answer this AM.    88 y/o M with PMHx of Afib/Aflutter s/p cardioversion, HLD, Glaucoma, Hx of prostate cancer presented to ED via EMS for back pain. Patient is poor historian, currenlty complains of back pain and cannot provide further history. Hx obtained from wife over the phone. According to wife, patient slipped and fell in the bathroom about 2-3 months ago and since have been complaining of back pain which has been progressively getting worse. Patient was able to ambulate with cane and then walker since the fall, but for past 5-7 days have been unable to ambulate due to pain. Patient was evaluated by Dr. Sam (ortho spine) and had an o/p MRI (approx 2 weeks) which revealed no acute fx (per wife) but was advised to get bone scan for further eval. Patient was also recently prescribed Tramadol for back pain and since then his mentation has been waxing and waning. Wife reports that patient overall has been more forgetful. Patient has Hx of recurrent LE "cellulitis" and has been evaluated by visiting nurse and has been getting home PT.     2/16/22 Patient denies back pain at present    PAST MEDICAL & SURGICAL HISTORY:  History of appendectomy    History of prostate cancer    Afib/A flutter    HLD    Galucoma    MEDICATIONS  (STANDING):  apixaban 5 milliGRAM(s) Oral two times a day  atorvastatin 10 milliGRAM(s) Oral at bedtime  ciprofloxacin     Tablet 500 milliGRAM(s) Oral every 12 hours  dofetilide 250 MICROGram(s) Oral two times a day  ferrous    sulfate 325 milliGRAM(s) Oral daily  lidocaine   4% Patch 1 Patch Transdermal every 24 hours  metroNIDAZOLE    Tablet 500 milliGRAM(s) Oral every 8 hours  polyethylene glycol 3350 17 Gram(s) Oral daily  QUEtiapine 12.5 milliGRAM(s) Oral at bedtime  senna 2 Tablet(s) Oral at bedtime  sodium chloride 0.9% Bolus 500 milliLiter(s) IV Bolus once  timolol 0.25% Solution 1 Drop(s) Both EYES daily    MEDICATIONS  (PRN):  acetaminophen     Tablet .. 650 milliGRAM(s) Oral every 6 hours PRN Temp greater or equal to 38C (100.4F), Mild Pain (1 - 3)  ALBUTerol    90 MICROgram(s) HFA Inhaler 1 Puff(s) Inhalation every 6 hours PRN Bronchospasm  aluminum hydroxide/magnesium hydroxide/simethicone Suspension 30 milliLiter(s) Oral every 4 hours PRN Dyspepsia  melatonin 3 milliGRAM(s) Oral at bedtime PRN Insomnia  ondansetron Injectable 4 milliGRAM(s) IV Push every 8 hours PRN Nausea and/or Vomiting    Allergies    No Known Allergies    Intolerances    SH: , retired, no EtOH or tob  FH: unable to obtain due to Dementia    ROS c/w HPI otherwise unable to obtain.    PE:    Vital Signs Last 24 Hrs  T(C): 37.7 (16 Feb 2022 16:01), Max: 37.7 (16 Feb 2022 16:01)  T(F): 99.8 (16 Feb 2022 16:01), Max: 99.8 (16 Feb 2022 16:01)  HR: 77 (16 Feb 2022 16:01) (73 - 86)  BP: 103/67 (16 Feb 2022 16:01) (103/67 - 134/92)  BP(mean): --  RR: 18 (16 Feb 2022 16:01) (18 - 18)  SpO2: 95% (16 Feb 2022 16:01) (95% - 96%)    Patient laying supine in bed-still confused. Offers no pain complaints at this time  T-L spine-patient does not appear to have any tenderness, no pain with SLR bilaterally, no apparent motor deficits LEs as he moves LEs to command but difficulty to assess against resistance  Neuro-awake and alert, oriented to name    LABS                      12.2   14.77 )-----------( 219      ( 14 Feb 2022 15:21 )             38.4   02-14    133<L>  |  97  |  22  ----------------------------<  97  4.4   |  31  |  0.98    Ca    9.1      14 Feb 2022 15:21    TPro  7.8  /  Alb  3.0<L>  /  TBili  0.9  /  DBili  x   /  AST  27  /  ALT  14  /  AlkPhos  85  02-14    Urine Microscopic-Add On (NC) (02.14.22 @ 15:21)    Granular Cast: 6-10 /LPF    Comment - Urine: Occasional mucous strands    Bacteria: Moderate    Epithelial Cells: Negative    Red Blood Cell - Urine: 3-5 /HPF    White Blood Cell - Urine: 0-2    STUDIES  O/P MRI LS spine  DDD LS spine, moderate stenosis L3/4, multiple sclerotic lesions in vertebral bodies and pelvis. No acute fracture noted.    CT scan C/A/P  KIDNEYS/URETERS: A few cysts. Indeterminate 2 cm right renal lesion   series 2 image 103.  BOWEL: No bowel obstruction. Outpouching is noted from small bowel in the   left abdomen on series 2 image 100 with surrounding inflammation.  BONES: Multiple scattered sclerotic lesions. including vertebral bodies and pelvis

## 2022-02-19 LAB
CULTURE RESULTS: SIGNIFICANT CHANGE UP
CULTURE RESULTS: SIGNIFICANT CHANGE UP
SPECIMEN SOURCE: SIGNIFICANT CHANGE UP
SPECIMEN SOURCE: SIGNIFICANT CHANGE UP

## 2022-02-22 DIAGNOSIS — F03.90 UNSPECIFIED DEMENTIA WITHOUT BEHAVIORAL DISTURBANCE: ICD-10-CM

## 2022-02-22 DIAGNOSIS — H40.9 UNSPECIFIED GLAUCOMA: ICD-10-CM

## 2022-02-22 DIAGNOSIS — I48.91 UNSPECIFIED ATRIAL FIBRILLATION: ICD-10-CM

## 2022-02-22 DIAGNOSIS — N39.0 URINARY TRACT INFECTION, SITE NOT SPECIFIED: ICD-10-CM

## 2022-02-22 DIAGNOSIS — D64.9 ANEMIA, UNSPECIFIED: ICD-10-CM

## 2022-02-22 DIAGNOSIS — I10 ESSENTIAL (PRIMARY) HYPERTENSION: ICD-10-CM

## 2022-02-22 DIAGNOSIS — G92.8 OTHER TOXIC ENCEPHALOPATHY: ICD-10-CM

## 2022-02-22 DIAGNOSIS — M40.209 UNSPECIFIED KYPHOSIS, SITE UNSPECIFIED: ICD-10-CM

## 2022-02-22 DIAGNOSIS — C79.51 SECONDARY MALIGNANT NEOPLASM OF BONE: ICD-10-CM

## 2022-02-22 DIAGNOSIS — E87.1 HYPO-OSMOLALITY AND HYPONATREMIA: ICD-10-CM

## 2022-02-22 DIAGNOSIS — N28.9 DISORDER OF KIDNEY AND URETER, UNSPECIFIED: ICD-10-CM

## 2022-02-22 DIAGNOSIS — E43 UNSPECIFIED SEVERE PROTEIN-CALORIE MALNUTRITION: ICD-10-CM

## 2022-02-22 DIAGNOSIS — M51.36 OTHER INTERVERTEBRAL DISC DEGENERATION, LUMBAR REGION: ICD-10-CM

## 2022-02-22 DIAGNOSIS — K57.12 DIVERTICULITIS OF SMALL INTESTINE WITHOUT PERFORATION OR ABSCESS WITHOUT BLEEDING: ICD-10-CM

## 2022-02-22 DIAGNOSIS — Z85.46 PERSONAL HISTORY OF MALIGNANT NEOPLASM OF PROSTATE: ICD-10-CM

## 2022-02-22 DIAGNOSIS — M48.061 SPINAL STENOSIS, LUMBAR REGION WITHOUT NEUROGENIC CLAUDICATION: ICD-10-CM

## 2022-02-22 DIAGNOSIS — E78.5 HYPERLIPIDEMIA, UNSPECIFIED: ICD-10-CM

## 2022-02-22 DIAGNOSIS — I87.2 VENOUS INSUFFICIENCY (CHRONIC) (PERIPHERAL): ICD-10-CM

## 2022-05-12 ENCOUNTER — INPATIENT (INPATIENT)
Facility: HOSPITAL | Age: 87
LOS: 7 days | Discharge: NURSING, REHAB OR LONG-TERM CARE | DRG: 510 | End: 2022-05-20
Attending: INTERNAL MEDICINE | Admitting: INTERNAL MEDICINE
Payer: OTHER GOVERNMENT

## 2022-05-12 VITALS
DIASTOLIC BLOOD PRESSURE: 88 MMHG | SYSTOLIC BLOOD PRESSURE: 164 MMHG | HEIGHT: 70 IN | WEIGHT: 199.96 LBS | HEART RATE: 85 BPM | OXYGEN SATURATION: 96 % | RESPIRATION RATE: 18 BRPM | TEMPERATURE: 99 F

## 2022-05-12 DIAGNOSIS — Z90.49 ACQUIRED ABSENCE OF OTHER SPECIFIED PARTS OF DIGESTIVE TRACT: Chronic | ICD-10-CM

## 2022-05-12 DIAGNOSIS — M00.9 PYOGENIC ARTHRITIS, UNSPECIFIED: ICD-10-CM

## 2022-05-12 DIAGNOSIS — Z85.46 PERSONAL HISTORY OF MALIGNANT NEOPLASM OF PROSTATE: Chronic | ICD-10-CM

## 2022-05-12 LAB
ALBUMIN SERPL ELPH-MCNC: 2.7 G/DL — LOW (ref 3.3–5)
ALP SERPL-CCNC: 190 U/L — HIGH (ref 40–120)
ALT FLD-CCNC: 57 U/L — SIGNIFICANT CHANGE UP (ref 12–78)
ANION GAP SERPL CALC-SCNC: 5 MMOL/L — SIGNIFICANT CHANGE UP (ref 5–17)
APTT BLD: 35.6 SEC — HIGH (ref 27.5–35.5)
AST SERPL-CCNC: 86 U/L — HIGH (ref 15–37)
B PERT IGG+IGM PNL SER: ABNORMAL
BASOPHILS # BLD AUTO: 0.04 K/UL — SIGNIFICANT CHANGE UP (ref 0–0.2)
BASOPHILS NFR BLD AUTO: 0.3 % — SIGNIFICANT CHANGE UP (ref 0–2)
BILIRUB SERPL-MCNC: 0.8 MG/DL — SIGNIFICANT CHANGE UP (ref 0.2–1.2)
BUN SERPL-MCNC: 23 MG/DL — SIGNIFICANT CHANGE UP (ref 7–23)
CALCIUM SERPL-MCNC: 9.3 MG/DL — SIGNIFICANT CHANGE UP (ref 8.5–10.1)
CHLORIDE SERPL-SCNC: 99 MMOL/L — SIGNIFICANT CHANGE UP (ref 96–108)
CO2 SERPL-SCNC: 29 MMOL/L — SIGNIFICANT CHANGE UP (ref 22–31)
COLOR FLD: SIGNIFICANT CHANGE UP
CREAT SERPL-MCNC: 0.8 MG/DL — SIGNIFICANT CHANGE UP (ref 0.5–1.3)
EGFR: 86 ML/MIN/1.73M2 — SIGNIFICANT CHANGE UP
EOSINOPHIL # BLD AUTO: 0.03 K/UL — SIGNIFICANT CHANGE UP (ref 0–0.5)
EOSINOPHIL NFR BLD AUTO: 0.2 % — SIGNIFICANT CHANGE UP (ref 0–6)
ERYTHROCYTE [SEDIMENTATION RATE] IN BLOOD: 91 MM/HR — HIGH (ref 0–20)
FLUID INTAKE SUBSTANCE CLASS: SIGNIFICANT CHANGE UP
GLUCOSE SERPL-MCNC: 107 MG/DL — HIGH (ref 70–99)
HCT VFR BLD CALC: 38.5 % — LOW (ref 39–50)
HGB BLD-MCNC: 12.2 G/DL — LOW (ref 13–17)
IMM GRANULOCYTES NFR BLD AUTO: 0.7 % — SIGNIFICANT CHANGE UP (ref 0–1.5)
INR BLD: 1.81 RATIO — HIGH (ref 0.88–1.16)
LACTATE SERPL-SCNC: 1 MMOL/L — SIGNIFICANT CHANGE UP (ref 0.7–2)
LYMPHOCYTES # BLD AUTO: 1.61 K/UL — SIGNIFICANT CHANGE UP (ref 1–3.3)
LYMPHOCYTES # BLD AUTO: 11.5 % — LOW (ref 13–44)
LYMPHOCYTES # FLD: 2 % — SIGNIFICANT CHANGE UP
MCHC RBC-ENTMCNC: 28.8 PG — SIGNIFICANT CHANGE UP (ref 27–34)
MCHC RBC-ENTMCNC: 31.7 GM/DL — LOW (ref 32–36)
MCV RBC AUTO: 90.8 FL — SIGNIFICANT CHANGE UP (ref 80–100)
MONOCYTES # BLD AUTO: 1.39 K/UL — HIGH (ref 0–0.9)
MONOCYTES NFR BLD AUTO: 9.9 % — SIGNIFICANT CHANGE UP (ref 2–14)
MONOS+MACROS # FLD: 3 % — SIGNIFICANT CHANGE UP
NEUTROPHILS # BLD AUTO: 10.88 K/UL — HIGH (ref 1.8–7.4)
NEUTROPHILS NFR BLD AUTO: 77.4 % — HIGH (ref 43–77)
NEUTROPHILS-BODY FLUID: 95 % — SIGNIFICANT CHANGE UP
PLATELET # BLD AUTO: 318 K/UL — SIGNIFICANT CHANGE UP (ref 150–400)
POTASSIUM SERPL-MCNC: 4.3 MMOL/L — SIGNIFICANT CHANGE UP (ref 3.5–5.3)
POTASSIUM SERPL-SCNC: 4.3 MMOL/L — SIGNIFICANT CHANGE UP (ref 3.5–5.3)
PROT SERPL-MCNC: 8.1 GM/DL — SIGNIFICANT CHANGE UP (ref 6–8.3)
PROTHROM AB SERPL-ACNC: 21.1 SEC — HIGH (ref 10.5–13.4)
RBC # BLD: 4.24 M/UL — SIGNIFICANT CHANGE UP (ref 4.2–5.8)
RBC # FLD: 15.2 % — HIGH (ref 10.3–14.5)
RCV VOL RI: SIGNIFICANT CHANGE UP /UL (ref 0–0)
SODIUM SERPL-SCNC: 133 MMOL/L — LOW (ref 135–145)
SYNOVIAL CRYSTALS CLARITY: ABNORMAL
SYNOVIAL CRYSTALS COLOR: ABNORMAL
SYNOVIAL CRYSTALS ID: SIGNIFICANT CHANGE UP
SYNOVIAL CRYSTALS TUBE: SIGNIFICANT CHANGE UP
TOTAL NUCLEATED CELL COUNT, BODY FLUID: SIGNIFICANT CHANGE UP /UL
TUBE TYPE: SIGNIFICANT CHANGE UP
WBC # BLD: 14.05 K/UL — HIGH (ref 3.8–10.5)
WBC # FLD AUTO: 14.05 K/UL — HIGH (ref 3.8–10.5)

## 2022-05-12 PROCEDURE — 97530 THERAPEUTIC ACTIVITIES: CPT | Mod: GP

## 2022-05-12 PROCEDURE — 85730 THROMBOPLASTIN TIME PARTIAL: CPT

## 2022-05-12 PROCEDURE — 80053 COMPREHEN METABOLIC PANEL: CPT

## 2022-05-12 PROCEDURE — 80048 BASIC METABOLIC PNL TOTAL CA: CPT

## 2022-05-12 PROCEDURE — 71045 X-RAY EXAM CHEST 1 VIEW: CPT | Mod: 26

## 2022-05-12 PROCEDURE — 71045 X-RAY EXAM CHEST 1 VIEW: CPT

## 2022-05-12 PROCEDURE — 80202 ASSAY OF VANCOMYCIN: CPT

## 2022-05-12 PROCEDURE — 84134 ASSAY OF PREALBUMIN: CPT

## 2022-05-12 PROCEDURE — C1751: CPT

## 2022-05-12 PROCEDURE — U0005: CPT

## 2022-05-12 PROCEDURE — 87075 CULTR BACTERIA EXCEPT BLOOD: CPT

## 2022-05-12 PROCEDURE — 86850 RBC ANTIBODY SCREEN: CPT

## 2022-05-12 PROCEDURE — 87070 CULTURE OTHR SPECIMN AEROBIC: CPT

## 2022-05-12 PROCEDURE — 93010 ELECTROCARDIOGRAM REPORT: CPT

## 2022-05-12 PROCEDURE — 36569 INSJ PICC 5 YR+ W/O IMAGING: CPT

## 2022-05-12 PROCEDURE — 99223 1ST HOSP IP/OBS HIGH 75: CPT

## 2022-05-12 PROCEDURE — 83605 ASSAY OF LACTIC ACID: CPT

## 2022-05-12 PROCEDURE — 73030 X-RAY EXAM OF SHOULDER: CPT | Mod: 26,RT

## 2022-05-12 PROCEDURE — 85025 COMPLETE CBC W/AUTO DIFF WBC: CPT

## 2022-05-12 PROCEDURE — U0003: CPT

## 2022-05-12 PROCEDURE — 85027 COMPLETE CBC AUTOMATED: CPT

## 2022-05-12 PROCEDURE — 85652 RBC SED RATE AUTOMATED: CPT

## 2022-05-12 PROCEDURE — 89060 EXAM SYNOVIAL FLUID CRYSTALS: CPT

## 2022-05-12 PROCEDURE — 86900 BLOOD TYPING SEROLOGIC ABO: CPT

## 2022-05-12 PROCEDURE — 97116 GAIT TRAINING THERAPY: CPT | Mod: GP

## 2022-05-12 PROCEDURE — 87040 BLOOD CULTURE FOR BACTERIA: CPT

## 2022-05-12 PROCEDURE — 20611 DRAIN/INJ JOINT/BURSA W/US: CPT | Mod: RT

## 2022-05-12 PROCEDURE — 88304 TISSUE EXAM BY PATHOLOGIST: CPT

## 2022-05-12 PROCEDURE — 89051 BODY FLUID CELL COUNT: CPT

## 2022-05-12 PROCEDURE — 85610 PROTHROMBIN TIME: CPT

## 2022-05-12 PROCEDURE — 97162 PT EVAL MOD COMPLEX 30 MIN: CPT | Mod: GP

## 2022-05-12 PROCEDURE — 87186 SC STD MICRODIL/AGAR DIL: CPT

## 2022-05-12 PROCEDURE — C1729: CPT

## 2022-05-12 PROCEDURE — 36415 COLL VENOUS BLD VENIPUNCTURE: CPT

## 2022-05-12 PROCEDURE — 86901 BLOOD TYPING SEROLOGIC RH(D): CPT

## 2022-05-12 PROCEDURE — 73201 CT UPPER EXTREMITY W/DYE: CPT | Mod: RT

## 2022-05-12 PROCEDURE — 87150 DNA/RNA AMPLIFIED PROBE: CPT

## 2022-05-12 PROCEDURE — 93306 TTE W/DOPPLER COMPLETE: CPT

## 2022-05-12 PROCEDURE — 99285 EMERGENCY DEPT VISIT HI MDM: CPT

## 2022-05-12 PROCEDURE — 86140 C-REACTIVE PROTEIN: CPT

## 2022-05-12 RX ORDER — PIPERACILLIN AND TAZOBACTAM 4; .5 G/20ML; G/20ML
3.38 INJECTION, POWDER, LYOPHILIZED, FOR SOLUTION INTRAVENOUS EVERY 8 HOURS
Refills: 0 | Status: DISCONTINUED | OUTPATIENT
Start: 2022-05-12 | End: 2022-05-13

## 2022-05-12 RX ORDER — MORPHINE SULFATE 50 MG/1
2 CAPSULE, EXTENDED RELEASE ORAL ONCE
Refills: 0 | Status: DISCONTINUED | OUTPATIENT
Start: 2022-05-12 | End: 2022-05-12

## 2022-05-12 RX ORDER — ONDANSETRON 8 MG/1
4 TABLET, FILM COATED ORAL ONCE
Refills: 0 | Status: COMPLETED | OUTPATIENT
Start: 2022-05-12 | End: 2022-05-12

## 2022-05-12 RX ORDER — PIPERACILLIN AND TAZOBACTAM 4; .5 G/20ML; G/20ML
3.38 INJECTION, POWDER, LYOPHILIZED, FOR SOLUTION INTRAVENOUS ONCE
Refills: 0 | Status: COMPLETED | OUTPATIENT
Start: 2022-05-12 | End: 2022-05-12

## 2022-05-12 RX ORDER — SODIUM CHLORIDE 9 MG/ML
3 INJECTION INTRAMUSCULAR; INTRAVENOUS; SUBCUTANEOUS ONCE
Refills: 0 | Status: COMPLETED | OUTPATIENT
Start: 2022-05-12 | End: 2022-05-12

## 2022-05-12 RX ORDER — SODIUM CHLORIDE 9 MG/ML
1000 INJECTION, SOLUTION INTRAVENOUS
Refills: 0 | Status: DISCONTINUED | OUTPATIENT
Start: 2022-05-12 | End: 2022-05-14

## 2022-05-12 RX ORDER — SODIUM CHLORIDE 9 MG/ML
500 INJECTION INTRAMUSCULAR; INTRAVENOUS; SUBCUTANEOUS ONCE
Refills: 0 | Status: COMPLETED | OUTPATIENT
Start: 2022-05-12 | End: 2022-05-12

## 2022-05-12 RX ORDER — ACETAMINOPHEN 500 MG
650 TABLET ORAL ONCE
Refills: 0 | Status: COMPLETED | OUTPATIENT
Start: 2022-05-12 | End: 2022-05-12

## 2022-05-12 RX ADMIN — SODIUM CHLORIDE 500 MILLILITER(S): 9 INJECTION INTRAMUSCULAR; INTRAVENOUS; SUBCUTANEOUS at 20:15

## 2022-05-12 RX ADMIN — Medication 650 MILLIGRAM(S): at 20:14

## 2022-05-12 RX ADMIN — Medication 650 MILLIGRAM(S): at 22:30

## 2022-05-12 RX ADMIN — SODIUM CHLORIDE 3 MILLILITER(S): 9 INJECTION INTRAMUSCULAR; INTRAVENOUS; SUBCUTANEOUS at 16:36

## 2022-05-12 RX ADMIN — PIPERACILLIN AND TAZOBACTAM 200 GRAM(S): 4; .5 INJECTION, POWDER, LYOPHILIZED, FOR SOLUTION INTRAVENOUS at 22:20

## 2022-05-12 RX ADMIN — Medication 0.25 MILLIGRAM(S): at 22:30

## 2022-05-12 RX ADMIN — MORPHINE SULFATE 2 MILLIGRAM(S): 50 CAPSULE, EXTENDED RELEASE ORAL at 16:58

## 2022-05-12 RX ADMIN — ONDANSETRON 4 MILLIGRAM(S): 8 TABLET, FILM COATED ORAL at 16:30

## 2022-05-12 NOTE — ED PROVIDER NOTE - OBJECTIVE STATEMENT
88 y/o male with a PMHx of Afib on Eliquis, HTN, HLD, prostate cancer presents to the ED BIBA from home for right shoulder pain x1 week. No trauma or injury. Pt has been taking Tylenol with Codeine at home without relief. Denies fevers. Pt currently on abx for LE cellulitis. No other complaints at this time. 86 y/o male with a PMHx of Afib on Eliquis, HTN, HLD, prostate cancer presents to the ED BIBA from home for right shoulder pain x1 week. No trauma or injury. Pt has been taking Tylenol with Codeine at home without relief. Denies fevers. Pt currently on abx for LE cellulitis. No other complaints at this time. applying heat to shoulder.

## 2022-05-12 NOTE — ED PROVIDER NOTE - CLINICAL SUMMARY MEDICAL DECISION MAKING FREE TEXT BOX
Pt with hx of Afib on Eliquis and recent cellulitis treated with abx and dressing changes here with right shoulder pain. No injury. Plan: labs, XR, pain meds.

## 2022-05-12 NOTE — H&P ADULT - NSICDXFAMILYHX_GEN_ALL_CORE_FT
FAMILY HISTORY:  Father  Still living? Unknown  Patient's father is , Age at diagnosis: Age Unknown    Mother  Still living? Unknown  Patient's mother is , Age at diagnosis: Age Unknown

## 2022-05-12 NOTE — ED ADULT TRIAGE NOTE - HEART RATE (BEATS/MIN)
Patient:   MARY BURNETTE            MRN: [id number]            FIN: [id number]              Age:   64 years     Sex:  FEMALE     :  56   Associated Diagnoses:   None   Author:   CHRIS SANCHEZ     Neurology Initial Consult Note  History  Ms. Perez Jolly is a 65 y/o woman with h/o HTN, DM2, endometrial CA, autoimmune hypoparathyroidism and hypothyroidism who p/w syncopal episode.  Initial labs notable for severe diabetic keto- acidosis with hyperglycemia, TSH 10.7 with normal T4.  Due to c/f possible myxedema coma she was given IV levothyroxine, endo following for DKA and this.  Initially she had mild hyponatremia, now mild hypernatremia as well as new leukocytosis.  No  description of shaking during the syncopal episode but she had been confused afterward.  She currently c/o fatigue but denies headache, dizziness, nausea, vomiting, numbness, tingling, weakness, diplopia, blurry vision, difficulty with speech, dysphagia.   Pt is COVID positive.  PMH  HTN  DM2  autoimmune hypoparathyroidism  hypothyroidism  endometrial CA  FHx  non contributory  Social  Denies tobacco, EtOH, illicit substances  Meds  Home Medications (16) Active  amLODIPine oral 10 mg tablet 10 mg = 1 tab, Oral, Daily  amLODIPine oral 5 mg tablet 5 mg = 1 tab, Oral, Daily  aspirin 81 mg, Oral, Daily  Carafate oral 1,000 mg tablet 1,000 mg = 1 tab, Oral, Q6H  enalapril 20 mg oral tablet 20 mg = 1 tab, Oral, Daily  enalapril oral 10 mg tablet 10 mg = 1 tab, Oral, Daily  HumaLOG Mix 75/25 (insulin lispro protamine-lispro) subcutaneous injection 75-25 unit/mL 15 unit = 0.15 mL, Subcutaneous, BID  hydroCHLOROthiazide oral 25 mg tablet (HCTZ) 25 mg = 1 tab, Oral, Daily  insulin aspart (NovoLOG) [auto-sub to insulin lispro (HumaLOG)]   lactobacillus acidophilus and bulgaricus oral granules 1 packet, Oral, TID  metFORMIN oral 1,000 mg tablet 1,000 mg = 1 tab, Oral, BID  pantoprazole oral 40 mg DR tablet 40 mg = 1 tab, Oral,  Daily  Synthroid 100 mcg (0.1 mg) oral tablet 100 mcg = 1 tab, Oral, Daily  Tradjenta oral 5 mg tablet 5 mg = 1 tab, Oral, Daily  Vitamin B-12 1,000 mcg, Oral, Daily  Vitamin D3 oral 50,000 unit capsule 50,000 unit = 1 cap, Oral, Q7 Days  Medications (18) Active  Scheduled: (10)  Aspirin 300 mg suppos  300 mg 1 suppository, Rectal, Daily  cefTRIAXone  1,000 mg 10 mL, Slow IV Push, Daily  DexaMETHasone 4 mg/1 mL inj SDV  6 mg 1.5 mL, Slow IV Push, Daily  doxycycline  100 mg, IVPB, Q12H (variable)  Enoxaparin 30 mg/0.3 mL syringe  30 mg 0.3 mL, Subcutaneous, Q24H  Famotidine 20 mg/2 mL inj SDV  20 mg 2 mL, Slow IV Push, Q24H  insulin glargine  20 unit 0.2 mL, Subcutaneous, Daily  Insulin human lispro 1 unit/0.01 mL inj  2-12 units, Subcutaneous, Q6H  Levothyroxine 100 mcg inj  50 mcg, Slow IV Push, Daily [before lunch]  Pneumococcal adult vaccine 23-polyvalent 0.5 mL IM inj SDV  0.5 mL, IM, On Call  Continuous: (1)  Sodium Chloride 0.9% 1,000 mL  1,000 mL, IV, 250 mL/hr  PRN: (7)  Acetaminophen 325 mg tab  650 mg 2 tab, Oral, Q6H  Dextrose (glucose) 40% 15 gm/37.5 gm oral gel UD  15 gm, Oral, As Directed PRN  Dextrose (glucose) 50% 25 gm/50 mL syringe  12.5 gm 25 mL, IV Push, As Directed PRN  Glucagon 1 mg/1 mL emergency kit SDV  1 mg 1 mL, IM, As Directed PRN  Melatonin 3 mg tab  3 mg 1 tab, Oral, Q Bedtime  Ondansetron 4 mg/2 mL inj SDV  4 mg 2 mL, Slow IV Push, Q8H  Senna-docusate sodium 8.6-50 mg tab  1 tab, Oral, Q Bedtime  All  Allergies (1) Active Reaction  NKA None Documented  ROS  10 Systems reviewed and all negative except as indicatedin HPI.  Physical Exam  GEN: Pt is in no pain or distres, pleasant, cooperative, appears tired  CVS: Regular rate and rhythm  CHEST: No signs of resp distress, on room air  ABD: Soft, non-tender, non-distended  SKIN: no obvious rash or lesion  Neuro Exam  MENTAL STATUS: Pt is awake, alert, oriented to person, place but not time (said \"2000 2000 for year, January for month),  registers 3 words but could not recall, fund of knowledge appropriate, conversant and appropriate  LANGUAGE/SPEECH: No dysarthria, naming and repetition intact, fluent speech, follows 3-step lateralizing complex commands  CRANIAL NERVES:   II: Pupils equal and reactive, no afferent or relative afferent pupillary defect, no red desaturation, visual fields intact to confrontation in all four quadrants, normal fundus    III, IV, VI: extra-ocular movements intact in all directions, no gaze preference or deviation, no nystagmus    V: normal sensation in V1, V2, and V3 segments bilaterally to light touch  VII: face is symmetric at rest and with facial expression   VIII: normal hearing to speech   IX, X: uvula and palate elevate normally and symmetrically normal   XI: 5/5 head turn and 5/5 shoulder shrug bilaterally   XII: midline tongue protrusion with no deviation  MOTOR: Normal bulk and tone.    RIGHT: 5/5 muscle power in shoulder abductors/adductors, elbow flexors/extensors, wrist flexors/extensors, finger abductors/adductors.  5/5 in hip flexors/extensors, knee flexors/extensors, ankle dorsiflexors and planter flexors.  LEFT: 5/5 muscle power in shoulder abductors/adductors, elbow flexors/extensors, wrist flexors/extensors, finger abductors/adductors.  5/5 in Lt hip flexors/extensors, knee flexors/extensors, ankle dorsiflexors and planter flexors.  REFLEXES: 1+ and symmetric throughout, bilateral flexor planter response, no Palma's, no clonus  SENSORY: Intact and symmetric to light touch, pinprick, vibration, and temp all limbs. No hemineglect, no extinction to double sided stimulation (visual & tactile)  COORDINATION: Normal finger to nose  STATION: deferred  GAIT: deferred  Labs  Labs between:  30-JUL-2020 14:59 to 31-JUL-2020 14:59  CBC:                 WBC  HgB  Hct  Plt  MCV  RDW   31-JUL-2020 (H) 11.8  (L) 10.2  (L) 31.1  183  87.9  14.8   30-JUL-2020 (H) 14.0  (L) 11.0  (L) 34.0  179  90.7  14.7   DIFF:                  Seg  Neutroph//ABS  Lymph//ABS  Mono//ABS  EOS/ABS  31-JUL-2020 NOT APPLICABLE  87 // (H) 10.3  4 // (L) 0.5  6 // 0.7 0 // (L) 0.0  30-JUL-2020 NOT APPLICABLE  81 // (H) 11.4  9 // 1.2 8 // (H) 1.1  0 // (L) 0.0  BMP:                 Na  Cl  BUN  Glu   31-JUL-2020 (H) 149  (H) 111  (H) 23  (H) 167                              K  CO2  Cr  Ca                              (L) 3.2  25  (H) 3.07  (L) 7.3   BMP:                 Na  Cl  BUN  Glu   31-JUL-2020 144  (H) 111  (H) 22  (H) 337                              K  CO2  Cr  Ca                              3.6  (L) 15  (H) 2.70  (L) 7.2   BMP:                 Na  Cl  BUN  Glu   30-JUL-2020 143  (H) 110  (H) 21  (H) 326                              K  CO2  Cr  Ca                              3.8  (L) 19  (H) 2.59  (L) 7.6   BMP:                 Na  Cl  BUN  Glu   30-JUL-2020 (H) 148  (H) 113  19  (H) 219                              K  CO2  Cr  Ca                              4.6  (L) 19  (H) 2.31  (L) 7.3   CMP:                 AST  ALT  AlkPhos  Bili  Albumin   31-JUL-2020 29  15  (H) 118  0.3  (L) 1.5   Other Chem:             Mg  Phos  Triglycerides  GGTP  DirectBili                           1.9  4.2         POC GLU:                 Latest Result  Latest Date  Minimum  Min Date  Maximum  Max Date                             (H) 126  31-JUL-2020 (H) 126  31-JUL-2020 (H) 186  30-JUL-2020  COAG:                 INR  PT  PTT  Ddimer  Fibrinogen    31-JUL-2020 1.2  (H) 12.3  32  (H) 2.18     Drug Levels:                           Vancomycin                   Random: 19.4                              Neurological Labs   No neurological lab results were found over the previous 24 hours.  Radiology  Assessment  Ms. Perez Jolly is a 63 y/o woman with h/o HTN, DM2, endometrial CA, autoimmune hypoparathyroidism and hypothyroidism who p/w syncopal episode.  Unclear if this was a true syncopal episode vs seizures as seizures occur frequently in DKA with hyperglycemia.   However, this would be a provoked seizure as so does not require treatment other than treating the underlying inciting factor.  If she has another episode that is concerning for seizure can  consider EEG. The disorientation to time is likely toxic-metabolic in etiology and could be related to COVID infection.   Recs  [] no specific neurological workup at this time    Thank you for allowing me to participate in the care of this patient.  Please page back for any questions/concerns or for any acute neurological changes.  Tal Hernandez MD, PhD  Cell: [phone]   85

## 2022-05-12 NOTE — ED ADULT NURSE NOTE - OBJECTIVE STATEMENT
pt is 86 yo male presents to ED from home c/o right shoulder pain x 1 week, denies any recent injury/falls/trauma to the affected shoulder.

## 2022-05-12 NOTE — ED PROVIDER NOTE - NSICDXFAMILYHX_GEN_ALL_CORE_FT
FAMILY HISTORY:  Family history non-contributory     FAMILY HISTORY:  Father  Still living? Unknown  Patient's father is , Age at diagnosis: Age Unknown    Mother  Still living? Unknown  Patient's mother is , Age at diagnosis: Age Unknown

## 2022-05-12 NOTE — H&P ADULT - CONSTITUTIONAL
NEW PATIENT EXAM      CHIEF COMPLAINT:    The patient presents for evaluation of her right hand numbness.     HISTORY OF PRESENT ILLNESS:  Karlene is a 64 year old female patient who presents today for an evaluation of her right hand numbness in.  Patient states this is been going on for many months but it has progressively gotten to the point where she has and tingling at all times in her 1st 3 fingers.  She states nighttime is the worst for her.  She has tried using braces but they are not helping.      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        ALLERGIES:    ALLERGIES:  Lisinopril-hctz and Seasonal     FAMILY HISTORY:  family history includes Cancer in her mother; Cancer (age of onset: 58) in her sister; Cancer, Prostate in her brother; Cirrhosis in her father; High blood pressure in her sister and sister; Patient is unaware of any medical problems in her maternal grandfather, maternal grandmother, paternal grandfather, paternal grandmother, and son.    SOCIAL HISTORY:     reports that she has never smoked. She has never used smokeless tobacco. She reports that she does not drink alcohol or use drugs.    REVIEW OF SYSTEMS:    Patient denies any nausea vomiting diarrhea shortness of breath chest pain or chest palpitation.    PHYSICIAL EXAMINATION:    General:  The patient is well nourished 64 year old female in no acute distress.  Alert and oriented x3, responds appropriately to questions, shows appropriate mood and  affect.  Vital Signs: There were no vitals taken for this visit.  Skin:  Pink, warm and dry.  Musculoskeletal:   Stance and balance:  Presents to the office today with a nonantalgic gait   Extremities:  On examination of her right upper extremity she does have +2 radial pulse, her neurovascular status is intact distally and she has good  strength.  She has a positive Tinel and a positive Phalen there is thenar emesis wasting as well.  Lungs:  Breathing regular nonlabored  Neurologic:  Sensation grossly intact    IMAGING:  X-ray studies were taken today and reviewed with the patient    Impression:    Karlene is a 64 year old female with right hand carpal tunnel syndrome      Recomendations:    At this time we need to get an EMG study to evaluate the carpal tunnel.  We will see her back with the results of her EMG.  If she has questions or concerns she can contact our office.     detailed exam

## 2022-05-12 NOTE — ED PROVIDER NOTE - PROGRESS NOTE DETAILS
Roland WOODARD for Dr. Dunn: Spoke to Dr. Baptiste who states he will send a resident to come evaluate the patient.  I, Laina Genao attest that this documentation has been prepared under the direction and in the presence of Doctor Dunn. Roland WOODARD for Dr. Dunn:  Spoke to Dr. Godwin who does not do shoulder.  I, Laina Genao attest that this documentation has been prepared under the direction and in the presence of Doctor Dunn. Roland WOODARD for Dr. Dunn: spoke to ortho PA, states he thinks the joint is infected, requested MRI, did not speak to MRI tech. I will call MRI to see if they will be doing it today.  I, Laina Genao attest that this documentation has been prepared under the direction and in the presence of Doctor Dunn. Roland WOODARD for Dr. Dunn: EKG inputted -- Afib 84 done at 15:16.   ILaina attest that this documentation has been prepared under the direction and in the presence of Doctor Dunn.

## 2022-05-12 NOTE — ED PROVIDER NOTE - NSICDXPASTSURGICALHX_GEN_ALL_CORE_FT
PAST SURGICAL HISTORY:  History of appendectomy     History of prostate cancer        PAST SURGICAL HISTORY:  History of appendectomy     History of prostate cancer

## 2022-05-12 NOTE — H&P ADULT - ASSESSMENT
88 yo male with a pmh/o Anemia, HLD, Afib, R Renal lesion, diverticulitis, 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 Admitted due to:    #Right should arthritis with septic joint  #Acute encephalopathy  Admit to medicine  Orthopedic consult appreciated  Pain control with tylenol and lidoderm patch  NPO except meds  LR while NPO  f/u aspiration results  f/u cultures  c/w zosyn  ID consult  OOB & ambulate with assistance  fall precautions  Neurochecks q 8 hrs  MR right shoulder  PT consult  case management consult for reinstatement of home services  SCD for dvt ppx- xarelto on hold due to possible OR    #Elevated alk phos  #Elevated AST  likely reactive in setting of possible OM of R shoulder and osseous mets  concerning for liver met given no prior h/o elevated AST  monitor on serum chemistry  denies GI sx    #Anemia  h/h at baseline  pt on xarelto   no bleeding on exam  f/u cbc, T&S in AM    #HypoNa  value: 133  s/p IVF, f/u AM cmp    #Protein calorie malnutrition  nutrition consult   f/u prealbumin    #HLD  DASH/TLC diet when reinstated  c/w statin    #Afib  HOLD xarelto for possible OR- reinstate when possible given afib  c/w tikosyn    #Venous stasis dermatitis  Wound care consult  elevate extremities

## 2022-05-12 NOTE — CHART NOTE - NSCHARTNOTEFT_GEN_A_CORE
Met with patient and spouse at bedside. Patient just received pain medication and is resting. The spouse explained that they live together in a private home with their daughter and granddaughter. Usually the patient can walk with the assistance of a walker, but for the past week his shoulders have been causing him pain. He was prescribed Tylenol #3, but is still unable to use his walker because moving his arm causes too much pain. No trauma noted. Seen by ortho in the ED and their is a concern for septic AC joint. They will aspirate and follow blood tests. The patient is active with [facility] for homecare. They come 2-3 times a week to do wound care for the cellulitis on his legs. He also has home PT 2X a week. At this time, he is unable to participate with PT due to pain. The patient has a supportive family and is never left alone at home. He has a walker and a commode. Family may need to private hire aides for additional help at home. CM department will follow to assist with a safe DC plan. Met with patient and spouse at bedside. Patient just received pain medication and is resting. The spouse explained that they live together in a private home with their daughter and granddaughter. Usually the patient can walk with the assistance of a walker, but for the past week his shoulders have been causing him pain. He was prescribed Tylenol #3, but is still unable to use his walker because moving his arm causes too much pain. No trauma noted. Seen by ortho in the ED and there is a concern for septic AC joint. They will aspirate and follow blood tests. The patient is active with [facility] for homecare. They come 2-3 times a week to do wound care for the cellulitis on his legs. He also has home PT 2X a week. At this time, he is unable to participate with PT due to pain. The patient has a supportive family and is never left alone at home. He has a walker and a commode. Family may need to private hire aides for additional help at home. CM department will follow to assist with a safe DC plan.

## 2022-05-12 NOTE — ED ADULT NURSE REASSESSMENT NOTE - NS ED NURSE REASSESS COMMENT FT1
pt unable to tolerate MRI, could not relax neck/head to fit into machine, tried multiple attempts to reposition/fit with no success.  reported to inpatient unit, brought to [facility].

## 2022-05-12 NOTE — ED ADULT NURSE NOTE - NSICDXPASTMEDICALHX_GEN_ALL_CORE_FT
PAST MEDICAL HISTORY:  Afib     HLD (hyperlipidemia)     HTN (hypertension)       Prostate cancer

## 2022-05-12 NOTE — H&P ADULT - SKIN COMMENTS
bilateral venous stasis dermatitis with healing vesicles of varying stages, no cellulitis however some erythema present, calves non tender to palpation

## 2022-05-12 NOTE — H&P ADULT - EXTREMITIES COMMENTS
bilateral venous stasis dermatitis with healing vesicles of varying stages. no evidence of cellulitis. no edema. ACE wraps removed for evaluation and legs elevated.

## 2022-05-12 NOTE — PATIENT PROFILE ADULT - FALL HARM RISK - HARM RISK INTERVENTIONS

## 2022-05-12 NOTE — ED ADULT TRIAGE NOTE - CHIEF COMPLAINT QUOTE
BIBA to ed from home for right shoulder pain and weakness x1 week. Denies chest pain and sob. Denies fall and injury.

## 2022-05-12 NOTE — CONSULT NOTE ADULT - SUBJECTIVE AND OBJECTIVE BOX
87y Male RHD presents c/o R shoulder pain x1 week. Pt denies clear inciting event, trauma, or history of similar pain. Pt normally ambulates with walker but has been unable to put pressure through left arm prompting him to come to ER. Pt was given prescription for tylenol with codeine but denies any improvement of pain. Pt has been treated for b/l LE lower extremity wounds by wound care and has recently completed course of ABX.  Denies numbness/tingling. Denies fever/chills. Denies pain/injury elsewhere. No other complaints.    HEALTH ISSUES - PROBLEM Dx:        MEDICATIONS  (STANDING):    Allergies    No Known Allergies    Intolerances                            12.2   14.05 )-----------( 318      ( 12 May 2022 16:28 )             38.5     12 May 2022 16:28    133    |  99     |  23     ----------------------------<  107    4.3     |  29     |  0.80     Ca    9.3        12 May 2022 16:28    TPro  8.1    /  Alb  2.7    /  TBili  0.8    /  DBili  x      /  AST  86     /  ALT  57     /  AlkPhos  190    12 May 2022 16:28    PT/INR - ( 12 May 2022 16:28 )   PT: 21.1 sec;   INR: 1.81 ratio         PTT - ( 12 May 2022 16:28 )  PTT:35.6 sec  Vital Signs Last 24 Hrs  T(C): 37.4 (05-12-22 @ 15:18), Max: 37.4 (05-12-22 @ 15:18)  T(F): 99.3 (05-12-22 @ 15:18), Max: 99.3 (05-12-22 @ 15:18)  HR: 85 (05-12-22 @ 15:18) (85 - 85)  BP: 164/88 (05-12-22 @ 15:18) (164/88 - 164/88)  BP(mean): --  RR: 18 (05-12-22 @ 15:18) (18 - 18)  SpO2: 96% (05-12-22 @ 15:18) (96% - 96%)    Imaging: XR showing degenerative changes but negative for acute fractures or dislocation     Physical Exam  Gen: NAD, Alert and Awake, Follows Commands  R/UE: Skin intact with significant swelling and erythema overlying AC joint, diffuse mild swelling about the shoulder. TTP over AC joint. Cannot ROM shoulder due to pain. Pain with micromotion of shoulder. r/u/m/ain/pin function intact. SILT over deltoid. SILT digits 1-5, warm to touch. 2+ radial pulse. Compartments soft and compressible.     A/P: 87y Male with R shoulder pain x 1 week, concerning for septic AC joint   Pain control  F/U ESR and CRP   Will discuss case with Dr Gross and advise plan    87y Male RHD presents c/o R shoulder pain x1 week. Pt denies clear inciting event, trauma, or history of similar pain. Pt normally ambulates with walker but has been unable to put pressure through left arm prompting him to come to ER. Pt was given prescription for tylenol with codeine but denies any improvement of pain. Pt has been treated for b/l LE lower extremity wounds by wound care and has recently completed course of ABX.  Denies numbness/tingling. Denies fever/chills. Denies pain/injury elsewhere. No other complaints.    HEALTH ISSUES - PROBLEM Dx:        MEDICATIONS  (STANDING):    Allergies    No Known Allergies    Intolerances                            12.2   14.05 )-----------( 318      ( 12 May 2022 16:28 )             38.5     12 May 2022 16:28    133    |  99     |  23     ----------------------------<  107    4.3     |  29     |  0.80     Ca    9.3        12 May 2022 16:28    TPro  8.1    /  Alb  2.7    /  TBili  0.8    /  DBili  x      /  AST  86     /  ALT  57     /  AlkPhos  190    12 May 2022 16:28    PT/INR - ( 12 May 2022 16:28 )   PT: 21.1 sec;   INR: 1.81 ratio         PTT - ( 12 May 2022 16:28 )  PTT:35.6 sec  Vital Signs Last 24 Hrs  T(C): 37.4 (05-12-22 @ 15:18), Max: 37.4 (05-12-22 @ 15:18)  T(F): 99.3 (05-12-22 @ 15:18), Max: 99.3 (05-12-22 @ 15:18)  HR: 85 (05-12-22 @ 15:18) (85 - 85)  BP: 164/88 (05-12-22 @ 15:18) (164/88 - 164/88)  BP(mean): --  RR: 18 (05-12-22 @ 15:18) (18 - 18)  SpO2: 96% (05-12-22 @ 15:18) (96% - 96%)    Imaging: XR showing degenerative changes but negative for acute fractures or dislocation     Physical Exam  Gen: NAD, Alert and Awake, Follows Commands  R/UE: Skin intact with significant swelling and erythema overlying AC joint, diffuse mild swelling about the shoulder. TTP over AC joint. Cannot ROM shoulder due to pain. Pain with micromotion of shoulder. r/u/m/ain/pin function intact. SILT over deltoid. SILT digits 1-5, warm to touch. 2+ radial pulse. Compartments soft and compressible.     A/P: 87y Male with R shoulder pain x 1 week, concerning for septic AC joint   Pain control  F/U ESR and CRP   MRI R shoulder   Will aspirate, f/u results  Will discuss case with Dr Gross and advise plan   87y Male RHD presents c/o R shoulder pain x1 week. Pt denies clear inciting event, trauma, or history of similar pain. Pt normally ambulates with walker but has been unable to put pressure through left arm prompting him to come to ER. Pt was given prescription for tylenol with codeine but denies any improvement of pain. Pt has been treated for b/l LE lower extremity wounds by wound care and has recently completed course of ABX.  Denies numbness/tingling. Denies fever/chills. Denies pain/injury elsewhere. No other complaints.    HEALTH ISSUES - PROBLEM Dx:        MEDICATIONS  (STANDING):    Allergies    No Known Allergies    Intolerances                            12.2   14.05 )-----------( 318      ( 12 May 2022 16:28 )             38.5     12 May 2022 16:28    133    |  99     |  23     ----------------------------<  107    4.3     |  29     |  0.80     Ca    9.3        12 May 2022 16:28    TPro  8.1    /  Alb  2.7    /  TBili  0.8    /  DBili  x      /  AST  86     /  ALT  57     /  AlkPhos  190    12 May 2022 16:28    PT/INR - ( 12 May 2022 16:28 )   PT: 21.1 sec;   INR: 1.81 ratio         PTT - ( 12 May 2022 16:28 )  PTT:35.6 sec  Vital Signs Last 24 Hrs  T(C): 37.4 (05-12-22 @ 15:18), Max: 37.4 (05-12-22 @ 15:18)  T(F): 99.3 (05-12-22 @ 15:18), Max: 99.3 (05-12-22 @ 15:18)  HR: 85 (05-12-22 @ 15:18) (85 - 85)  BP: 164/88 (05-12-22 @ 15:18) (164/88 - 164/88)  BP(mean): --  RR: 18 (05-12-22 @ 15:18) (18 - 18)  SpO2: 96% (05-12-22 @ 15:18) (96% - 96%)    Imaging: XR showing degenerative changes but negative for acute fractures or dislocation     Physical Exam  Gen: NAD, Alert and Awake, Follows Commands  R/UE: Skin intact with significant swelling and erythema overlying AC joint, diffuse mild swelling about the shoulder. TTP over AC joint. Cannot ROM shoulder due to pain. Pain with micromotion of shoulder. r/u/m/ain/pin function intact. SILT over deltoid. SILT digits 1-5, warm to touch. 2+ radial pulse. Compartments soft and compressible.     A/P: 87y Male with R shoulder pain x 1 week, concerning for septic AC joint   Pain control  F/U ESR and CRP   Medical admission  MRI R shoulder pending  F/U results of aspiration  Case discussed with Dr Gross who agrees iwth the plan above

## 2022-05-12 NOTE — H&P ADULT - HISTORY OF PRESENT ILLNESS
Pt is an 88 yo male with a pmh/o Anemia, HLD, Afib, R Renal lesion, diverticulitis, 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   Pt endorsing weakness, chills, fatigue, non radiating, severe, throbbing, constant, not quantifiable on pain scale R shoulder pain which is exacerbated by pressure or movement and alleviated by ice/rest.   Denies sob, cough, chest pain, palpitations, nausea, vomiting, leg swelling, orthopnea, headache, abd pain, diarrhea, constipation, rash, paresthesias, changes in vision/hearing/speech/gait.      Of note, pt receives home care, home PT, and wound care for chronic venous stasis dermatitis which was recently treated for cellulitis. Pt arrived to ED in bilateral ACE wraps which were removed for evaluation.

## 2022-05-12 NOTE — CHART NOTE - NSCHARTNOTEFT_GEN_A_CORE
Bedside aspiration performed. 3 cc of bloody fluid drained. Will send for culture, cell count.    Recommendations  FU aspiratoin results  NPOpMN and hold Eliquis until results of MRI are reviewed    d/w DR. Gross

## 2022-05-13 PROBLEM — I48.91 UNSPECIFIED ATRIAL FIBRILLATION: Chronic | Status: ACTIVE | Noted: 2022-02-15

## 2022-05-13 PROBLEM — I10 ESSENTIAL (PRIMARY) HYPERTENSION: Chronic | Status: ACTIVE | Noted: 2022-02-15

## 2022-05-13 PROBLEM — E78.5 HYPERLIPIDEMIA, UNSPECIFIED: Chronic | Status: ACTIVE | Noted: 2022-02-15

## 2022-05-13 LAB
ALBUMIN SERPL ELPH-MCNC: 2.2 G/DL — LOW (ref 3.3–5)
ALP SERPL-CCNC: 163 U/L — HIGH (ref 40–120)
ALT FLD-CCNC: 47 U/L — SIGNIFICANT CHANGE UP (ref 12–78)
ANION GAP SERPL CALC-SCNC: 9 MMOL/L — SIGNIFICANT CHANGE UP (ref 5–17)
APTT BLD: 36.4 SEC — HIGH (ref 27.5–35.5)
AST SERPL-CCNC: 74 U/L — HIGH (ref 15–37)
BASOPHILS # BLD AUTO: 0.05 K/UL — SIGNIFICANT CHANGE UP (ref 0–0.2)
BASOPHILS NFR BLD AUTO: 0.4 % — SIGNIFICANT CHANGE UP (ref 0–2)
BILIRUB SERPL-MCNC: 0.8 MG/DL — SIGNIFICANT CHANGE UP (ref 0.2–1.2)
BUN SERPL-MCNC: 24 MG/DL — HIGH (ref 7–23)
CALCIUM SERPL-MCNC: 8.6 MG/DL — SIGNIFICANT CHANGE UP (ref 8.5–10.1)
CHLORIDE SERPL-SCNC: 103 MMOL/L — SIGNIFICANT CHANGE UP (ref 96–108)
CO2 SERPL-SCNC: 26 MMOL/L — SIGNIFICANT CHANGE UP (ref 22–31)
CREAT SERPL-MCNC: 0.83 MG/DL — SIGNIFICANT CHANGE UP (ref 0.5–1.3)
CRP SERPL-MCNC: 247 MG/L — HIGH
EGFR: 85 ML/MIN/1.73M2 — SIGNIFICANT CHANGE UP
EOSINOPHIL # BLD AUTO: 0.04 K/UL — SIGNIFICANT CHANGE UP (ref 0–0.5)
EOSINOPHIL NFR BLD AUTO: 0.3 % — SIGNIFICANT CHANGE UP (ref 0–6)
GLUCOSE SERPL-MCNC: 109 MG/DL — HIGH (ref 70–99)
HCT VFR BLD CALC: 37.3 % — LOW (ref 39–50)
HGB BLD-MCNC: 11.4 G/DL — LOW (ref 13–17)
IMM GRANULOCYTES NFR BLD AUTO: 0.8 % — SIGNIFICANT CHANGE UP (ref 0–1.5)
INR BLD: 1.72 RATIO — HIGH (ref 0.88–1.16)
LYMPHOCYTES # BLD AUTO: 1.14 K/UL — SIGNIFICANT CHANGE UP (ref 1–3.3)
LYMPHOCYTES # BLD AUTO: 9.5 % — LOW (ref 13–44)
MCHC RBC-ENTMCNC: 28.3 PG — SIGNIFICANT CHANGE UP (ref 27–34)
MCHC RBC-ENTMCNC: 30.6 GM/DL — LOW (ref 32–36)
MCV RBC AUTO: 92.6 FL — SIGNIFICANT CHANGE UP (ref 80–100)
MONOCYTES # BLD AUTO: 1.2 K/UL — HIGH (ref 0–0.9)
MONOCYTES NFR BLD AUTO: 10 % — SIGNIFICANT CHANGE UP (ref 2–14)
NEUTROPHILS # BLD AUTO: 9.45 K/UL — HIGH (ref 1.8–7.4)
NEUTROPHILS NFR BLD AUTO: 79 % — HIGH (ref 43–77)
PLATELET # BLD AUTO: 278 K/UL — SIGNIFICANT CHANGE UP (ref 150–400)
POTASSIUM SERPL-MCNC: 4.3 MMOL/L — SIGNIFICANT CHANGE UP (ref 3.5–5.3)
POTASSIUM SERPL-SCNC: 4.3 MMOL/L — SIGNIFICANT CHANGE UP (ref 3.5–5.3)
PREALB SERPL-MCNC: 7 MG/DL — LOW (ref 20–40)
PROT SERPL-MCNC: 6.9 GM/DL — SIGNIFICANT CHANGE UP (ref 6–8.3)
PROTHROM AB SERPL-ACNC: 20.1 SEC — HIGH (ref 10.5–13.4)
RBC # BLD: 4.03 M/UL — LOW (ref 4.2–5.8)
RBC # FLD: 15.1 % — HIGH (ref 10.3–14.5)
SODIUM SERPL-SCNC: 138 MMOL/L — SIGNIFICANT CHANGE UP (ref 135–145)
WBC # BLD: 11.97 K/UL — HIGH (ref 3.8–10.5)
WBC # FLD AUTO: 11.97 K/UL — HIGH (ref 3.8–10.5)

## 2022-05-13 PROCEDURE — 99233 SBSQ HOSP IP/OBS HIGH 50: CPT

## 2022-05-13 PROCEDURE — 20611 DRAIN/INJ JOINT/BURSA W/US: CPT | Mod: RT

## 2022-05-13 RX ORDER — ATORVASTATIN CALCIUM 80 MG/1
10 TABLET, FILM COATED ORAL AT BEDTIME
Refills: 0 | Status: DISCONTINUED | OUTPATIENT
Start: 2022-05-13 | End: 2022-05-17

## 2022-05-13 RX ORDER — ALBUTEROL 90 UG/1
1 AEROSOL, METERED ORAL EVERY 6 HOURS
Refills: 0 | Status: DISCONTINUED | OUTPATIENT
Start: 2022-05-13 | End: 2022-05-20

## 2022-05-13 RX ORDER — TIMOLOL 0.5 %
1 DROPS OPHTHALMIC (EYE) DAILY
Refills: 0 | Status: DISCONTINUED | OUTPATIENT
Start: 2022-05-13 | End: 2022-05-20

## 2022-05-13 RX ORDER — VANCOMYCIN HCL 1 G
750 VIAL (EA) INTRAVENOUS EVERY 12 HOURS
Refills: 0 | Status: DISCONTINUED | OUTPATIENT
Start: 2022-05-13 | End: 2022-05-16

## 2022-05-13 RX ORDER — CEFTRIAXONE 500 MG/1
2000 INJECTION, POWDER, FOR SOLUTION INTRAMUSCULAR; INTRAVENOUS EVERY 24 HOURS
Refills: 0 | Status: DISCONTINUED | OUTPATIENT
Start: 2022-05-13 | End: 2022-05-16

## 2022-05-13 RX ORDER — DOFETILIDE 0.25 MG/1
250 CAPSULE ORAL
Refills: 0 | Status: DISCONTINUED | OUTPATIENT
Start: 2022-05-13 | End: 2022-05-20

## 2022-05-13 RX ORDER — ACETAMINOPHEN 500 MG
650 TABLET ORAL EVERY 6 HOURS
Refills: 0 | Status: DISCONTINUED | OUTPATIENT
Start: 2022-05-13 | End: 2022-05-20

## 2022-05-13 RX ORDER — LIDOCAINE 4 G/100G
1 CREAM TOPICAL EVERY 24 HOURS
Refills: 0 | Status: DISCONTINUED | OUTPATIENT
Start: 2022-05-13 | End: 2022-05-20

## 2022-05-13 RX ADMIN — Medication 650 MILLIGRAM(S): at 22:10

## 2022-05-13 RX ADMIN — DOFETILIDE 250 MICROGRAM(S): 0.25 CAPSULE ORAL at 21:14

## 2022-05-13 RX ADMIN — PIPERACILLIN AND TAZOBACTAM 25 GRAM(S): 4; .5 INJECTION, POWDER, LYOPHILIZED, FOR SOLUTION INTRAVENOUS at 05:24

## 2022-05-13 RX ADMIN — Medication 250 MILLIGRAM(S): at 11:12

## 2022-05-13 RX ADMIN — Medication 250 MILLIGRAM(S): at 21:16

## 2022-05-13 RX ADMIN — SODIUM CHLORIDE 75 MILLILITER(S): 9 INJECTION, SOLUTION INTRAVENOUS at 00:00

## 2022-05-13 RX ADMIN — LIDOCAINE 1 PATCH: 4 CREAM TOPICAL at 10:14

## 2022-05-13 RX ADMIN — Medication 1 DROP(S): at 10:08

## 2022-05-13 RX ADMIN — ATORVASTATIN CALCIUM 10 MILLIGRAM(S): 80 TABLET, FILM COATED ORAL at 21:14

## 2022-05-13 RX ADMIN — DOFETILIDE 250 MICROGRAM(S): 0.25 CAPSULE ORAL at 10:07

## 2022-05-13 RX ADMIN — Medication 650 MILLIGRAM(S): at 21:13

## 2022-05-13 RX ADMIN — LIDOCAINE 1 PATCH: 4 CREAM TOPICAL at 23:35

## 2022-05-13 RX ADMIN — CEFTRIAXONE 100 MILLIGRAM(S): 500 INJECTION, POWDER, FOR SOLUTION INTRAMUSCULAR; INTRAVENOUS at 11:12

## 2022-05-13 NOTE — DIETITIAN INITIAL EVALUATION ADULT - PERTINENT MEDS FT
MEDICATIONS  (STANDING):  atorvastatin 10 milliGRAM(s) Oral at bedtime  cefTRIAXone   IVPB 2000 milliGRAM(s) IV Intermittent every 24 hours  dofetilide 250 MICROGram(s) Oral two times a day  lactated ringers. 1000 milliLiter(s) (75 mL/Hr) IV Continuous <Continuous>  lidocaine   4% Patch 1 Patch Transdermal every 24 hours  timolol 0.25% Solution 1 Drop(s) Both EYES daily  vancomycin  IVPB 750 milliGRAM(s) IV Intermittent every 12 hours    MEDICATIONS  (PRN):  acetaminophen     Tablet .. 650 milliGRAM(s) Oral every 6 hours PRN Temp greater or equal to 38C (100.4F), Mild Pain (1 - 3)  ALBUTerol    90 MICROgram(s) HFA Inhaler 1 Puff(s) Inhalation every 6 hours PRN Shortness of Breath and/or Wheezing

## 2022-05-13 NOTE — PROGRESS NOTE ADULT - SUBJECTIVE AND OBJECTIVE BOX
CC: Acute encephalopathy secondary to Septic arthritis  History of Present Illness:   Pt is an 86 yo male with a pmh/o Anemia, HLD, Afib, R Renal lesion, diverticulitis, 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   Pt endorsing weakness, chills, fatigue, non radiating, severe, throbbing, constant, not quantifiable on pain scale R shoulder pain which is exacerbated by pressure or movement and alleviated by ice/rest.   Denies sob, cough, chest pain, palpitations, nausea, vomiting, leg swelling, orthopnea, headache, abd pain, diarrhea, constipation, rash, paresthesias, changes in vision/hearing/speech/gait.   Of note, pt receives home care, home PT, and wound care for chronic venous stasis dermatitis which was recently treated for cellulitis. Pt arrived to ED in bilateral ACE wraps which were removed for evaluation.       5/13: Lying in bed, alert, mildly forgetful but comfortable, right shoulder tenderness.  Denies fever, chills, N, V, abd pain, CP, SOB.    REVIEW OF SYSTEMS: All other review of systems is negative unless indicated above.     Vital Signs Last 24 Hrs  T(C): 37.5 (13 May 2022 08:07), Max: 38.8 (12 May 2022 19:47)  T(F): 99.5 (13 May 2022 08:07), Max: 101.9 (12 May 2022 19:47)  HR: 88 (13 May 2022 08:07) (85 - 96)  BP: 131/60 (13 May 2022 08:07) (105/50 - 164/88)  BP(mean): 66 (12 May 2022 19:47) (66 - 66)  RR: 18 (12 May 2022 23:15) (18 - 18)  SpO2: 93% (13 May 2022 08:07) (92% - 96%)    PHYSICAL EXAM:    Constitutional: NAD, awake and alert, confused  HEENT: PERR, EOMI, Normal Hearing, MMM  Neck: Soft and supple  Respiratory: Breath sounds are clear bilaterally, No wheezing, rales or rhonchi  Cardiovascular: S1 and S2, regular rate and rhythm, no Murmurs, gallops or rubs  Gastrointestinal: Bowel Sounds present, soft, nontender, nondistended, no guarding, no rebound  Extremities: No peripheral edema, right shoulder warm, erythematous, tender, and edematous   Neurological: A/O x 2, no focal deficits in my limited exam          MEDICATIONS  (STANDING):  atorvastatin 10 milliGRAM(s) Oral at bedtime  cefTRIAXone   IVPB 2000 milliGRAM(s) IV Intermittent every 24 hours  dofetilide 250 MICROGram(s) Oral two times a day  lactated ringers. 1000 milliLiter(s) (75 mL/Hr) IV Continuous <Continuous>  lidocaine   4% Patch 1 Patch Transdermal every 24 hours  timolol 0.25% Solution 1 Drop(s) Both EYES daily  vancomycin  IVPB 750 milliGRAM(s) IV Intermittent every 12 hours    MEDICATIONS  (PRN):  acetaminophen     Tablet .. 650 milliGRAM(s) Oral every 6 hours PRN Temp greater or equal to 38C (100.4F), Mild Pain (1 - 3)  ALBUTerol    90 MICROgram(s) HFA Inhaler 1 Puff(s) Inhalation every 6 hours PRN Shortness of Breath and/or Wheezing                                11.4   11.97 )-----------( 278      ( 13 May 2022 07:57 )             37.3     05-13    138  |  103  |  24<H>  ----------------------------<  109<H>  4.3   |  26  |  0.83    Ca    8.6      13 May 2022 07:57    TPro  6.9  /  Alb  2.2<L>  /  TBili  0.8  /  DBili  x   /  AST  74<H>  /  ALT  47  /  AlkPhos  163<H>  05-13    CAPILLARY BLOOD GLUCOSE        LIVER FUNCTIONS - ( 13 May 2022 07:57 )  Alb: 2.2 g/dL / Pro: 6.9 gm/dL / ALK PHOS: 163 U/L / ALT: 47 U/L / AST: 74 U/L / GGT: x           PT/INR - ( 13 May 2022 07:57 )   PT: 20.1 sec;   INR: 1.72 ratio         PTT - ( 13 May 2022 07:57 )  PTT:36.4 sec          Assessment/Plan: 86 yo male with a pmh/o Anemia, HLD, Afib, R Renal lesion, diverticulitis, 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 Admitted due to:    #Metabolic encephalopathy and right should pain due to arthritis with septic joint:  - shoulder xray --> Right shoulder degeneration.  - IV ceftriaxone and vanco per ID  - f/u cultures  -  leukocytosis improving, ESR elevated  - ortho following - s/p aspiration, f/u analysis  - Pt declines to pursue MRI imaging for further evaluation  - Pain control with tylenol and lidoderm patch  - NPO for now until further plan from ortho.   - PT       #Chronic anemia: STABLE      #Protein calorie malnutrition:  - nutrition consult     #HLD:  - c/w statin    #Paroxysmal Afib:  - HOLD xarelto for possible OR  - c/w tikosyn    #chronic lower extremity Venous stasis dermatitis:  - no active extremity infection  - Wound care   - elevate extremities    DVT ppx:  - SCD  - xarelto on hold due to possible OR

## 2022-05-13 NOTE — DIETITIAN INITIAL EVALUATION ADULT - PERTINENT LABORATORY DATA
05-13    138  |  103  |  24<H>  ----------------------------<  109<H>  4.3   |  26  |  0.83    Ca    8.6      13 May 2022 07:57    TPro  6.9  /  Alb  2.2<L>  /  TBili  0.8  /  DBili  x   /  AST  74<H>  /  ALT  47  /  AlkPhos  163<H>  05-13

## 2022-05-13 NOTE — CONSULT NOTE ADULT - REASON FOR ADMISSION
Acute encephalopathy secondary to Septic arthritis
Acute encephalopathy secondary to Septic arthritis

## 2022-05-13 NOTE — CONSULT NOTE ADULT - CONSULT REASON
R shoulder pain   Consult called [id number] seen [phone]
86yo M with possible right septic shoulder, referred to IR for aspiration
abx management

## 2022-05-13 NOTE — DIETITIAN INITIAL EVALUATION ADULT - ADD RECOMMEND
1) Advance diet to regular ASAP/ as medically feasible; would rec'd to obtain SLP consult to determine safest diet consistency, 2) Encourage protein-rich foods, maximize food preferences, 3) Add ensure enlive TID to optimize PO intake, 4) MVI w/ minerals daily to ensure 100% RDA met, 5) Consider adding thiamine 100 mg daily 2/2 poor PO intake/ malnutrition, 6) Monitor bowel movements, if no BM for >3 days, consider implementing bowel regimen. RD will continue to monitor PO intake, labs, hydration, and wt prn.  1) Advance diet to regular ASAP/ as medically feasible; would rec'd to obtain SLP consult to determine safest diet consistency, 2) Encourage protein-rich foods, maximize food preferences, 3) Add ensure enlive TID to optimize PO intake, 4) MVI w/ minerals daily to ensure 100% RDA met, 5) Consider adding thiamine 100 mg daily 2/2 poor PO intake/ malnutrition, 6) Monitor bowel movements, if no BM for >3 days, consider implementing bowel regimen, 7) Confirm goals of care regarding nutrition support. RD will continue to monitor PO intake, labs, hydration, and wt prn.

## 2022-05-13 NOTE — PHYSICAL THERAPY INITIAL EVALUATION ADULT - ACTIVE RANGE OF MOTION EXAMINATION, REHAB EVAL
RUE not tested 2/2 pain/Left UE Active ROM was WFL (within functional limits)/bilateral  lower extremity Active ROM was WFL (within functional limits)

## 2022-05-13 NOTE — CONSULT NOTE ADULT - SUBJECTIVE AND OBJECTIVE BOX
Chief complaint:  Patient is a 87y old  Male who presents with a chief complaint of Acute encephalopathy secondary to Septic arthritis      HPI:  Pt is an 88 yo male with a pmh/o Anemia, HLD, Afib, R Renal lesion, diverticulitis, prostate ca with mets to bone, bilateral lower extremity cellulitis, who presents from home due to worsening right shoulder pain and redness over past week. Ortho aspirated the AC joint yesterday, however only got 3cc of bloody fluid. IR consulted for aspiration. Pt seen at bedside, denied SOB, CP, NVD.     Allergies  No Known Allergies    MEDICATIONS  (STANDING):  atorvastatin 10 milliGRAM(s) Oral at bedtime  cefTRIAXone   IVPB 2000 milliGRAM(s) IV Intermittent every 24 hours  dofetilide 250 MICROGram(s) Oral two times a day  lactated ringers. 1000 milliLiter(s) (75 mL/Hr) IV Continuous <Continuous>  lidocaine   4% Patch 1 Patch Transdermal every 24 hours  timolol 0.25% Solution 1 Drop(s) Both EYES daily  vancomycin  IVPB 750 milliGRAM(s) IV Intermittent every 12 hours    MEDICATIONS  (PRN):  acetaminophen     Tablet .. 650 milliGRAM(s) Oral every 6 hours PRN Temp greater or equal to 38C (100.4F), Mild Pain (1 - 3)  ALBUTerol    90 MICROgram(s) HFA Inhaler 1 Puff(s) Inhalation every 6 hours PRN Shortness of Breath and/or Wheezing      PAST MEDICAL & SURGICAL HISTORY:  Afib      HTN (hypertension)      HLD (hyperlipidemia)      History of appendectomy      History of prostate cancer          FAMILY HISTORY:  Patient&#x27;s mother is  (Mother)    Patient&#x27;s father is  (Father)        Review of Systems:  As per HPI    Physical Exam:  Vital Signs Last 24 Hrs  T(C): 37.2 (13 May 2022 14:58), Max: 38.8 (12 May 2022 19:47)  T(F): 99 (13 May 2022 14:58), Max: 101.9 (12 May 2022 19:47)  HR: 92 (13 May 2022 14:58) (85 - 96)  BP: 116/54 (13 May 2022 14:58) (105/50 - 164/88)  BP(mean): 66 (12 May 2022 19:47) (66 - 66)  RR: 18 (13 May 2022 14:58) (18 - 18)  SpO2: 93% (13 May 2022 14:58) (92% - 96%)    GENERAL APPEARANCE: Well developed, in no acute distress.    LUNGS: Auscultation of the lungs revealed normal breath sounds without any other adventitious sounds or rubs.    CARDIOVASCULAR: There was a regular rate and rhythm    ABDOMEN: Soft and nontender with normal bowel sounds.     NEUROLOGIC: Somewhat confused    RIGHT SHOULDER: Red, swollen    CBC                        11.4   11.97 )-----------( 278      ( 13 May 2022 07:57 )             37.3       Chemistry      138  |  103  |  24<H>  ----------------------------<  109<H>  4.3   |  26  |  0.83    Ca    8.6      13 May 2022 07:57    TPro  6.9  /  Alb  2.2<L>  /  TBili  0.8  /  DBili  x   /  AST  74<H>  /  ALT  47  /  AlkPhos  163<H>  13    Coags  PT/INR - ( 13 May 2022 07:57 )   PT: 20.1 sec;   INR: 1.72 ratio    PTT - ( 13 May 2022 07:57 )  PTT:36.4 sec

## 2022-05-13 NOTE — CONSULT NOTE ADULT - SUBJECTIVE AND OBJECTIVE BOX
Patient is a 87y old  Male who presents with a chief complaint of Acute encephalopathy secondary to Septic arthritis     HPI:  86 y/o male with h/o Anemia, HLD, A.fib, R renal lesion, diverticulitis, prostate Ca with mets to bone, bilateral lower extremity cellulitis was admitted on  for worsening right shoulder pain and redness over past week, limiting range of motion and ambulation as he uses walker and has not been able to bare weight on that side. Pt is endorsing weakness, chills, fatigue, non radiating, severe, throbbing, constant, not quantifiable on pain scale R shoulder pain which is exacerbated by pressure or movement and alleviated by ice/rest. No fever or chills reported at home. In ER he was noted febrile to 101F and received zosyn.     Of note, pt receives home care, home PT, and wound care for chronic venous stasis dermatitis which was recently treated for cellulitis. Pt arrived to ED in bilateral ACE wraps.    PMH: as above  PSH: as above  Meds: per reconciliation sheet, noted below  MEDICATIONS  (STANDING):  atorvastatin 10 milliGRAM(s) Oral at bedtime  dofetilide 250 MICROGram(s) Oral two times a day  lactated ringers. 1000 milliLiter(s) (75 mL/Hr) IV Continuous <Continuous>  lidocaine   4% Patch 1 Patch Transdermal every 24 hours  piperacillin/tazobactam IVPB.. 3.375 Gram(s) IV Intermittent every 8 hours  timolol 0.25% Solution 1 Drop(s) Both EYES daily    MEDICATIONS  (PRN):  acetaminophen     Tablet .. 650 milliGRAM(s) Oral every 6 hours PRN Temp greater or equal to 38C (100.4F), Mild Pain (1 - 3)  ALBUTerol    90 MICROgram(s) HFA Inhaler 1 Puff(s) Inhalation every 6 hours PRN Shortness of Breath and/or Wheezing    Allergies    No Known Allergies    Intolerances      Social: no smoking, no alcohol, no illegal drugs; no recent travel, no exposure to TB  FAMILY HISTORY:  Patient&#x27;s mother is  (Mother)    Patient&#x27;s father is  (Father)      no history of premature cardiovascular disease in first degree relatives    ROS: the patient denies HA, no seizures, no dizziness, no sore throat, no nasal congestion, no blurry vision, no CP, no palpitations, no SOB, no cough, no abdominal pain, no diarrhea, no N/V, no dysuria, no leg pain, no claudication, no rash, has right shoulder pain, no rectal pain or bleeding, no night sweats  All other systems reviewed and are negative    Vital Signs Last 24 Hrs  T(C): 37.5 (13 May 2022 08:07), Max: 38.8 (12 May 2022 19:47)  T(F): 99.5 (13 May 2022 08:07), Max: 101.9 (12 May 2022 19:47)  HR: 88 (13 May 2022 08:07) (85 - 96)  BP: 131/60 (13 May 2022 08:07) (105/50 - 164/88)  BP(mean): 66 (12 May 2022 19:47) (66 - 66)  RR: 18 (12 May 2022 23:15) (18 - 18)  SpO2: 93% (13 May 2022 08:07) (92% - 96%)  Daily Height in cm: 177.8 (12 May 2022 15:18)    Daily     PE:    Constitutional:  No acute distress  HEENT: NC/AT, EOMI, PERRLA, conjunctivae clear; ears and nose atraumatic; pharynx benign  Neck: supple; thyroid not palpable  Back: no tenderness  Respiratory: respiratory effort normal; clear to auscultation  Cardiovascular: S1S2 regular, no murmurs  Abdomen: soft, not tender, not distended, positive BS; no liver or spleen organomegaly  Genitourinary: no suprapubic tenderness  Lymphatic: no LN palpable  Musculoskeletal: no muscle tenderness, no joint swelling or tenderness  Skin intact with significant swelling and erythema overlying AC joint, diffuse mild swelling about the shoulder. TTP over AC joint. Cannot ROM shoulder due to pain. Pain with micromotion of shoulder.  Right shoulder pain  Extremities: 1+ pedal edema  o open wounds. Bilateral lower extremities with dry scaling skin and Hemosiderin staining  Neurological/ Psychiatric: AxOx3, judgement and insight normal; moving all extremities  Skin: no rashes; no palpable lesions    Labs: all available labs reviewed                        11.4   11.97 )-----------( 278      ( 13 May 2022 07:57 )             37.3     05-13    138  |  103  |  24<H>  ----------------------------<  109<H>  4.3   |  26  |  0.83    Ca    8.6      13 May 2022 07:57    TPro  6.9  /  Alb  2.2<L>  /  TBili  0.8  /  DBili  x   /  AST  74<H>  /  ALT  47  /  AlkPhos  163<H>  05-13     LIVER FUNCTIONS - ( 13 May 2022 07:57 )  Alb: 2.2 g/dL / Pro: 6.9 gm/dL / ALK PHOS: 163 U/L / ALT: 47 U/L / AST: 74 U/L / GGT: x           COVID-19 PCR: NotDetec (22 @ 15:15)    Radiology: all available radiological tests reviewed    < from: Xray Chest 1 View- PORTABLE-Urgent (Xray Chest 1 View- PORTABLE-Urgent .) (22 @ 16:16) >  IMPRESSION: Right shoulder degeneration.  Persistent linear density at the right base medially.  < end of copied text >    Advanced directives addressed: full resuscitation Patient is a 87y old  Male who presents with a chief complaint of Acute encephalopathy secondary to Septic arthritis     HPI:  86 y/o male with h/o Anemia, HLD, A.fib, R renal lesion, diverticulitis, prostate Ca with mets to bone, bilateral lower extremity cellulitis was admitted on  for worsening right shoulder pain and redness over past week, limiting range of motion and ambulation as he uses walker and has not been able to bare weight on that side. Pt is endorsing weakness, chills, fatigue, non radiating, severe, throbbing, constant, not quantifiable on pain scale R shoulder pain which is exacerbated by pressure or movement and alleviated by ice/rest. No fever or chills reported at home. In ER he was noted febrile to 101F and received zosyn.     Of note, pt receives home care, home PT, and wound care for chronic venous stasis dermatitis which was recently treated for cellulitis. Pt arrived to ED in bilateral ACE wraps.    PMH: as above  PSH: as above  Meds: per reconciliation sheet, noted below  MEDICATIONS  (STANDING):  atorvastatin 10 milliGRAM(s) Oral at bedtime  dofetilide 250 MICROGram(s) Oral two times a day  lactated ringers. 1000 milliLiter(s) (75 mL/Hr) IV Continuous <Continuous>  lidocaine   4% Patch 1 Patch Transdermal every 24 hours  piperacillin/tazobactam IVPB.. 3.375 Gram(s) IV Intermittent every 8 hours  timolol 0.25% Solution 1 Drop(s) Both EYES daily    MEDICATIONS  (PRN):  acetaminophen     Tablet .. 650 milliGRAM(s) Oral every 6 hours PRN Temp greater or equal to 38C (100.4F), Mild Pain (1 - 3)  ALBUTerol    90 MICROgram(s) HFA Inhaler 1 Puff(s) Inhalation every 6 hours PRN Shortness of Breath and/or Wheezing    Allergies    No Known Allergies    Intolerances      Social: no smoking, no alcohol, no illegal drugs; no recent travel, no exposure to TB  FAMILY HISTORY:  Patient&#x27;s mother is  (Mother)    Patient&#x27;s father is  (Father)      no history of premature cardiovascular disease in first degree relatives    ROS: the patient denies HA, no seizures, no dizziness, no sore throat, no nasal congestion, no blurry vision, no CP, no palpitations, no SOB, no cough, no abdominal pain, no diarrhea, no N/V, no dysuria, no leg pain, no claudication, has right shoulder rash, has right shoulder pain, no rectal pain or bleeding, no night sweats  All other systems reviewed and are negative    Vital Signs Last 24 Hrs  T(C): 37.5 (13 May 2022 08:07), Max: 38.8 (12 May 2022 19:47)  T(F): 99.5 (13 May 2022 08:07), Max: 101.9 (12 May 2022 19:47)  HR: 88 (13 May 2022 08:07) (85 - 96)  BP: 131/60 (13 May 2022 08:07) (105/50 - 164/88)  BP(mean): 66 (12 May 2022 19:47) (66 - 66)  RR: 18 (12 May 2022 23:15) (18 - 18)  SpO2: 93% (13 May 2022 08:07) (92% - 96%)  Daily Height in cm: 177.8 (12 May 2022 15:18)    Daily     PE:    Constitutional:  No acute distress  HEENT: NC/AT, EOMI, PERRLA, conjunctivae clear; ears and nose atraumatic; pharynx benign  Neck: supple; thyroid not palpable  Back: no tenderness  Respiratory: respiratory effort normal; clear to auscultation  Cardiovascular: S1S2 regular, no murmurs  Abdomen: soft, not tender, not distended, positive BS; no liver or spleen organomegaly  Genitourinary: no suprapubic tenderness  Lymphatic: no LN palpable  Musculoskeletal: no muscle tenderness, no joint swelling or tenderness  Skin intact with significant swelling and erythema overlying AC joint, diffuse mild swelling about the shoulder. TTP over AC joint. Cannot ROM shoulder due to pain. Pain with micromotion of shoulder.  Right shoulder pain  Extremities: 1+ pedal edema  o open wounds. Bilateral lower extremities with dry scaling skin and Hemosiderin staining  Neurological/ Psychiatric: AxOx3, judgement and insight normal; moving all extremities  Skin: no rashes; no palpable lesions    Labs: all available labs reviewed                        11.4   11.97 )-----------( 278      ( 13 May 2022 07:57 )             37.3     05-13    138  |  103  |  24<H>  ----------------------------<  109<H>  4.3   |  26  |  0.83    Ca    8.6      13 May 2022 07:57    TPro  6.9  /  Alb  2.2<L>  /  TBili  0.8  /  DBili  x   /  AST  74<H>  /  ALT  47  /  AlkPhos  163<H>  05     LIVER FUNCTIONS - ( 13 May 2022 07:57 )  Alb: 2.2 g/dL / Pro: 6.9 gm/dL / ALK PHOS: 163 U/L / ALT: 47 U/L / AST: 74 U/L / GGT: x           COVID-19 PCR: NotDetec (22 @ 15:15)    Radiology: all available radiological tests reviewed    < from: Xray Chest 1 View- PORTABLE-Urgent (Xray Chest 1 View- PORTABLE-Urgent .) (22 @ 16:16) >  IMPRESSION: Right shoulder degeneration.  Persistent linear density at the right base medially.  < end of copied text >    Advanced directives addressed: full resuscitation

## 2022-05-13 NOTE — DIETITIAN INITIAL EVALUATION ADULT - OTHER INFO
88 yo male with a pmh/o Anemia, HLD, Afib, R Renal lesion, diverticulitis, 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 Of note, pt receives home care, home PT, and wound care for chronic venous stasis dermatitis which was recently treated for cellulitis.     Pt was previously seen by RD in Feb 2022 - met criteria for PCM and continues to meet criteria. Pt w/ AMS; is a poor historian. Noted with moderate to severe muscle/ fat wasting. Willing to trial ensure enlive TID. RD took bed scale wt on 5/13/22 at 193#; noted w/ mild edema skewing wt/ appearance. Currently NPO - possible aspiration as per H&P? Would obtain SLP consult to determine safest diet consistency and advance to regular diet. See other recommendations below.

## 2022-05-13 NOTE — DIETITIAN INITIAL EVALUATION ADULT - NS FNS DIET ORDER
Diet, NPO after Midnight:      NPO Start Date: 12-May-2022,   NPO Start Time: 23:59  Except Medications (05-12-22 @ 20:11)

## 2022-05-13 NOTE — PROGRESS NOTE ADULT - SUBJECTIVE AND OBJECTIVE BOX
Patient seen and examined at bedside. Patient endorses right shoulder pain. Patient denies any numbness, tingling, weakness, or any other orthopaedic complaint. Denies N/V/CP/SOB.         VITAL SIGNS:  T(C): 37.6 (05-12-22 @ 23:15), Max: 38.8 (05-12-22 @ 19:47)  HR: 91 (05-12-22 @ 23:15) (85 - 96)  BP: 109/54 (05-12-22 @ 23:15) (105/50 - 164/88)  RR: 18 (05-12-22 @ 23:15) (18 - 18)  SpO2: 92% (05-12-22 @ 23:15) (92% - 96%)      LABS:                        12.2   14.05 )-----------( 318      ( 12 May 2022 16:28 )             38.5     05-12    133<L>  |  99  |  23  ----------------------------<  107<H>  4.3   |  29  |  0.80    Ca    9.3      12 May 2022 16:28    TPro  8.1  /  Alb  2.7<L>  /  TBili  0.8  /  DBili  x   /  AST  86<H>  /  ALT  57  /  AlkPhos  190<H>  05-12    PT/INR - ( 12 May 2022 16:28 )   PT: 21.1 sec;   INR: 1.81 ratio         PTT - ( 12 May 2022 16:28 )  PTT:35.6 sec        Physical Exam  Gen: NAD, Alert and Awake, Follows Commands  R/UE: Skin intact with significant swelling and erythema overlying AC joint, diffuse mild swelling about the shoulder. TTP over AC joint. Cannot ROM shoulder due to pain. Pain with micromotion of shoulder. r/u/m/ain/pin function intact. SILT over deltoid. SILT digits 1-5, warm to touch. 2+ radial pulse. Compartments soft and compressible.     A/P: 87y Male with R shoulder pain x 1 week, concerning for septic AC joint   Pain control  F/U ESR and CRP   Medical admission  MRI R shoulder pending  F/U results of aspiration  Case discussed with Dr Gross who agrees with the plan above

## 2022-05-13 NOTE — PHYSICAL THERAPY INITIAL EVALUATION ADULT - GENERAL OBSERVATIONS, REHAB EVAL
Pt seen for 30min PT bedside Eval. Pt s/p Rt shoulder pain (possible septic AC jt), WBAT. Pt rec'd semi supine in bed in NAD, declining OOB 2/2 pain and fatigue, willing to participate in bedisde eval, appears confused.  pt ROM WFL, and strength grossly 3-/5 throughout, unable to test RUE 2/2 pain. Pt left semi supine in bed in NAD, all needs met, +bed alarm, RN Rashad haywood.

## 2022-05-13 NOTE — CONSULT NOTE ADULT - ASSESSMENT
88yo M with possible right septic shoulder, referred to IR for aspiration  - Case reviewed with Dr. Mccall  - As per ortho resident, they would like us to reattempt aspiration to see if it can yield more fluid for analysis

## 2022-05-13 NOTE — CONSULT NOTE ADULT - ASSESSMENT
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 Pt is endorsing weakness, chills, fatigue, non radiating, severe, throbbing, constant, not quantifiable on pain scale R shoulder pain which is exacerbated by pressure or movement and alleviated by ice/rest. No fever or chills reported at home. In ER he received zosyn.     1. Febrile syndrome. Right shoulder OA with pain. ?right AC joint septic arthritis.   -b/l lower legs chronic stasis dermatitis; doubt cellulitis  -leukocytosis  -obtain BC x 2  -ortho evaluation appreciated - right AC joint aspirated and fluid sent for analysis  -start ceftriaxone 2 gm IV qd and vancomycin 750 mg IV q12h  -reason for abx use and side effects reviewed with patient; monitor BMP and vancomycin trough levels   -old chart reviewed to assess prior cultures  -monitor temps  -f/u CBC  -supportive care  2. Other issues:   -care per medicine       86 y/o male with h/o Anemia, HLD, A.fib, R renal lesion, diverticulitis, prostate Ca with mets to bone, bilateral lower extremity cellulitis was admitted on 5/12 for worsening right shoulder pain and redness over past week, limiting range of motion and ambulation as he uses walker and has not been able to bare weight on that side. Pt is endorsing weakness, chills, fatigue, non radiating, severe, throbbing, constant, not quantifiable on pain scale R shoulder pain which is exacerbated by pressure or movement and alleviated by ice/rest. No fever or chills reported at home. In ER he received zosyn.     1. Febrile syndrome. Right shoulder OA with pain. Right shoulder aspect cellulitis with probable underlying right AC joint septic arthritis.   -b/l lower legs chronic stasis dermatitis; doubt cellulitis  -leukocytosis  -obtain BC x 2  -ortho evaluation appreciated - right AC joint aspirated and fluid sent for analysis  -start ceftriaxone 2 gm IV qd and vancomycin 750 mg IV q12h  -reason for abx use and side effects reviewed with patient; monitor BMP and vancomycin trough levels   -old chart reviewed to assess prior cultures  -monitor temps  -f/u CBC  -supportive care  2. Other issues:   -care per medicine

## 2022-05-13 NOTE — DIETITIAN NUTRITION RISK NOTIFICATION - TREATMENT: THE FOLLOWING DIET HAS BEEN RECOMMENDED
Diet, NPO after Midnight:      NPO Start Date: 12-May-2022,   NPO Start Time: 23:59  Except Medications (05-12-22 @ 20:11) [Active]

## 2022-05-13 NOTE — PHYSICAL THERAPY INITIAL EVALUATION ADULT - PLANNED THERAPY INTERVENTIONS, PT EVAL
GOAL: Pt will perform 5 stairs with or without U HR as needed within 4weeks./balance training/bed mobility training/gait training/strengthening/transfer training

## 2022-05-13 NOTE — ADVANCED PRACTICE NURSE CONSULT - RECOMMEDATIONS
1)Continue to Elevate heels off of Mattress  2)Continue to Turn and position every 2 Hours  3)Consult Dietitian   4) Apply Dimethicone lotion to bilateral lower extremities two times per day.   5) Maintain Heel Boots  6) Apply No Sting Barrier to Gluteal cleft

## 2022-05-14 LAB
ALBUMIN SERPL ELPH-MCNC: 2.1 G/DL — LOW (ref 3.3–5)
ALP SERPL-CCNC: 158 U/L — HIGH (ref 40–120)
ALT FLD-CCNC: 64 U/L — SIGNIFICANT CHANGE UP (ref 12–78)
ANION GAP SERPL CALC-SCNC: 6 MMOL/L — SIGNIFICANT CHANGE UP (ref 5–17)
APTT BLD: 34.5 SEC — SIGNIFICANT CHANGE UP (ref 27.5–35.5)
AST SERPL-CCNC: 113 U/L — HIGH (ref 15–37)
BILIRUB SERPL-MCNC: 0.5 MG/DL — SIGNIFICANT CHANGE UP (ref 0.2–1.2)
BUN SERPL-MCNC: 21 MG/DL — SIGNIFICANT CHANGE UP (ref 7–23)
CALCIUM SERPL-MCNC: 8.5 MG/DL — SIGNIFICANT CHANGE UP (ref 8.5–10.1)
CHLORIDE SERPL-SCNC: 103 MMOL/L — SIGNIFICANT CHANGE UP (ref 96–108)
CO2 SERPL-SCNC: 28 MMOL/L — SIGNIFICANT CHANGE UP (ref 22–31)
CREAT SERPL-MCNC: 0.66 MG/DL — SIGNIFICANT CHANGE UP (ref 0.5–1.3)
EGFR: 91 ML/MIN/1.73M2 — SIGNIFICANT CHANGE UP
GLUCOSE SERPL-MCNC: 107 MG/DL — HIGH (ref 70–99)
GRAM STN FLD: SIGNIFICANT CHANGE UP
HCT VFR BLD CALC: 33.8 % — LOW (ref 39–50)
HGB BLD-MCNC: 10.5 G/DL — LOW (ref 13–17)
INR BLD: 1.51 RATIO — HIGH (ref 0.88–1.16)
MCHC RBC-ENTMCNC: 28.7 PG — SIGNIFICANT CHANGE UP (ref 27–34)
MCHC RBC-ENTMCNC: 31.1 GM/DL — LOW (ref 32–36)
MCV RBC AUTO: 92.3 FL — SIGNIFICANT CHANGE UP (ref 80–100)
METHOD TYPE: SIGNIFICANT CHANGE UP
MSSA DNA SPEC QL NAA+PROBE: SIGNIFICANT CHANGE UP
PLATELET # BLD AUTO: 332 K/UL — SIGNIFICANT CHANGE UP (ref 150–400)
POTASSIUM SERPL-MCNC: 4 MMOL/L — SIGNIFICANT CHANGE UP (ref 3.5–5.3)
POTASSIUM SERPL-SCNC: 4 MMOL/L — SIGNIFICANT CHANGE UP (ref 3.5–5.3)
PROT SERPL-MCNC: 6.8 GM/DL — SIGNIFICANT CHANGE UP (ref 6–8.3)
PROTHROM AB SERPL-ACNC: 17.6 SEC — HIGH (ref 10.5–13.4)
RBC # BLD: 3.66 M/UL — LOW (ref 4.2–5.8)
RBC # FLD: 15.3 % — HIGH (ref 10.3–14.5)
SODIUM SERPL-SCNC: 137 MMOL/L — SIGNIFICANT CHANGE UP (ref 135–145)
SPECIMEN SOURCE: SIGNIFICANT CHANGE UP
VANCOMYCIN TROUGH SERPL-MCNC: 10.1 UG/ML — SIGNIFICANT CHANGE UP (ref 10–20)
WBC # BLD: 12.64 K/UL — HIGH (ref 3.8–10.5)
WBC # FLD AUTO: 12.64 K/UL — HIGH (ref 3.8–10.5)

## 2022-05-14 PROCEDURE — 99233 SBSQ HOSP IP/OBS HIGH 50: CPT

## 2022-05-14 PROCEDURE — 73201 CT UPPER EXTREMITY W/DYE: CPT | Mod: 26,RT

## 2022-05-14 RX ORDER — SODIUM CHLORIDE 9 MG/ML
1000 INJECTION, SOLUTION INTRAVENOUS
Refills: 0 | Status: DISCONTINUED | OUTPATIENT
Start: 2022-05-14 | End: 2022-05-14

## 2022-05-14 RX ORDER — SODIUM CHLORIDE 9 MG/ML
1000 INJECTION, SOLUTION INTRAVENOUS
Refills: 0 | Status: DISCONTINUED | OUTPATIENT
Start: 2022-05-15 | End: 2022-05-20

## 2022-05-14 RX ORDER — QUETIAPINE FUMARATE 200 MG/1
25 TABLET, FILM COATED ORAL
Refills: 0 | Status: DISCONTINUED | OUTPATIENT
Start: 2022-05-14 | End: 2022-05-20

## 2022-05-14 RX ORDER — ENOXAPARIN SODIUM 100 MG/ML
40 INJECTION SUBCUTANEOUS EVERY 24 HOURS
Refills: 0 | Status: DISCONTINUED | OUTPATIENT
Start: 2022-05-14 | End: 2022-05-14

## 2022-05-14 RX ADMIN — LIDOCAINE 1 PATCH: 4 CREAM TOPICAL at 21:52

## 2022-05-14 RX ADMIN — Medication 1 DROP(S): at 11:16

## 2022-05-14 RX ADMIN — Medication 250 MILLIGRAM(S): at 11:46

## 2022-05-14 RX ADMIN — CEFTRIAXONE 100 MILLIGRAM(S): 500 INJECTION, POWDER, FOR SOLUTION INTRAMUSCULAR; INTRAVENOUS at 11:08

## 2022-05-14 RX ADMIN — SODIUM CHLORIDE 75 MILLILITER(S): 9 INJECTION, SOLUTION INTRAVENOUS at 16:26

## 2022-05-14 RX ADMIN — DOFETILIDE 250 MICROGRAM(S): 0.25 CAPSULE ORAL at 11:14

## 2022-05-14 RX ADMIN — Medication 250 MILLIGRAM(S): at 21:45

## 2022-05-14 RX ADMIN — LIDOCAINE 1 PATCH: 4 CREAM TOPICAL at 20:52

## 2022-05-14 RX ADMIN — LIDOCAINE 1 PATCH: 4 CREAM TOPICAL at 11:14

## 2022-05-14 RX ADMIN — ATORVASTATIN CALCIUM 10 MILLIGRAM(S): 80 TABLET, FILM COATED ORAL at 21:46

## 2022-05-14 RX ADMIN — DOFETILIDE 250 MICROGRAM(S): 0.25 CAPSULE ORAL at 21:46

## 2022-05-14 NOTE — PROGRESS NOTE ADULT - SUBJECTIVE AND OBJECTIVE BOX
CC: Acute encephalopathy secondary to Septic arthritis  History of Present Illness:   Pt is an 86 yo male with a pmh/o Anemia, HLD, Afib, R Renal lesion, diverticulitis, 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   Pt endorsing weakness, chills, fatigue, non radiating, severe, throbbing, constant, not quantifiable on pain scale R shoulder pain which is exacerbated by pressure or movement and alleviated by ice/rest.   Denies sob, cough, chest pain, palpitations, nausea, vomiting, leg swelling, orthopnea, headache, abd pain, diarrhea, constipation, rash, paresthesias, changes in vision/hearing/speech/gait.   Of note, pt receives home care, home PT, and wound care for chronic venous stasis dermatitis which was recently treated for cellulitis. Pt arrived to ED in bilateral ACE wraps which were removed for evaluation.       5/13: Lying in bed, alert, mildly forgetful but comfortable, right shoulder tenderness.  Denies fever, chills, N, V, abd pain, CP, SOB.  5/14:  Confused, agitated, states he wants to go home.  Attempted to calm him down.  Spoke to daughter and updated her on current situation and plan of care for his shoulder infection.    REVIEW OF SYSTEMS: All other review of systems is negative unless indicated above.     Vital Signs Last 24 Hrs  T(C): 36.6 (14 May 2022 07:53), Max: 37.2 (13 May 2022 14:58)  T(F): 97.9 (14 May 2022 07:53), Max: 99 (13 May 2022 14:58)  HR: 81 (14 May 2022 07:53) (81 - 92)  BP: 113/68 (14 May 2022 07:53) (113/68 - 116/54)  BP(mean): --  RR: 17 (14 May 2022 07:53) (17 - 18)  SpO2: 99% (14 May 2022 07:53) (93% - 99%)      PHYSICAL EXAM:    Constitutional: NAD, awake and alert, confused  HEENT: PERR, EOMI, Normal Hearing, MMM  Neck: Soft and supple  Respiratory: Breath sounds are clear bilaterally, No wheezing, rales or rhonchi  Cardiovascular: S1 and S2, regular rate and rhythm, no Murmurs, gallops or rubs  Gastrointestinal: Bowel Sounds present, soft, nontender, nondistended, no guarding, no rebound  Extremities: No peripheral edema, right shoulder warm, erythematous, tender, and edematous   Neurological: A/O x 2, no focal deficits in my limited exam      MEDICATIONS  (STANDING):  atorvastatin 10 milliGRAM(s) Oral at bedtime  cefTRIAXone   IVPB 2000 milliGRAM(s) IV Intermittent every 24 hours  dofetilide 250 MICROGram(s) Oral two times a day  lidocaine   4% Patch 1 Patch Transdermal every 24 hours  timolol 0.25% Solution 1 Drop(s) Both EYES daily  vancomycin  IVPB 750 milliGRAM(s) IV Intermittent every 12 hours    MEDICATIONS  (PRN):  acetaminophen     Tablet .. 650 milliGRAM(s) Oral every 6 hours PRN Temp greater or equal to 38C (100.4F), Mild Pain (1 - 3)  ALBUTerol    90 MICROgram(s) HFA Inhaler 1 Puff(s) Inhalation every 6 hours PRN Shortness of Breath and/or Wheezing                                10.5   12.64 )-----------( 332      ( 14 May 2022 08:41 )             33.8     05-14    137  |  103  |  21  ----------------------------<  107<H>  4.0   |  28  |  0.66    Ca    8.5      14 May 2022 08:41    TPro  6.8  /  Alb  2.1<L>  /  TBili  0.5  /  DBili  x   /  AST  113<H>  /  ALT  64  /  AlkPhos  158<H>  05-14    CAPILLARY BLOOD GLUCOSE        LIVER FUNCTIONS - ( 14 May 2022 08:41 )  Alb: 2.1 g/dL / Pro: 6.8 gm/dL / ALK PHOS: 158 U/L / ALT: 64 U/L / AST: 113 U/L / GGT: x           PT/INR - ( 13 May 2022 07:57 )   PT: 20.1 sec;   INR: 1.72 ratio         PTT - ( 13 May 2022 07:57 )  PTT:36.4 sec          Assessment/Plan: 86 yo male with a pmh/o Anemia, HLD, Afib, R Renal lesion, diverticulitis, 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 Admitted due to:    #Metabolic encephalopathy and right should pain due to septic arthritis with staph aureus bacteremia.  - shoulder xray --> Right shoulder degeneration.  - IV ceftriaxone and vanco per ID  - Bcx: Staph Aureus  - s/p joint aspiration 1-2cc by IR 5/13  - joint fluid: Staph Aureus   - leukocytosis   - ortho following   - Pt declines to pursue MRI imaging for further evaluation, will attemp with CT imaging  - Pain control with tylenol and lidoderm patch  - PT   - Seroquel oral PRN agitation  - monitor transaminitis, could be due to infection    #Chronic anemia: STABLE      #Protein calorie malnutrition:  - nutrition consult     #HLD:  - c/w statin    #Paroxysmal Afib:  - HOLD xarelto for possible OR, restart pending CT imaging and recommendation from orthopedics  - c/w tikosyn    #chronic lower extremity Venous stasis dermatitis:  - no active extremity infection  - Wound care   - elevate extremities    DVT ppx:  - SCD  - xarelto on hold due to possible OR but first needs imaging studies.  - start lovenox subc until xarelto restarted      Assessment and Plan:   Nutritional Assessment:  · Nutritional Assessment	This patient has been assessed with a concern for Malnutrition and has been determined to have a diagnosis/diagnoses of Severe protein-calorie malnutrition.    This patient is being managed with:   Diet NPO after Midnight-     NPO Start Date: 12-May-2022   NPO Start Time: 23:59  Except Medications  Entered: May 12 2022  8:07PM       CC: Acute encephalopathy secondary to Septic arthritis  History of Present Illness:   Pt is an 88 yo male with a pmh/o Anemia, HLD, Afib, R Renal lesion, diverticulitis, 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   Pt endorsing weakness, chills, fatigue, non radiating, severe, throbbing, constant, not quantifiable on pain scale R shoulder pain which is exacerbated by pressure or movement and alleviated by ice/rest.   Denies sob, cough, chest pain, palpitations, nausea, vomiting, leg swelling, orthopnea, headache, abd pain, diarrhea, constipation, rash, paresthesias, changes in vision/hearing/speech/gait.   Of note, pt receives home care, home PT, and wound care for chronic venous stasis dermatitis which was recently treated for cellulitis. Pt arrived to ED in bilateral ACE wraps which were removed for evaluation.       5/13: Lying in bed, alert, mildly forgetful but comfortable, right shoulder tenderness.  Denies fever, chills, N, V, abd pain, CP, SOB.  5/14:  Confused, agitated, states he wants to go home.  Attempted to calm him down.  Spoke to daughter and updated her on current situation and plan of care for his shoulder infection.    REVIEW OF SYSTEMS: All other review of systems is negative unless indicated above.     Vital Signs Last 24 Hrs  T(C): 36.6 (14 May 2022 07:53), Max: 37.2 (13 May 2022 14:58)  T(F): 97.9 (14 May 2022 07:53), Max: 99 (13 May 2022 14:58)  HR: 81 (14 May 2022 07:53) (81 - 92)  BP: 113/68 (14 May 2022 07:53) (113/68 - 116/54)  BP(mean): --  RR: 17 (14 May 2022 07:53) (17 - 18)  SpO2: 99% (14 May 2022 07:53) (93% - 99%)      PHYSICAL EXAM:    Constitutional: NAD, awake and alert, confused  HEENT: PERR, EOMI, Normal Hearing, MMM  Neck: Soft and supple  Respiratory: Breath sounds are clear bilaterally, No wheezing, rales or rhonchi  Cardiovascular: S1 and S2, regular rate and rhythm, no Murmurs, gallops or rubs  Gastrointestinal: Bowel Sounds present, soft, nontender, nondistended, no guarding, no rebound  Extremities: No peripheral edema, right shoulder warm, erythematous, tender, and edematous   Neurological: A/O x 2, no focal deficits in my limited exam      MEDICATIONS  (STANDING):  atorvastatin 10 milliGRAM(s) Oral at bedtime  cefTRIAXone   IVPB 2000 milliGRAM(s) IV Intermittent every 24 hours  dofetilide 250 MICROGram(s) Oral two times a day  lidocaine   4% Patch 1 Patch Transdermal every 24 hours  timolol 0.25% Solution 1 Drop(s) Both EYES daily  vancomycin  IVPB 750 milliGRAM(s) IV Intermittent every 12 hours    MEDICATIONS  (PRN):  acetaminophen     Tablet .. 650 milliGRAM(s) Oral every 6 hours PRN Temp greater or equal to 38C (100.4F), Mild Pain (1 - 3)  ALBUTerol    90 MICROgram(s) HFA Inhaler 1 Puff(s) Inhalation every 6 hours PRN Shortness of Breath and/or Wheezing                                10.5   12.64 )-----------( 332      ( 14 May 2022 08:41 )             33.8     05-14    137  |  103  |  21  ----------------------------<  107<H>  4.0   |  28  |  0.66    Ca    8.5      14 May 2022 08:41    TPro  6.8  /  Alb  2.1<L>  /  TBili  0.5  /  DBili  x   /  AST  113<H>  /  ALT  64  /  AlkPhos  158<H>  05-14    CAPILLARY BLOOD GLUCOSE        LIVER FUNCTIONS - ( 14 May 2022 08:41 )  Alb: 2.1 g/dL / Pro: 6.8 gm/dL / ALK PHOS: 158 U/L / ALT: 64 U/L / AST: 113 U/L / GGT: x           PT/INR - ( 13 May 2022 07:57 )   PT: 20.1 sec;   INR: 1.72 ratio         PTT - ( 13 May 2022 07:57 )  PTT:36.4 sec          Assessment/Plan: 88 yo male with a pmh/o Anemia, HLD, Afib, R Renal lesion, diverticulitis, 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 Admitted due to:    #Metabolic encephalopathy and right should pain due to septic arthritis with staph aureus bacteremia.  - shoulder xray --> Right shoulder degeneration.  - IV ceftriaxone and vanco per ID  - Bcx: Staph Aureus  - s/p joint aspiration 1-2cc by IR 5/13  - joint fluid: Staph Aureus   - leukocytosis   - ortho following, if pt to go to OR he is low risk for intermediate risk procedure.  EKG shows afib, not in RVR, rate controlled.  Pt denies CP, SOB, comfortable.  EF from echo several years ago shows preserved EF.  Pt optimized and may proceed to surgery   - Pt declines to pursue MRI imaging for further evaluation, will attemp with CT imaging  - Pain control with tylenol and lidoderm patch  - PT   - Seroquel oral PRN agitation  - monitor transaminitis, could be due to infection    #Chronic anemia: STABLE      #Protein calorie malnutrition:  - nutrition consult     #HLD:  - c/w statin    #Paroxysmal Afib:  - HOLD xarelto for possible OR, restart pending CT imaging and recommendation from orthopedics  - c/w tikosyn    #chronic lower extremity Venous stasis dermatitis:  - no active extremity infection  - Wound care   - elevate extremities    DVT ppx:  - SCD  - xarelto on hold due to possible OR but first needs imaging studies.  - start lovenox subc until xarelto restarted      Assessment and Plan:   Nutritional Assessment:  · Nutritional Assessment	This patient has been assessed with a concern for Malnutrition and has been determined to have a diagnosis/diagnoses of Severe protein-calorie malnutrition.    This patient is being managed with:   Diet NPO after Midnight-     NPO Start Date: 12-May-2022   NPO Start Time: 23:59  Except Medications  Entered: May 12 2022  8:07PM

## 2022-05-14 NOTE — PROGRESS NOTE ADULT - SUBJECTIVE AND OBJECTIVE BOX
Patient seen and examined at bedside. Patient endorses right shoulder pain. Patient continues to refuse MRI R shoulder for further workup of possible infection. Patient denies any numbness, tingling, weakness, or any other orthopaedic complaint. Denies N/V/CP/SOB.       VITAL SIGNS:  T(C): 36.6 (05-14-22 @ 07:53), Max: 37.2 (05-13-22 @ 14:58)  HR: 81 (05-14-22 @ 07:53) (81 - 92)  BP: 113/68 (05-14-22 @ 07:53) (113/68 - 116/54)  RR: 17 (05-14-22 @ 07:53) (17 - 18)  SpO2: 99% (05-14-22 @ 07:53) (93% - 99%)      LABS:                        11.4   11.97 )-----------( 278      ( 13 May 2022 07:57 )             37.3     05-13    138  |  103  |  24<H>  ----------------------------<  109<H>  4.3   |  26  |  0.83    Ca    8.6      13 May 2022 07:57    TPro  6.9  /  Alb  2.2<L>  /  TBili  0.8  /  DBili  x   /  AST  74<H>  /  ALT  47  /  AlkPhos  163<H>  05-13    PT/INR - ( 13 May 2022 07:57 )   PT: 20.1 sec;   INR: 1.72 ratio         PTT - ( 13 May 2022 07:57 )  PTT:36.4 sec        Physical Exam  Gen: NAD, Alert and Awake, Follows Commands  R/UE: Skin intact with significant swelling and erythema overlying AC joint, diffuse mild swelling about the shoulder. TTP over AC joint. Cannot ROM shoulder due to pain. Pain with micromotion of shoulder. r/u/m/ain/pin function intact. SILT over deltoid. SILT digits 1-5, warm to touch. 2+ radial pulse. Compartments soft and compressible.     A/P: 87y Male with R shoulder pain x 1 week, concerning for septic AC joint   Pain control  appreciate medical management  Patient refuses MRI R shoulder  Bedside aspiration of R AC Joint was inconclusive  IR aspiration of AC joint (5/13) yielded low fluid volume, will follow up cultures  FU Blood cultures  Continue IV abx per primary team  wbat rue  Case discussed with Dr Gross who agrees with the plan above

## 2022-05-15 ENCOUNTER — TRANSCRIPTION ENCOUNTER (OUTPATIENT)
Age: 87
End: 2022-05-15

## 2022-05-15 ENCOUNTER — RESULT REVIEW (OUTPATIENT)
Age: 87
End: 2022-05-15

## 2022-05-15 LAB
-  AMPICILLIN/SULBACTAM: SIGNIFICANT CHANGE UP
-  AMPICILLIN/SULBACTAM: SIGNIFICANT CHANGE UP
-  CEFAZOLIN: SIGNIFICANT CHANGE UP
-  CEFAZOLIN: SIGNIFICANT CHANGE UP
-  CLINDAMYCIN: SIGNIFICANT CHANGE UP
-  CLINDAMYCIN: SIGNIFICANT CHANGE UP
-  ERYTHROMYCIN: SIGNIFICANT CHANGE UP
-  ERYTHROMYCIN: SIGNIFICANT CHANGE UP
-  GENTAMICIN: SIGNIFICANT CHANGE UP
-  GENTAMICIN: SIGNIFICANT CHANGE UP
-  OXACILLIN: SIGNIFICANT CHANGE UP
-  OXACILLIN: SIGNIFICANT CHANGE UP
-  PENICILLIN: SIGNIFICANT CHANGE UP
-  PENICILLIN: SIGNIFICANT CHANGE UP
-  RIFAMPIN: SIGNIFICANT CHANGE UP
-  RIFAMPIN: SIGNIFICANT CHANGE UP
-  TETRACYCLINE: SIGNIFICANT CHANGE UP
-  TETRACYCLINE: SIGNIFICANT CHANGE UP
-  TRIMETHOPRIM/SULFAMETHOXAZOLE: SIGNIFICANT CHANGE UP
-  TRIMETHOPRIM/SULFAMETHOXAZOLE: SIGNIFICANT CHANGE UP
-  VANCOMYCIN: SIGNIFICANT CHANGE UP
-  VANCOMYCIN: SIGNIFICANT CHANGE UP
ALBUMIN SERPL ELPH-MCNC: 2 G/DL — LOW (ref 3.3–5)
ALP SERPL-CCNC: 185 U/L — HIGH (ref 40–120)
ALT FLD-CCNC: 90 U/L — HIGH (ref 12–78)
ANION GAP SERPL CALC-SCNC: 6 MMOL/L — SIGNIFICANT CHANGE UP (ref 5–17)
APTT BLD: 35.7 SEC — HIGH (ref 27.5–35.5)
AST SERPL-CCNC: 186 U/L — HIGH (ref 15–37)
BILIRUB SERPL-MCNC: 0.6 MG/DL — SIGNIFICANT CHANGE UP (ref 0.2–1.2)
BUN SERPL-MCNC: 13 MG/DL — SIGNIFICANT CHANGE UP (ref 7–23)
CALCIUM SERPL-MCNC: 8.6 MG/DL — SIGNIFICANT CHANGE UP (ref 8.5–10.1)
CHLORIDE SERPL-SCNC: 101 MMOL/L — SIGNIFICANT CHANGE UP (ref 96–108)
CO2 SERPL-SCNC: 29 MMOL/L — SIGNIFICANT CHANGE UP (ref 22–31)
CREAT SERPL-MCNC: 0.56 MG/DL — SIGNIFICANT CHANGE UP (ref 0.5–1.3)
CULTURE RESULTS: SIGNIFICANT CHANGE UP
CULTURE RESULTS: SIGNIFICANT CHANGE UP
EGFR: 95 ML/MIN/1.73M2 — SIGNIFICANT CHANGE UP
GLUCOSE SERPL-MCNC: 105 MG/DL — HIGH (ref 70–99)
HCT VFR BLD CALC: 34 % — LOW (ref 39–50)
HGB BLD-MCNC: 11 G/DL — LOW (ref 13–17)
INR BLD: 1.45 RATIO — HIGH (ref 0.88–1.16)
MCHC RBC-ENTMCNC: 29.2 PG — SIGNIFICANT CHANGE UP (ref 27–34)
MCHC RBC-ENTMCNC: 32.4 GM/DL — SIGNIFICANT CHANGE UP (ref 32–36)
MCV RBC AUTO: 90.2 FL — SIGNIFICANT CHANGE UP (ref 80–100)
METHOD TYPE: SIGNIFICANT CHANGE UP
METHOD TYPE: SIGNIFICANT CHANGE UP
ORGANISM # SPEC MICROSCOPIC CNT: SIGNIFICANT CHANGE UP
PLATELET # BLD AUTO: 397 K/UL — SIGNIFICANT CHANGE UP (ref 150–400)
POTASSIUM SERPL-MCNC: 4.1 MMOL/L — SIGNIFICANT CHANGE UP (ref 3.5–5.3)
POTASSIUM SERPL-SCNC: 4.1 MMOL/L — SIGNIFICANT CHANGE UP (ref 3.5–5.3)
PROT SERPL-MCNC: 6.8 GM/DL — SIGNIFICANT CHANGE UP (ref 6–8.3)
PROTHROM AB SERPL-ACNC: 16.9 SEC — HIGH (ref 10.5–13.4)
RBC # BLD: 3.77 M/UL — LOW (ref 4.2–5.8)
RBC # FLD: 14.8 % — HIGH (ref 10.3–14.5)
SODIUM SERPL-SCNC: 136 MMOL/L — SIGNIFICANT CHANGE UP (ref 135–145)
SPECIMEN SOURCE: SIGNIFICANT CHANGE UP
SPECIMEN SOURCE: SIGNIFICANT CHANGE UP
WBC # BLD: 12.02 K/UL — HIGH (ref 3.8–10.5)
WBC # FLD AUTO: 12.02 K/UL — HIGH (ref 3.8–10.5)

## 2022-05-15 PROCEDURE — 88304 TISSUE EXAM BY PATHOLOGIST: CPT | Mod: 26

## 2022-05-15 PROCEDURE — 99233 SBSQ HOSP IP/OBS HIGH 50: CPT

## 2022-05-15 RX ORDER — OXYCODONE HYDROCHLORIDE 5 MG/1
5 TABLET ORAL EVERY 4 HOURS
Refills: 0 | Status: DISCONTINUED | OUTPATIENT
Start: 2022-05-15 | End: 2022-05-20

## 2022-05-15 RX ORDER — APIXABAN 2.5 MG/1
5 TABLET, FILM COATED ORAL
Refills: 0 | Status: DISCONTINUED | OUTPATIENT
Start: 2022-05-16 | End: 2022-05-20

## 2022-05-15 RX ORDER — FENTANYL CITRATE 50 UG/ML
50 INJECTION INTRAVENOUS
Refills: 0 | Status: DISCONTINUED | OUTPATIENT
Start: 2022-05-15 | End: 2022-05-15

## 2022-05-15 RX ORDER — ONDANSETRON 8 MG/1
4 TABLET, FILM COATED ORAL ONCE
Refills: 0 | Status: DISCONTINUED | OUTPATIENT
Start: 2022-05-15 | End: 2022-05-15

## 2022-05-15 RX ORDER — OXYCODONE HYDROCHLORIDE 5 MG/1
2.5 TABLET ORAL EVERY 4 HOURS
Refills: 0 | Status: DISCONTINUED | OUTPATIENT
Start: 2022-05-15 | End: 2022-05-20

## 2022-05-15 RX ORDER — SODIUM CHLORIDE 9 MG/ML
1000 INJECTION, SOLUTION INTRAVENOUS
Refills: 0 | Status: DISCONTINUED | OUTPATIENT
Start: 2022-05-15 | End: 2022-05-15

## 2022-05-15 RX ADMIN — DOFETILIDE 250 MICROGRAM(S): 0.25 CAPSULE ORAL at 21:26

## 2022-05-15 RX ADMIN — Medication 1 DROP(S): at 13:29

## 2022-05-15 RX ADMIN — Medication 250 MILLIGRAM(S): at 21:26

## 2022-05-15 RX ADMIN — SODIUM CHLORIDE 75 MILLILITER(S): 9 INJECTION, SOLUTION INTRAVENOUS at 05:08

## 2022-05-15 RX ADMIN — SODIUM CHLORIDE 75 MILLILITER(S): 9 INJECTION, SOLUTION INTRAVENOUS at 00:14

## 2022-05-15 RX ADMIN — CEFTRIAXONE 100 MILLIGRAM(S): 500 INJECTION, POWDER, FOR SOLUTION INTRAMUSCULAR; INTRAVENOUS at 13:28

## 2022-05-15 RX ADMIN — Medication 250 MILLIGRAM(S): at 14:09

## 2022-05-15 RX ADMIN — ATORVASTATIN CALCIUM 10 MILLIGRAM(S): 80 TABLET, FILM COATED ORAL at 21:26

## 2022-05-15 NOTE — DISCHARGE NOTE PROVIDER - NSDCMRMEDTOKEN_GEN_ALL_CORE_FT
Albuterol (Eqv-Proventil HFA) 90 mcg/inh inhalation aerosol: 1 puff(s) inhaled every 6 hours, As Needed  apixaban 5 mg oral tablet: 1 tab(s) orally 2 times a day  atorvastatin 10 mg oral tablet: 1 tab(s) orally once a day  dofetilide 250 mcg oral capsule: 1 cap(s) orally 2 times a day  lidocaine 4% topical film: Apply topically to affected area every 24 hours   timolol maleate 0.25% ophthalmic solution: 1 drop(s) to each affected eye once a day  Tylenol 325 mg oral tablet: 2 tab(s) orally every 4 hours, As Needed   Albuterol (Eqv-Proventil HFA) 90 mcg/inh inhalation aerosol: 1 puff(s) inhaled every 6 hours, As Needed  apixaban 5 mg oral tablet: 1 tab(s) orally 2 times a day  atorvastatin 10 mg oral tablet: 1 tab(s) orally once a day  ceFAZolin: 2 gram(s) intravenous every 8 hours  until 06/25/22  dofetilide 250 mcg oral capsule: 1 cap(s) orally 2 times a day  lidocaine 4% topical film: Apply topically to affected area every 24 hours   oxyCODONE 5 mg oral tablet: 1 tab(s) orally every 4 hours, As needed, Severe Pain (7 - 10)  timolol maleate 0.25% ophthalmic solution: 1 drop(s) to each affected eye once a day  Tylenol 325 mg oral tablet: 2 tab(s) orally every 4 hours, As Needed

## 2022-05-15 NOTE — DISCHARGE NOTE PROVIDER - NSDCCPCAREPLAN_GEN_ALL_CORE_FT
PRINCIPAL DISCHARGE DIAGNOSIS  Diagnosis: Septic joint  Assessment and Plan of Treatment: Follow up with ortho once discharge from rehab

## 2022-05-15 NOTE — BRIEF OPERATIVE NOTE - OPERATION/FINDINGS
Scant 1-2cc join fluid removed, right shoulder, using US and Fluoro guidance
R Shoulder Arthroscopic Irrigation and Debridement  Arthroscopic I&D of R AC Joint  Subacromial Decompression, w/ Keerthi Procedure

## 2022-05-15 NOTE — DISCHARGE NOTE PROVIDER - CARE PROVIDER_API CALL
Lora Aguirre)  Orthopaedic Surgery; Surgery of the Hand  [address] [place] [address]  Phone: [phone]  Fax: [phone]  Follow Up Time:

## 2022-05-15 NOTE — PROGRESS NOTE ADULT - SUBJECTIVE AND OBJECTIVE BOX
Patient seen and examined at bedside. Patient endorses right shoulder pain. Patient continues to refuse MRI R shoulder for further workup of possible infection. Patient denies any numbness, tingling, weakness, or any other orthopaedic complaint. Denies N/V/CP/SOB.       Vital Signs Last 24 Hrs  T(C): 37 (15 May 2022 00:51), Max: 37.2 (14 May 2022 16:44)  T(F): 98.6 (15 May 2022 00:51), Max: 98.9 (14 May 2022 16:44)  HR: 88 (15 May 2022 00:51) (85 - 88)  BP: 131/84 (15 May 2022 00:51) (131/84 - 143/74)  BP(mean): --  RR: 18 (15 May 2022 00:51) (18 - 18)  SpO2: 96% (15 May 2022 00:51) (96% - 98%)        Physical Exam  Gen: NAD, Alert and Awake, Follows Commands  R/UE: Skin intact with significant swelling and erythema overlying AC joint, diffuse mild swelling about the shoulder. TTP over AC joint. Cannot ROM shoulder due to pain. Pain with micromotion of shoulder. r/u/m/ain/pin function intact. SILT over deltoid. SILT digits 1-5, warm to touch. 2+ radial pulse. Compartments soft and compressible.     A/P: 87y Male with R shoulder septic AC joint   Pain control  appreciate medical management  Patient refuses MRI R shoulder  Bedside aspiration of R AC Joint growing Staph aureus  IR aspiration of AC joint (5/13) yielded low fluid volume, growing staph aureus  Blood cultures growing staph aureus  Continue IV abx per primary team  Plan for OR today to ID   NPO & Hold DVT ppx  wbat rusteven Aguirre who agrees with the plan above

## 2022-05-15 NOTE — DISCHARGE NOTE PROVIDER - HOSPITAL COURSE
Pt is an 86 yo male with a pmh/o Anemia, HLD, Afib, R Renal lesion, diverticulitis, 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   Pt endorsing weakness, chills, fatigue, non radiating, severe, throbbing, constant, not quantifiable on pain scale R shoulder pain which is exacerbated by pressure or movement and alleviated by ice/rest.   Denies sob, cough, chest pain, palpitations, nausea, vomiting, leg swelling, orthopnea, headache, abd pain, diarrhea, constipation, rash, paresthesias, changes in vision/hearing/speech/gait.   Of note, pt receives home care, home PT, and wound care for chronic venous stasis dermatitis which was recently treated for cellulitis. Pt arrived to ED in bilateral ACE wraps which were removed for evaluation.       He was admitted to medical floor. He was started on IV rocephin and vancomycin. He was closely followed by ID and ortho. S/P right shoulder aspiration by IR on 05/13/22. Cultures growing staph aureus.   His blood cultures were growing staph aureus-MSSA. His IV antibiotic then changed to IV rocephin. He was seen by PT and rehab was recommended. Discussed with wife multiple times and agreeable to go to rehab.       Vital Signs Last 24 Hrs  T(C): 36.9 (20 May 2022 07:27), Max: 36.9 (20 May 2022 07:27)  T(F): 98.5 (20 May 2022 07:27), Max: 98.5 (20 May 2022 07:27)  HR: 72 (20 May 2022 07:27) (72 - 89)  BP: 104/45 (20 May 2022 07:27) (104/45 - 118/73)  BP(mean): --  RR: 18 (20 May 2022 07:27) (18 - 18)  SpO2: 98% (20 May 2022 07:27) (96% - 98%)          PHYSICAL EXAM:    Constitutional: NAD, awake and alert, confused  HEENT: PERR, EOMI, Normal Hearing, MMM  Neck: Soft and supple  Respiratory: Breath sounds are clear bilaterally, No wheezing, rales or rhonchi  Cardiovascular: S1 and S2, regular rate and rhythm, no Murmurs, gallops or rubs  Gastrointestinal: Bowel Sounds present, soft, nontender, nondistended, no guarding, no rebound  Extremities: No peripheral edema, right shoulder warm, erythematous, tender, and edematous   Neurological: A/O x 2, no focal deficits in my limited exam      #Metabolic encephalopathy-resolved  right should pain due to septic arthritis with staph aureus bacteremia.  - Continue IV ancef until 06/25/22  - Bcx: Staph Aureus  - s/p joint aspiration x 2, most recent 1-2cc removed by IR on 5/13  - joint fluid: Staph Aureus   - ortho following, s/p I+D on 5/15  - Echocardiogram: no vegetations  - Pain control with tylenol and lidoderm patch  - Continue PT     #Chronic anemia: STABLE    #Severe Protein calorie malnutrition:  - nutrition consult appreciated    #HLD:  - c/w statin    #Paroxysmal Afib:  - xarelto  - c/w tikosyn    #chronic lower extremity Venous stasis dermatitis:  Outpatient follow up    D/C to rehab once bed is available  Spent more than 30 min to prepare the discharge

## 2022-05-15 NOTE — DISCHARGE NOTE PROVIDER - DETAILS OF MALNUTRITION DIAGNOSIS/DIAGNOSES
This patient has been assessed with a concern for Malnutrition and was treated during this hospitalization for the following Nutrition diagnosis/diagnoses:     -  05/13/2022: Severe protein-calorie malnutrition

## 2022-05-15 NOTE — DISCHARGE NOTE PROVIDER - NSDCFUADDINST_GEN_ALL_CORE_FT
Shoulder Arthroscopy Instructions    1) PAIN & SWELLING: Your shoulder may swell over the next 48hours and pain may get a bit worse. Ice it plenty, continuously if possible. Fill up a plastic bag, then wrap it in a towel or pillow case and lay it on your shoulder.     2) ACTIVITY: Elevate your hand as much you can in a "pledge of allegiance" fashion, and wiggle your fingers as often. Otherwise you should be up and about, walking as much as you can tolerate. The more you move the better you will do. No weight bearing on the arm yet.     3) BANDAGE: Expect some drainage onto the bandage. It is normal. It may even soak through and look bloody. This is mostly leftover Saline fluid coming out from surgery. Even a drop of blood can make it look very bloody. Just place another Gauze over it. If it bleeds through the second bandage again, call.    4) SHOWER: You can shower in 72 hours. No bath, no pool, no hot tub. Pat your incisions dry. No creams, no lotions.    5) ISSUES: Only reason to worry would be if pain got so severe that you cannot feel or wiggle your fingers. In this case you need to call or come to the ER. But as long as you can feel or wiggle your fingers, you are fine.    6) FOLLOW UP: Call Dr. Aguirre's office to schedule a follow up appointment to be seen in 14 days. Your Sutures will be removed at that point.    7) HOME MEDICATIONS: resume your home medications as you normally do.

## 2022-05-15 NOTE — PROGRESS NOTE ADULT - SUBJECTIVE AND OBJECTIVE BOX
Postop Check    Patient tolerated the procedure well. Patient seen and examined at bedside. No acute complaints at this time. Pain well controlled. Denies chest pain, shortness of breath, nausea or vomiting.     PE:  Vital Signs Last 24 Hrs  T(C): 36.4 (05-15-22 @ 11:35), Max: 37.3 (05-15-22 @ 08:32)  T(F): 97.5 (05-15-22 @ 11:35), Max: 99.2 (05-15-22 @ 08:32)  HR: 89 (05-15-22 @ 12:15) (87 - 93)  BP: 128/62 (05-15-22 @ 12:00) (109/53 - 131/84)  BP(mean): --  RR: 14 (05-15-22 @ 12:15) (14 - 18)  SpO2: 99% (05-15-22 @ 12:15) (96% - 99%)    General: NAD, resting comfortably in bed  RUE:   Dressing C/D/I  Drain intact  Compartments soft and compressible  No calf tenderness bilaterally  +Axillary/Musculocutaneous/Radial/Median/AIN/PIN intact  SILT C5-T1  2+ radial pulses                          11.0   12.02 )-----------( 397      ( 15 May 2022 07:41 )             34.0     15 May 2022 07:41    136    |  101    |  13     ----------------------------<  105    4.1     |  29     |  0.56     Ca    8.6        15 May 2022 07:41    TPro  6.8    /  Alb  2.0    /  TBili  0.6    /  DBili  x      /  AST  186    /  ALT  90     /  AlkPhos  185    15 May 2022 07:41    PT/INR - ( 15 May 2022 07:41 )   PT: 16.9 sec;   INR: 1.45 ratio         PTT - ( 15 May 2022 07:41 )  PTT:35.7 sec    A/P:  87y m s/p R GH and AC Joint I&D POD 0  -PT/OT -WBAT RUE  -Pain Control  -DVT ppx per medical team  -Continue perioperative abx x 24 hours  -FU AM Labs  -Rest, ice, compress and elevate the extremity as we needed  -Incentive Spirometry  -Medical management appreciated  -FU TTE  -FU OR Cx  -Aspiration and blood cultures growing staph MSSA  -FU ID regarding Abx recs likely needs long term abx

## 2022-05-15 NOTE — PROGRESS NOTE ADULT - SUBJECTIVE AND OBJECTIVE BOX
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   Pt endorsing weakness, chills, fatigue, non radiating, severe, throbbing, constant, not quantifiable on pain scale R shoulder pain which is exacerbated by pressure or movement and alleviated by ice/rest.   Denies sob, cough, chest pain, palpitations, nausea, vomiting, leg swelling, orthopnea, headache, abd pain, diarrhea, constipation, rash, paresthesias, changes in vision/hearing/speech/gait.   Of note, pt receives home care, home PT, and wound care for chronic venous stasis dermatitis which was recently treated for cellulitis. Pt arrived to ED in bilateral ACE wraps which were removed for evaluation.       5/13: Lying in bed, alert, mildly forgetful but comfortable, right shoulder tenderness.  Denies fever, chills, N, V, abd pain, CP, SOB.  5/14:  Confused, agitated, states he wants to go home.  Attempted to calm him down.  Spoke to daughter and updated her on current situation and plan of care for his shoulder infection.  5/15: Confused at times, alert otherwise, comfortable.  Found to have staph bacteremia, family aware.    REVIEW OF SYSTEMS: All other review of systems is negative unless indicated above.     Vital Signs Last 24 Hrs  T(C): 36.4 (15 May 2022 11:35), Max: 37.3 (15 May 2022 08:32)  T(F): 97.5 (15 May 2022 11:35), Max: 99.2 (15 May 2022 08:32)  HR: 89 (15 May 2022 12:15) (85 - 93)  BP: 128/62 (15 May 2022 12:00) (109/53 - 143/74)  BP(mean): --  RR: 14 (15 May 2022 12:15) (14 - 18)  SpO2: 99% (15 May 2022 12:15) (96% - 99%)      PHYSICAL EXAM:    Constitutional: NAD, awake and alert, confused  HEENT: PERR, EOMI, Normal Hearing, MMM  Neck: Soft and supple  Respiratory: Breath sounds are clear bilaterally, No wheezing, rales or rhonchi  Cardiovascular: S1 and S2, regular rate and rhythm, no Murmurs, gallops or rubs  Gastrointestinal: Bowel Sounds present, soft, nontender, nondistended, no guarding, no rebound  Extremities: No peripheral edema, right shoulder warm, erythematous, tender, and edematous   Neurological: A/O x 2, no focal deficits in my limited exam    MEDICATIONS  (STANDING):  atorvastatin 10 milliGRAM(s) Oral at bedtime  cefTRIAXone   IVPB 2000 milliGRAM(s) IV Intermittent every 24 hours  dofetilide 250 MICROGram(s) Oral two times a day  lactated ringers. 1000 milliLiter(s) (75 mL/Hr) IV Continuous <Continuous>  lidocaine   4% Patch 1 Patch Transdermal every 24 hours  timolol 0.25% Solution 1 Drop(s) Both EYES daily  vancomycin  IVPB 750 milliGRAM(s) IV Intermittent every 12 hours    MEDICATIONS  (PRN):  acetaminophen     Tablet .. 650 milliGRAM(s) Oral every 6 hours PRN Temp greater or equal to 38C (100.4F), Mild Pain (1 - 3)  ALBUTerol    90 MICROgram(s) HFA Inhaler 1 Puff(s) Inhalation every 6 hours PRN Shortness of Breath and/or Wheezing  oxyCODONE    IR 2.5 milliGRAM(s) Oral every 4 hours PRN Moderate Pain (4 - 6)  oxyCODONE    IR 5 milliGRAM(s) Oral every 4 hours PRN Severe Pain (7 - 10)  QUEtiapine 25 milliGRAM(s) Oral two times a day PRN agitation                              11.0   12.02 )-----------( 397      ( 15 May 2022 07:41 )             34.0     05-15    136  |  101  |  13  ----------------------------<  105<H>  4.1   |  29  |  0.56    Ca    8.6      15 May 2022 07:41    TPro  6.8  /  Alb  2.0<L>  /  TBili  0.6  /  DBili  x   /  AST  186<H>  /  ALT  90<H>  /  AlkPhos  185<H>  05-15    CAPILLARY BLOOD GLUCOSE        LIVER FUNCTIONS - ( 15 May 2022 07:41 )  Alb: 2.0 g/dL / Pro: 6.8 gm/dL / ALK PHOS: 185 U/L / ALT: 90 U/L / AST: 186 U/L / GGT: x           PT/INR - ( 15 May 2022 07:41 )   PT: 16.9 sec;   INR: 1.45 ratio         PTT - ( 15 May 2022 07:41 )  PTT:35.7 sec          Assessment/Plan: 88 yo male with a pmh/o Anemia, HLD, Afib, R Renal lesion, diverticulitis, 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 Admitted due to:    #Metabolic encephalopathy and right should pain due to septic arthritis with staph aureus bacteremia.  - shoulder xray --> Right shoulder degeneration.  - IV ceftriaxone and vanco per ID  - Bcx: Staph Aureus  - s/p joint aspiration x 2, most recent 1-2cc removed by IR on 5/13  - joint fluid: Staph Aureus   - leukocytosis   - ortho following, s/p I+D on 5/15  - Echocardiogram pending   - Pt declines further imaging of shoulder  - Pain control with tylenol and lidoderm patch  - PT   - Seroquel oral PRN agitation  - monitor transaminitis, could be due to infection    #Chronic anemia: STABLE      #Protein calorie malnutrition:  - nutrition consult     #HLD:  - c/w statin    #Paroxysmal Afib:  - xarelto  - c/w tikosyn    #chronic lower extremity Venous stasis dermatitis:  - no active extremity infection  - Wound care   - elevate extremities    DVT ppx:  - xarelto    Assessment and Plan:   Nutritional Assessment:  · Nutritional Assessment	This patient has been assessed with a concern for Malnutrition and has been determined to have a diagnosis/diagnoses of Severe protein-calorie malnutrition.    This patient is being managed with:   Diet NPO after Midnight-     NPO Start Date: 12-May-2022   NPO Start Time: 23:59  Except Medications  Entered: May 12 2022  8:07PM

## 2022-05-16 LAB
ALBUMIN SERPL ELPH-MCNC: 1.9 G/DL — LOW (ref 3.3–5)
ALP SERPL-CCNC: 192 U/L — HIGH (ref 40–120)
ALT FLD-CCNC: 85 U/L — HIGH (ref 12–78)
ANION GAP SERPL CALC-SCNC: 6 MMOL/L — SIGNIFICANT CHANGE UP (ref 5–17)
AST SERPL-CCNC: 131 U/L — HIGH (ref 15–37)
BILIRUB SERPL-MCNC: 0.4 MG/DL — SIGNIFICANT CHANGE UP (ref 0.2–1.2)
BUN SERPL-MCNC: 18 MG/DL — SIGNIFICANT CHANGE UP (ref 7–23)
CALCIUM SERPL-MCNC: 8.5 MG/DL — SIGNIFICANT CHANGE UP (ref 8.5–10.1)
CHLORIDE SERPL-SCNC: 103 MMOL/L — SIGNIFICANT CHANGE UP (ref 96–108)
CO2 SERPL-SCNC: 28 MMOL/L — SIGNIFICANT CHANGE UP (ref 22–31)
CREAT SERPL-MCNC: 0.62 MG/DL — SIGNIFICANT CHANGE UP (ref 0.5–1.3)
CRP SERPL-MCNC: 129 MG/L — HIGH
EGFR: 93 ML/MIN/1.73M2 — SIGNIFICANT CHANGE UP
GLUCOSE SERPL-MCNC: 133 MG/DL — HIGH (ref 70–99)
HCT VFR BLD CALC: 34.8 % — LOW (ref 39–50)
HGB BLD-MCNC: 10.9 G/DL — LOW (ref 13–17)
MCHC RBC-ENTMCNC: 28.8 PG — SIGNIFICANT CHANGE UP (ref 27–34)
MCHC RBC-ENTMCNC: 31.3 GM/DL — LOW (ref 32–36)
MCV RBC AUTO: 91.8 FL — SIGNIFICANT CHANGE UP (ref 80–100)
PLATELET # BLD AUTO: 450 K/UL — HIGH (ref 150–400)
POTASSIUM SERPL-MCNC: 5 MMOL/L — SIGNIFICANT CHANGE UP (ref 3.5–5.3)
POTASSIUM SERPL-SCNC: 5 MMOL/L — SIGNIFICANT CHANGE UP (ref 3.5–5.3)
PROT SERPL-MCNC: 6.7 GM/DL — SIGNIFICANT CHANGE UP (ref 6–8.3)
RBC # BLD: 3.79 M/UL — LOW (ref 4.2–5.8)
RBC # FLD: 14.9 % — HIGH (ref 10.3–14.5)
SODIUM SERPL-SCNC: 137 MMOL/L — SIGNIFICANT CHANGE UP (ref 135–145)
WBC # BLD: 13.06 K/UL — HIGH (ref 3.8–10.5)
WBC # FLD AUTO: 13.06 K/UL — HIGH (ref 3.8–10.5)

## 2022-05-16 PROCEDURE — 93306 TTE W/DOPPLER COMPLETE: CPT | Mod: 26

## 2022-05-16 PROCEDURE — 99233 SBSQ HOSP IP/OBS HIGH 50: CPT

## 2022-05-16 RX ORDER — CEFAZOLIN SODIUM 1 G
2000 VIAL (EA) INJECTION EVERY 8 HOURS
Refills: 0 | Status: DISCONTINUED | OUTPATIENT
Start: 2022-05-16 | End: 2022-05-20

## 2022-05-16 RX ADMIN — DOFETILIDE 250 MICROGRAM(S): 0.25 CAPSULE ORAL at 11:28

## 2022-05-16 RX ADMIN — APIXABAN 5 MILLIGRAM(S): 2.5 TABLET, FILM COATED ORAL at 22:11

## 2022-05-16 RX ADMIN — Medication 250 MILLIGRAM(S): at 09:43

## 2022-05-16 RX ADMIN — Medication 100 MILLIGRAM(S): at 22:09

## 2022-05-16 RX ADMIN — SODIUM CHLORIDE 75 MILLILITER(S): 9 INJECTION, SOLUTION INTRAVENOUS at 04:52

## 2022-05-16 RX ADMIN — ATORVASTATIN CALCIUM 10 MILLIGRAM(S): 80 TABLET, FILM COATED ORAL at 22:11

## 2022-05-16 RX ADMIN — Medication 1 DROP(S): at 09:39

## 2022-05-16 RX ADMIN — CEFTRIAXONE 100 MILLIGRAM(S): 500 INJECTION, POWDER, FOR SOLUTION INTRAMUSCULAR; INTRAVENOUS at 09:37

## 2022-05-16 RX ADMIN — APIXABAN 5 MILLIGRAM(S): 2.5 TABLET, FILM COATED ORAL at 09:39

## 2022-05-16 RX ADMIN — Medication 100 MILLIGRAM(S): at 15:16

## 2022-05-16 RX ADMIN — DOFETILIDE 250 MICROGRAM(S): 0.25 CAPSULE ORAL at 22:11

## 2022-05-16 NOTE — PHARMACOTHERAPY INTERVENTION NOTE - COMMENTS
Recommended to draw vancomycin level tonight 5/16 prior to 4th dose. Last vancomycin level 10.1 drawn night of 5/14.

## 2022-05-16 NOTE — PROVIDER CONTACT NOTE (CRITICAL VALUE NOTIFICATION) - TEST AND RESULT REPORTED:
prel bloo cx growth in aerobic bottle, gram pos cocci in clusters
Preliminary result 5/15/22  Subacromial space rare staph aureus  Acromioclavicular joint few staph aureus

## 2022-05-16 NOTE — PROGRESS NOTE ADULT - SUBJECTIVE AND OBJECTIVE BOX
Patient seen and examined at bedside. No acute complaints at this time. No acute events overnight. Pain well controlled. Denies chest pain, shortness of breath, nausea or vomiting.     PE:  Vital Signs Last 24 Hrs  T(C): 36.6 (16 May 2022 07:19), Max: 37.1 (16 May 2022 01:00)  T(F): 97.8 (16 May 2022 07:19), Max: 98.7 (16 May 2022 01:00)  HR: 74 (16 May 2022 07:19) (74 - 90)  BP: 123/74 (16 May 2022 07:19) (109/53 - 128/62)  BP(mean): --  RR: 18 (16 May 2022 07:19) (14 - 18)  SpO2: 98% (16 May 2022 07:19) (96% - 99%)    General: NAD, resting comfortably in bed  RUE:   Dressing C/D/I  Drain intact  Compartments soft and compressible  No calf tenderness bilaterally  +Axillary/Musculocutaneous/Radial/Median/AIN/PIN intact  SILT C5-T1  2+ radial pulses      A/P:  87y m s/p R GH and AC Joint I&D POD 1  -PT/OT -WBAT RUE  -Pain Control  -DVT ppx per medical team  -Antibiotics per ID  -FU AM Labs  -Rest, ice, compress and elevate the extremity as we needed  -Incentive Spirometry  -Medical management appreciated  -FU OR Cx  -Aspiration and blood cultures growing staph MSSA

## 2022-05-16 NOTE — PROGRESS NOTE ADULT - SUBJECTIVE AND OBJECTIVE BOX
CC: Acute encephalopathy secondary to Septic arthritis  History of Present Illness:   Pt is an 86 yo male with a pmh/o Anemia, HLD, Afib, R Renal lesion, diverticulitis, 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   Pt endorsing weakness, chills, fatigue, non radiating, severe, throbbing, constant, not quantifiable on pain scale R shoulder pain which is exacerbated by pressure or movement and alleviated by ice/rest.   Denies sob, cough, chest pain, palpitations, nausea, vomiting, leg swelling, orthopnea, headache, abd pain, diarrhea, constipation, rash, paresthesias, changes in vision/hearing/speech/gait.   Of note, pt receives home care, home PT, and wound care for chronic venous stasis dermatitis which was recently treated for cellulitis. Pt arrived to ED in bilateral ACE wraps which were removed for evaluation.       5/13: Lying in bed, alert, mildly forgetful but comfortable, right shoulder tenderness.  Denies fever, chills, N, V, abd pain, CP, SOB.  5/14:  Confused, agitated, states he wants to go home.  Attempted to calm him down.  Spoke to daughter and updated her on current situation and plan of care for his shoulder infection.  5/15: Confused at times, alert otherwise, comfortable.  Found to have staph bacteremia, family aware.  05/16/22: Patient seen and examined. Denies any new complaints. Discussed with patient regarding management and d/c plan. Discussed with Dr Dowd.     REVIEW OF SYSTEMS: All other review of systems is negative unless indicated above.     Vital Signs Last 24 Hrs  T(C): 36.6 (16 May 2022 07:19), Max: 37.1 (16 May 2022 01:00)  T(F): 97.8 (16 May 2022 07:19), Max: 98.7 (16 May 2022 01:00)  HR: 74 (16 May 2022 07:19) (74 - 90)  BP: 123/74 (16 May 2022 07:19) (115/75 - 123/74)  BP(mean): --  RR: 18 (16 May 2022 07:19) (17 - 18)  SpO2: 98% (16 May 2022 07:19) (96% - 98%)      PHYSICAL EXAM:    Constitutional: NAD, awake and alert, confused  HEENT: PERR, EOMI, Normal Hearing, MMM  Neck: Soft and supple  Respiratory: Breath sounds are clear bilaterally, No wheezing, rales or rhonchi  Cardiovascular: S1 and S2, regular rate and rhythm, no Murmurs, gallops or rubs  Gastrointestinal: Bowel Sounds present, soft, nontender, nondistended, no guarding, no rebound  Extremities: No peripheral edema, right shoulder warm, erythematous, tender, and edematous   Neurological: A/O x 2, no focal deficits in my limited exam    MEDICATIONS  (STANDING):  atorvastatin 10 milliGRAM(s) Oral at bedtime  cefTRIAXone   IVPB 2000 milliGRAM(s) IV Intermittent every 24 hours  dofetilide 250 MICROGram(s) Oral two times a day  lactated ringers. 1000 milliLiter(s) (75 mL/Hr) IV Continuous <Continuous>  lidocaine   4% Patch 1 Patch Transdermal every 24 hours  timolol 0.25% Solution 1 Drop(s) Both EYES daily  vancomycin  IVPB 750 milliGRAM(s) IV Intermittent every 12 hours    MEDICATIONS  (PRN):  acetaminophen     Tablet .. 650 milliGRAM(s) Oral every 6 hours PRN Temp greater or equal to 38C (100.4F), Mild Pain (1 - 3)  ALBUTerol    90 MICROgram(s) HFA Inhaler 1 Puff(s) Inhalation every 6 hours PRN Shortness of Breath and/or Wheezing  oxyCODONE    IR 2.5 milliGRAM(s) Oral every 4 hours PRN Moderate Pain (4 - 6)  oxyCODONE    IR 5 milliGRAM(s) Oral every 4 hours PRN Severe Pain (7 - 10)  QUEtiapine 25 milliGRAM(s) Oral two times a day PRN agitation                                               10.9   13.06 )-----------( 450      ( 16 May 2022 08:41 )             34.8     16 May 2022 08:41    137    |  103    |  18     ----------------------------<  133    5.0     |  28     |  0.62     Ca    8.5        16 May 2022 08:41    TPro  6.7    /  Alb  1.9    /  TBili  0.4    /  DBili  x      /  AST  131    /  ALT  85     /  AlkPhos  192    16 May 2022 08:41    LIVER FUNCTIONS - ( 16 May 2022 08:41 )  Alb: 1.9 g/dL / Pro: 6.7 gm/dL / ALK PHOS: 192 U/L / ALT: 85 U/L / AST: 131 U/L / GGT: x           PT/INR - ( 15 May 2022 07:41 )   PT: 16.9 sec;   INR: 1.45 ratio         PTT - ( 15 May 2022 07:41 )  PTT:35.7 sec  CAPILLARY BLOOD GLUCOSE              Assessment/Plan: 86 yo male with a pmh/o Anemia, HLD, Afib, R Renal lesion, diverticulitis, 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 Admitted due to:    #Metabolic encephalopathy-resolving  right should pain due to septic arthritis with staph aureus bacteremia.  - Continue IV ceftriaxone and vanco per ID  - Bcx: Staph Aureus, repeat blood cultures. Will need long term IV antibiotics  - s/p joint aspiration x 2, most recent 1-2cc removed by IR on 5/13  - joint fluid: Staph Aureus   - ortho following, s/p I+D on 5/15  - Echocardiogram pending   - Pt declines further imaging of shoulder  - Pain control with tylenol and lidoderm patch  - Continue PT   - Seroquel oral PRN agitation  - monitor transaminitis, could be due to infection    #Chronic anemia: STABLE    #Protein calorie malnutrition:  - nutrition consult     #HLD:  - c/w statin    #Paroxysmal Afib:  - xarelto  - c/w tikosyn    #chronic lower extremity Venous stasis dermatitis:  - no active extremity infection  - Wound care   - elevate extremities    DVT ppx:  - xarelto    Assessment and Plan:   Nutritional Assessment:  · Nutritional Assessment	This patient has been assessed with a concern for Malnutrition and has been determined to have a diagnosis/diagnoses of Severe protein-calorie malnutrition.    This patient is being managed with:   Diet NPO after Midnight-     NPO Start Date: 12-May-2022   NPO Start Time: 23:59  Except Medications  Entered: May 12 2022  8:07PM

## 2022-05-16 NOTE — PROGRESS NOTE ADULT - SUBJECTIVE AND OBJECTIVE BOX
Date of service: 05-16-22 @ 12:28    Lying in bed in NAD  s/p right shoulder surgery  Has local pain  Denies fever  Reported with new bacteremia    ROS: no fever or chills; denies dizziness, no HA, no SOB or cough, no abdominal pain, no diarrhea or constipation; no dysuria, no legs pain    MEDICATIONS  (STANDING):  apixaban 5 milliGRAM(s) Oral two times a day  atorvastatin 10 milliGRAM(s) Oral at bedtime  ceFAZolin   IVPB 2000 milliGRAM(s) IV Intermittent every 8 hours  dofetilide 250 MICROGram(s) Oral two times a day  lactated ringers. 1000 milliLiter(s) (75 mL/Hr) IV Continuous <Continuous>  lidocaine   4% Patch 1 Patch Transdermal every 24 hours  timolol 0.25% Solution 1 Drop(s) Both EYES daily    Vital Signs Last 24 Hrs  T(C): 36.6 (16 May 2022 07:19), Max: 37.1 (16 May 2022 01:00)  T(F): 97.8 (16 May 2022 07:19), Max: 98.7 (16 May 2022 01:00)  HR: 74 (16 May 2022 07:19) (74 - 90)  BP: 123/74 (16 May 2022 07:19) (115/75 - 123/74)  BP(mean): --  RR: 18 (16 May 2022 07:19) (17 - 18)  SpO2: 98% (16 May 2022 07:19) (96% - 98%)     Physical exam:    Constitutional:  No acute distress  HEENT: NC/AT, EOMI, PERRLA, conjunctivae clear; ears and nose atraumatic; pharynx benign  Neck: supple; thyroid not palpable  Back: no tenderness  Respiratory: respiratory effort normal; clear to auscultation  Cardiovascular: S1S2 regular, no murmurs  Abdomen: soft, not tender, not distended, positive BS; no liver or spleen organomegaly  Genitourinary: no suprapubic tenderness  Lymphatic: no LN palpable  Musculoskeletal: no muscle tenderness, no joint swelling or tenderness  Skin intact with significant swelling and erythema overlying AC joint, diffuse mild swelling about the shoulder. TTP over AC joint. Cannot ROM shoulder due to pain  Extremities: 1+ pedal edema  o open wounds. Bilateral lower extremities with dry scaling skin and Hemosiderin staining  Neurological/ Psychiatric: AxOx3, judgement and insight normal; moving all extremities  Skin: no rashes; no palpable lesions    Labs: reviewed                        10.9   13.06 )-----------( 450      ( 16 May 2022 08:41 )             34.8     05-16    137  |  103  |  18  ----------------------------<  133<H>  5.0   |  28  |  0.62    Ca    8.5      16 May 2022 08:41    TPro  6.7  /  Alb  1.9<L>  /  TBili  0.4  /  DBili  x   /  AST  131<H>  /  ALT  85<H>  /  AlkPhos  192<H>  05-16      Vancomycin Level, Trough: 10.1 ug/mL (05-14 @ 22:00)    C-Reactive Protein, Serum: 247 mg/L (05-12-22 @ 16:28)                        11.4   11.97 )-----------( 278      ( 13 May 2022 07:57 )             37.3     05-13    138  |  103  |  24<H>  ----------------------------<  109<H>  4.3   |  26  |  0.83    Ca    8.6      13 May 2022 07:57    TPro  6.9  /  Alb  2.2<L>  /  TBili  0.8  /  DBili  x   /  AST  74<H>  /  ALT  47  /  AlkPhos  163<H>  05-13     LIVER FUNCTIONS - ( 13 May 2022 07:57 )  Alb: 2.2 g/dL / Pro: 6.9 gm/dL / ALK PHOS: 163 U/L / ALT: 47 U/L / AST: 74 U/L / GGT: x           COVID-19 PCR: NotDetec (05-12-22 @ 15:15)      Culture - Joint Fluid (collected 15 May 2022 09:45)  Source: Joint Fl ACROMIOCLAVICULAR JOINT  Gram Stain (15 May 2022 23:02):    No polymorphonuclear leukocytes seen    No organisms seen    Culture - Joint Fluid (collected 15 May 2022 09:45)  Source: Joint Fl SUBACROMIAL SPACE  Gram Stain (15 May 2022 22:57):    Few polymorphonuclear leukocytes per low power field    No organisms seen    Culture - Joint Fluid (collected 15 May 2022 09:45)  Source: Joint Fl None  Gram Stain (15 May 2022 22:59):    No polymorphonuclear leukocytes seen    No organisms seen    Culture - Aspirate with Gram Stain (collected 13 May 2022 14:30)  Source: .Aspirate RIGHT SHOULDER ASPIRATION  Preliminary Report (14 May 2022 20:12):    Few Staphylococcus aureus  Organism: Staphylococcus aureus (15 May 2022 17:42)  Organism: Staphylococcus aureus (15 May 2022 17:42)      -  Ampicillin/Sulbactam: S <=8/4      -  Cefazolin: S <=4      -  Clindamycin: S <=0.25      -  Erythromycin: S <=0.25      -  Gentamicin: S <=1 Should not be used as monotherapy      -  Oxacillin: S <=0.25      -  Penicillin: R >8      -  Rifampin: S <=1 Should not be used as monotherapy      -  Tetra/Doxy: S <=1      -  Trimethoprim/Sulfamethoxazole: S <=0.5/9.5      -  Vancomycin: S 1      Method Type: ДМИТРИЙ    Culture - Blood (collected 12 May 2022 22:10)  Source: .Blood None  Gram Stain (14 May 2022 02:05):    Growth in anaerobic bottle: Gram positive cocci in pairs  Final Report (15 May 2022 14:45):    Growth in anaerobic bottle: Staphylococcus aureus  Organism: Blood Culture PCR  Staphylococcus aureus (15 May 2022 14:45)  Organism: Staphylococcus aureus (15 May 2022 14:45)      -  Ampicillin/Sulbactam: S <=8/4      -  Cefazolin: S <=4      -  Clindamycin: S <=0.25      -  Erythromycin: S <=0.25      -  Gentamicin: S <=1 Should not be used as monotherapy      -  Oxacillin: S <=0.25      -  Penicillin: R 2      -  Rifampin: S <=1 Should not be used as monotherapy      -  Tetra/Doxy: S <=1      -  Trimethoprim/Sulfamethoxazole: S <=0.5/9.5      -  Vancomycin: S 1      Method Type: ДМИТРИЙ  Organism: Blood Culture PCR (15 May 2022 14:45)      -  Staphylococcus aureus: Detec Any isolate of Staphylococcus aureus from a blood culture is NOT considered a contaminant.      Method Type: PCR    Culture - Blood (collected 12 May 2022 22:10)  Source: .Blood None  Gram Stain (14 May 2022 17:07):    Growth in aerobic bottle: Gram Positive Cocci in Clusters    Growth in anaerobic bottle: Gram Positive Cocci in Clusters  Final Report (15 May 2022 16:52):    Growth in aerobic and anaerobic bottles: Staphylococcus aureus    See previous culture [id number]    Culture - Aspirate with Gram Stain (collected 12 May 2022 20:00)  Source: Joint Fl Joint Fluid  Preliminary Report (14 May 2022 12:39):    Few Staphylococcus aureus  Organism: Staphylococcus aureus (15 May 2022 09:20)  Organism: Staphylococcus aureus (15 May 2022 09:20)      -  Ampicillin/Sulbactam: S <=8/4      -  Cefazolin: S <=4      -  Clindamycin: S <=0.25      -  Erythromycin: S 0.5      -  Gentamicin: S <=1 Should not be used as monotherapy      -  Oxacillin: S 0.5      -  Penicillin: R 8      -  Rifampin: S <=1 Should not be used as monotherapy      -  Tetra/Doxy: S <=1      -  Trimethoprim/Sulfamethoxazole: S <=0.5/9.5      -  Vancomycin: S 2      Method Type: ДМИТРИЙ    Radiology: all available radiological tests reviewed    < from: Xray Chest 1 View- PORTABLE-Urgent (Xray Chest 1 View- PORTABLE-Urgent .) (05.12.22 @ 16:16) >  IMPRESSION: Right shoulder degeneration.  Persistent linear density at the right base medially.  < end of copied text >    Advanced directives addressed: full resuscitation

## 2022-05-17 LAB
ALBUMIN SERPL ELPH-MCNC: 2.1 G/DL — LOW (ref 3.3–5)
ALP SERPL-CCNC: 190 U/L — HIGH (ref 40–120)
ALT FLD-CCNC: 107 U/L — HIGH (ref 12–78)
ANION GAP SERPL CALC-SCNC: 7 MMOL/L — SIGNIFICANT CHANGE UP (ref 5–17)
AST SERPL-CCNC: 178 U/L — HIGH (ref 15–37)
BILIRUB SERPL-MCNC: 0.4 MG/DL — SIGNIFICANT CHANGE UP (ref 0.2–1.2)
BUN SERPL-MCNC: 18 MG/DL — SIGNIFICANT CHANGE UP (ref 7–23)
CALCIUM SERPL-MCNC: 8.4 MG/DL — LOW (ref 8.5–10.1)
CHLORIDE SERPL-SCNC: 102 MMOL/L — SIGNIFICANT CHANGE UP (ref 96–108)
CO2 SERPL-SCNC: 30 MMOL/L — SIGNIFICANT CHANGE UP (ref 22–31)
CREAT SERPL-MCNC: 0.58 MG/DL — SIGNIFICANT CHANGE UP (ref 0.5–1.3)
EGFR: 94 ML/MIN/1.73M2 — SIGNIFICANT CHANGE UP
GLUCOSE SERPL-MCNC: 112 MG/DL — HIGH (ref 70–99)
HCT VFR BLD CALC: 34.4 % — LOW (ref 39–50)
HGB BLD-MCNC: 10.7 G/DL — LOW (ref 13–17)
MCHC RBC-ENTMCNC: 28.5 PG — SIGNIFICANT CHANGE UP (ref 27–34)
MCHC RBC-ENTMCNC: 31.1 GM/DL — LOW (ref 32–36)
MCV RBC AUTO: 91.7 FL — SIGNIFICANT CHANGE UP (ref 80–100)
PLATELET # BLD AUTO: 516 K/UL — HIGH (ref 150–400)
POTASSIUM SERPL-MCNC: 3.9 MMOL/L — SIGNIFICANT CHANGE UP (ref 3.5–5.3)
POTASSIUM SERPL-SCNC: 3.9 MMOL/L — SIGNIFICANT CHANGE UP (ref 3.5–5.3)
PROT SERPL-MCNC: 6.5 GM/DL — SIGNIFICANT CHANGE UP (ref 6–8.3)
RBC # BLD: 3.75 M/UL — LOW (ref 4.2–5.8)
RBC # FLD: 15 % — HIGH (ref 10.3–14.5)
SODIUM SERPL-SCNC: 139 MMOL/L — SIGNIFICANT CHANGE UP (ref 135–145)
WBC # BLD: 11.13 K/UL — HIGH (ref 3.8–10.5)
WBC # FLD AUTO: 11.13 K/UL — HIGH (ref 3.8–10.5)

## 2022-05-17 PROCEDURE — 99232 SBSQ HOSP IP/OBS MODERATE 35: CPT

## 2022-05-17 RX ADMIN — DOFETILIDE 250 MICROGRAM(S): 0.25 CAPSULE ORAL at 10:01

## 2022-05-17 RX ADMIN — LIDOCAINE 1 PATCH: 4 CREAM TOPICAL at 23:53

## 2022-05-17 RX ADMIN — Medication 650 MILLIGRAM(S): at 22:35

## 2022-05-17 RX ADMIN — LIDOCAINE 1 PATCH: 4 CREAM TOPICAL at 10:01

## 2022-05-17 RX ADMIN — Medication 100 MILLIGRAM(S): at 05:16

## 2022-05-17 RX ADMIN — DOFETILIDE 250 MICROGRAM(S): 0.25 CAPSULE ORAL at 21:28

## 2022-05-17 RX ADMIN — Medication 100 MILLIGRAM(S): at 21:28

## 2022-05-17 RX ADMIN — QUETIAPINE FUMARATE 25 MILLIGRAM(S): 200 TABLET, FILM COATED ORAL at 21:28

## 2022-05-17 RX ADMIN — Medication 100 MILLIGRAM(S): at 13:36

## 2022-05-17 RX ADMIN — Medication 650 MILLIGRAM(S): at 21:36

## 2022-05-17 RX ADMIN — APIXABAN 5 MILLIGRAM(S): 2.5 TABLET, FILM COATED ORAL at 10:01

## 2022-05-17 RX ADMIN — APIXABAN 5 MILLIGRAM(S): 2.5 TABLET, FILM COATED ORAL at 21:28

## 2022-05-17 RX ADMIN — Medication 1 DROP(S): at 10:01

## 2022-05-17 NOTE — PROGRESS NOTE ADULT - SUBJECTIVE AND OBJECTIVE BOX
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   Pt endorsing weakness, chills, fatigue, non radiating, severe, throbbing, constant, not quantifiable on pain scale R shoulder pain which is exacerbated by pressure or movement and alleviated by ice/rest.   Denies sob, cough, chest pain, palpitations, nausea, vomiting, leg swelling, orthopnea, headache, abd pain, diarrhea, constipation, rash, paresthesias, changes in vision/hearing/speech/gait.   Of note, pt receives home care, home PT, and wound care for chronic venous stasis dermatitis which was recently treated for cellulitis. Pt arrived to ED in bilateral ACE wraps which were removed for evaluation.       5/13: Lying in bed, alert, mildly forgetful but comfortable, right shoulder tenderness.  Denies fever, chills, N, V, abd pain, CP, SOB.  5/14:  Confused, agitated, states he wants to go home.  Attempted to calm him down.  Spoke to daughter and updated her on current situation and plan of care for his shoulder infection.  5/15: Confused at times, alert otherwise, comfortable.  Found to have staph bacteremia, family aware.  05/16/22: Patient seen and examined. Denies any new complaints. Discussed with patient regarding management and d/c plan. Discussed with Dr Dowd.   05/17/22: Sitting in chair. No new issues.     REVIEW OF SYSTEMS: All other review of systems is negative unless indicated above.     Vital Signs Last 24 Hrs  T(C): 36.6 (17 May 2022 14:57), Max: 36.8 (16 May 2022 22:03)  T(F): 97.8 (17 May 2022 14:57), Max: 98.3 (17 May 2022 08:12)  HR: 73 (17 May 2022 14:57) (73 - 77)  BP: 138/43 (17 May 2022 14:57) (118/89 - 139/63)  BP(mean): 83 (16 May 2022 17:32) (83 - 83)  RR: 18 (17 May 2022 14:57) (16 - 18)  SpO2: 99% (17 May 2022 14:57) (97% - 100%)      PHYSICAL EXAM:    Constitutional: NAD, awake and alert, confused  HEENT: PERR, EOMI, Normal Hearing, MMM  Neck: Soft and supple  Respiratory: Breath sounds are clear bilaterally, No wheezing, rales or rhonchi  Cardiovascular: S1 and S2, regular rate and rhythm, no Murmurs, gallops or rubs  Gastrointestinal: Bowel Sounds present, soft, nontender, nondistended, no guarding, no rebound  Extremities: No peripheral edema, right shoulder warm, erythematous, tender, and edematous   Neurological: A/O x 2, no focal deficits in my limited exam    MEDICATIONS  (STANDING):  atorvastatin 10 milliGRAM(s) Oral at bedtime  cefTRIAXone   IVPB 2000 milliGRAM(s) IV Intermittent every 24 hours  dofetilide 250 MICROGram(s) Oral two times a day  lactated ringers. 1000 milliLiter(s) (75 mL/Hr) IV Continuous <Continuous>  lidocaine   4% Patch 1 Patch Transdermal every 24 hours  timolol 0.25% Solution 1 Drop(s) Both EYES daily  vancomycin  IVPB 750 milliGRAM(s) IV Intermittent every 12 hours    MEDICATIONS  (PRN):  acetaminophen     Tablet .. 650 milliGRAM(s) Oral every 6 hours PRN Temp greater or equal to 38C (100.4F), Mild Pain (1 - 3)  ALBUTerol    90 MICROgram(s) HFA Inhaler 1 Puff(s) Inhalation every 6 hours PRN Shortness of Breath and/or Wheezing  oxyCODONE    IR 2.5 milliGRAM(s) Oral every 4 hours PRN Moderate Pain (4 - 6)  oxyCODONE    IR 5 milliGRAM(s) Oral every 4 hours PRN Severe Pain (7 - 10)  QUEtiapine 25 milliGRAM(s) Oral two times a day PRN agitation                                     10.7   11.13 )-----------( 516      ( 17 May 2022 09:18 )             34.4     17 May 2022 09:18    139    |  102    |  18     ----------------------------<  112    3.9     |  30     |  0.58     Ca    8.4        17 May 2022 09:18    TPro  6.5    /  Alb  2.1    /  TBili  0.4    /  DBili  x      /  AST  178    /  ALT  107    /  AlkPhos  190    17 May 2022 09:18    LIVER FUNCTIONS - ( 17 May 2022 09:18 )  Alb: 2.1 g/dL / Pro: 6.5 gm/dL / ALK PHOS: 190 U/L / ALT: 107 U/L / AST: 178 U/L / GGT: x                 Assessment/Plan: 88 yo male with a pmh/o Anemia, HLD, Afib, R Renal lesion, diverticulitis, 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 Admitted due to:    #Metabolic encephalopathy-resolved  right should pain due to septic arthritis with staph aureus bacteremia.  - Continue IV ceftriaxone and vanco per ID  - Bcx: Staph Aureus, follow repeat blood cultures. Will need long term IV antibiotics  - s/p joint aspiration x 2, most recent 1-2cc removed by IR on 5/13  - joint fluid: Staph Aureus   - ortho following, s/p I+D on 5/15  - Echocardiogram pending   - Pt declines further imaging of shoulder  - Pain control with tylenol and lidoderm patch  - Continue PT   - Seroquel oral PRN agitation  - monitor transaminitis, could be due to infection. Hold lipitor    #Chronic anemia: STABLE    #Protein calorie malnutrition:  - nutrition consult     #HLD:  - c/w statin    #Paroxysmal Afib:  - xarelto  - c/w tikosyn    #chronic lower extremity Venous stasis dermatitis:  - no active extremity infection  - Wound care   - elevate extremities    DVT ppx:  - xarelto    Assessment and Plan:   Nutritional Assessment:  · Nutritional Assessment	This patient has been assessed with a concern for Malnutrition and has been determined to have a diagnosis/diagnoses of Severe protein-calorie malnutrition.    This patient is being managed with:   Diet NPO after Midnight-     NPO Start Date: 12-May-2022   NPO Start Time: 23:59  Except Medications  Entered: May 12 2022  8:07PM

## 2022-05-17 NOTE — PROGRESS NOTE ADULT - SUBJECTIVE AND OBJECTIVE BOX
Patient seen and examined at bedside. Patient reports continued right shoulder pain. No other acute complaints at this time. OR Cultures growing staph aureus. Pain well controlled. Denies chest pain, shortness of breath, nausea or vomiting.       VITAL SIGNS:  T(C): 36.8 (05-16-22 @ 22:03), Max: 36.8 (05-16-22 @ 22:03)  HR: 77 (05-16-22 @ 22:03) (74 - 77)  BP: 138/65 (05-16-22 @ 22:03) (118/89 - 138/65)  RR: 18 (05-16-22 @ 22:03) (18 - 18)  SpO2: 100% (05-16-22 @ 22:03) (98% - 100%)      LABS:                        10.9   13.06 )-----------( 450      ( 16 May 2022 08:41 )             34.8     05-16    137  |  103  |  18  ----------------------------<  133<H>  5.0   |  28  |  0.62    Ca    8.5      16 May 2022 08:41    TPro  6.7  /  Alb  1.9<L>  /  TBili  0.4  /  DBili  x   /  AST  131<H>  /  ALT  85<H>  /  AlkPhos  192<H>  05-16    PT/INR - ( 15 May 2022 07:41 )   PT: 16.9 sec;   INR: 1.45 ratio         PTT - ( 15 May 2022 07:41 )  PTT:35.7 sec              PE:    General: NAD, resting comfortably in bed  RUE:   Dressing C/D/I   Drain intact  Compartments soft and compressible  No calf tenderness bilaterally  +Axillary/Musculocutaneous/Radial/Median/AIN/PIN intact  SILT C5-T1  2+ radial pulses      A/P:  87y m s/p R GH and AC Joint I&D POD 2  -PT/OT -WBAT RUE  -Pain Control  -DVT ppx per medical team  -Antibiotics per ID  -FU AM Labs  -Rest, ice, compress and elevate the extremity as we needed  -Incentive Spirometry  -Medical management appreciated  -OR Cultures growing staph aureus, follow for sensitivities   -Aspiration and blood cultures growing staph MSSA

## 2022-05-17 NOTE — PROGRESS NOTE ADULT - SUBJECTIVE AND OBJECTIVE BOX
Date of service: 05-17-22 @ 09:44    Has right shoulder discomfort  No fever  Denies chills    ROS: no fever or chills; denies dizziness, no HA, no SOB or cough, no abdominal pain, no diarrhea or constipation; no dysuria, no legs pain    MEDICATIONS  (STANDING):  apixaban 5 milliGRAM(s) Oral two times a day  atorvastatin 10 milliGRAM(s) Oral at bedtime  ceFAZolin   IVPB 2000 milliGRAM(s) IV Intermittent every 8 hours  dofetilide 250 MICROGram(s) Oral two times a day  lactated ringers. 1000 milliLiter(s) (75 mL/Hr) IV Continuous <Continuous>  lidocaine   4% Patch 1 Patch Transdermal every 24 hours  timolol 0.25% Solution 1 Drop(s) Both EYES daily    Vital Signs Last 24 Hrs  T(C): 36.8 (17 May 2022 08:12), Max: 36.8 (16 May 2022 22:03)  T(F): 98.3 (17 May 2022 08:12), Max: 98.3 (17 May 2022 08:12)  HR: 75 (17 May 2022 08:12) (75 - 77)  BP: 139/63 (17 May 2022 08:12) (118/89 - 139/63)  BP(mean): 83 (16 May 2022 17:32) (83 - 83)  RR: 16 (17 May 2022 08:12) (16 - 18)  SpO2: 97% (17 May 2022 08:12) (97% - 100%)     Physical exam:    Constitutional:  No acute distress  HEENT: NC/AT, EOMI, PERRLA, conjunctivae clear; ears and nose atraumatic; pharynx benign  Neck: supple; thyroid not palpable  Back: no tenderness  Respiratory: respiratory effort normal; clear to auscultation  Cardiovascular: S1S2 regular, no murmurs  Abdomen: soft, not tender, not distended, positive BS; no liver or spleen organomegaly  Genitourinary: no suprapubic tenderness  Lymphatic: no LN palpable  Musculoskeletal: no muscle tenderness, no joint swelling or tenderness  Skin intact with significant swelling and erythema overlying AC joint, diffuse mild swelling about the shoulder. TTP over AC joint. Cannot ROM shoulder due to pain  Extremities: 1+ pedal edema  o open wounds. Bilateral lower extremities with dry scaling skin and Hemosiderin staining  Neurological/ Psychiatric: AxOx3, judgement and insight normal; moving all extremities  Skin: no rashes; no palpable lesions    Labs: reviewed                        10.7   11.13 )-----------( 516      ( 17 May 2022 09:18 )             34.4     05-16    137  |  103  |  18  ----------------------------<  133<H>  5.0   |  28  |  0.62    Ca    8.5      16 May 2022 08:41    TPro  6.7  /  Alb  1.9<L>  /  TBili  0.4  /  DBili  x   /  AST  131<H>  /  ALT  85<H>  /  AlkPhos  192<H>  05-16    C-Reactive Protein, Serum: 129 mg/L (05-16-22 @ 08:41)  C-Reactive Protein, Serum: 247 mg/L (05-12-22 @ 16:28)                        10.9   13.06 )-----------( 450      ( 16 May 2022 08:41 )             34.8     05-16    137  |  103  |  18  ----------------------------<  133<H>  5.0   |  28  |  0.62    Ca    8.5      16 May 2022 08:41    TPro  6.7  /  Alb  1.9<L>  /  TBili  0.4  /  DBili  x   /  AST  131<H>  /  ALT  85<H>  /  AlkPhos  192<H>  05-16      Vancomycin Level, Trough: 10.1 ug/mL (05-14 @ 22:00)    C-Reactive Protein, Serum: 247 mg/L (05-12-22 @ 16:28)                        11.4   11.97 )-----------( 278      ( 13 May 2022 07:57 )             37.3     05-13    138  |  103  |  24<H>  ----------------------------<  109<H>  4.3   |  26  |  0.83    Ca    8.6      13 May 2022 07:57    TPro  6.9  /  Alb  2.2<L>  /  TBili  0.8  /  DBili  x   /  AST  74<H>  /  ALT  47  /  AlkPhos  163<H>  05-13     LIVER FUNCTIONS - ( 13 May 2022 07:57 )  Alb: 2.2 g/dL / Pro: 6.9 gm/dL / ALK PHOS: 163 U/L / ALT: 47 U/L / AST: 74 U/L / GGT: x           COVID-19 PCR: NotDetec (05-12-22 @ 15:15)      Culture - Joint Fluid (collected 15 May 2022 09:45)  Source: Joint Fl ACROMIOCLAVICULAR JOINT  Gram Stain (15 May 2022 23:02):    No polymorphonuclear leukocytes seen    No organisms seen    Culture - Joint Fluid (collected 15 May 2022 09:45)  Source: Joint Fl SUBACROMIAL SPACE  Gram Stain (15 May 2022 22:57):    Few polymorphonuclear leukocytes per low power field    No organisms seen    Culture - Joint Fluid (collected 15 May 2022 09:45)  Source: Joint Fl None  Gram Stain (15 May 2022 22:59):    No polymorphonuclear leukocytes seen    No organisms seen    Culture - Aspirate with Gram Stain (collected 13 May 2022 14:30)  Source: .Aspirate RIGHT SHOULDER ASPIRATION  Preliminary Report (14 May 2022 20:12):    Few Staphylococcus aureus  Organism: Staphylococcus aureus (15 May 2022 17:42)  Organism: Staphylococcus aureus (15 May 2022 17:42)      -  Ampicillin/Sulbactam: S <=8/4      -  Cefazolin: S <=4      -  Clindamycin: S <=0.25      -  Erythromycin: S <=0.25      -  Gentamicin: S <=1 Should not be used as monotherapy      -  Oxacillin: S <=0.25      -  Penicillin: R >8      -  Rifampin: S <=1 Should not be used as monotherapy      -  Tetra/Doxy: S <=1      -  Trimethoprim/Sulfamethoxazole: S <=0.5/9.5      -  Vancomycin: S 1      Method Type: ДМИТРИЙ    Culture - Blood (collected 12 May 2022 22:10)  Source: .Blood None  Gram Stain (14 May 2022 02:05):    Growth in anaerobic bottle: Gram positive cocci in pairs  Final Report (15 May 2022 14:45):    Growth in anaerobic bottle: Staphylococcus aureus  Organism: Blood Culture PCR  Staphylococcus aureus (15 May 2022 14:45)  Organism: Staphylococcus aureus (15 May 2022 14:45)      -  Ampicillin/Sulbactam: S <=8/4      -  Cefazolin: S <=4      -  Clindamycin: S <=0.25      -  Erythromycin: S <=0.25      -  Gentamicin: S <=1 Should not be used as monotherapy      -  Oxacillin: S <=0.25      -  Penicillin: R 2      -  Rifampin: S <=1 Should not be used as monotherapy      -  Tetra/Doxy: S <=1      -  Trimethoprim/Sulfamethoxazole: S <=0.5/9.5      -  Vancomycin: S 1      Method Type: ДМИТРИЙ  Organism: Blood Culture PCR (15 May 2022 14:45)      -  Staphylococcus aureus: Detec Any isolate of Staphylococcus aureus from a blood culture is NOT considered a contaminant.      Method Type: PCR    Culture - Blood (collected 12 May 2022 22:10)  Source: .Blood None  Gram Stain (14 May 2022 17:07):    Growth in aerobic bottle: Gram Positive Cocci in Clusters    Growth in anaerobic bottle: Gram Positive Cocci in Clusters  Final Report (15 May 2022 16:52):    Growth in aerobic and anaerobic bottles: Staphylococcus aureus    See previous culture [id number]    Culture - Aspirate with Gram Stain (collected 12 May 2022 20:00)  Source: Joint Fl Joint Fluid  Preliminary Report (14 May 2022 12:39):    Few Staphylococcus aureus  Organism: Staphylococcus aureus (15 May 2022 09:20)  Organism: Staphylococcus aureus (15 May 2022 09:20)      -  Ampicillin/Sulbactam: S <=8/4      -  Cefazolin: S <=4      -  Clindamycin: S <=0.25      -  Erythromycin: S 0.5      -  Gentamicin: S <=1 Should not be used as monotherapy      -  Oxacillin: S 0.5      -  Penicillin: R 8      -  Rifampin: S <=1 Should not be used as monotherapy      -  Tetra/Doxy: S <=1      -  Trimethoprim/Sulfamethoxazole: S <=0.5/9.5      -  Vancomycin: S 2      Method Type: ДМИТРИЙ    Radiology: all available radiological tests reviewed    < from: Xray Chest 1 View- PORTABLE-Urgent (Xray Chest 1 View- PORTABLE-Urgent .) (05.12.22 @ 16:16) >  IMPRESSION: Right shoulder degeneration.  Persistent linear density at the right base medially.  < end of copied text >    Advanced directives addressed: full resuscitation

## 2022-05-18 LAB
ALBUMIN SERPL ELPH-MCNC: 2.2 G/DL — LOW (ref 3.3–5)
ALP SERPL-CCNC: 240 U/L — HIGH (ref 40–120)
ALT FLD-CCNC: 91 U/L — HIGH (ref 12–78)
ANION GAP SERPL CALC-SCNC: 4 MMOL/L — LOW (ref 5–17)
AST SERPL-CCNC: 136 U/L — HIGH (ref 15–37)
BILIRUB SERPL-MCNC: 0.4 MG/DL — SIGNIFICANT CHANGE UP (ref 0.2–1.2)
BUN SERPL-MCNC: 15 MG/DL — SIGNIFICANT CHANGE UP (ref 7–23)
CALCIUM SERPL-MCNC: 8.7 MG/DL — SIGNIFICANT CHANGE UP (ref 8.5–10.1)
CHLORIDE SERPL-SCNC: 101 MMOL/L — SIGNIFICANT CHANGE UP (ref 96–108)
CO2 SERPL-SCNC: 32 MMOL/L — HIGH (ref 22–31)
CREAT SERPL-MCNC: 0.6 MG/DL — SIGNIFICANT CHANGE UP (ref 0.5–1.3)
EGFR: 93 ML/MIN/1.73M2 — SIGNIFICANT CHANGE UP
GLUCOSE SERPL-MCNC: 94 MG/DL — SIGNIFICANT CHANGE UP (ref 70–99)
HCT VFR BLD CALC: 35.3 % — LOW (ref 39–50)
HGB BLD-MCNC: 11.2 G/DL — LOW (ref 13–17)
MCHC RBC-ENTMCNC: 28.6 PG — SIGNIFICANT CHANGE UP (ref 27–34)
MCHC RBC-ENTMCNC: 31.7 GM/DL — LOW (ref 32–36)
MCV RBC AUTO: 90.1 FL — SIGNIFICANT CHANGE UP (ref 80–100)
PLATELET # BLD AUTO: 534 K/UL — HIGH (ref 150–400)
POTASSIUM SERPL-MCNC: 3.9 MMOL/L — SIGNIFICANT CHANGE UP (ref 3.5–5.3)
POTASSIUM SERPL-SCNC: 3.9 MMOL/L — SIGNIFICANT CHANGE UP (ref 3.5–5.3)
PROT SERPL-MCNC: 6.8 GM/DL — SIGNIFICANT CHANGE UP (ref 6–8.3)
RBC # BLD: 3.92 M/UL — LOW (ref 4.2–5.8)
RBC # FLD: 15 % — HIGH (ref 10.3–14.5)
SODIUM SERPL-SCNC: 137 MMOL/L — SIGNIFICANT CHANGE UP (ref 135–145)
WBC # BLD: 10.1 K/UL — SIGNIFICANT CHANGE UP (ref 3.8–10.5)
WBC # FLD AUTO: 10.1 K/UL — SIGNIFICANT CHANGE UP (ref 3.8–10.5)

## 2022-05-18 PROCEDURE — 99232 SBSQ HOSP IP/OBS MODERATE 35: CPT

## 2022-05-18 RX ADMIN — DOFETILIDE 250 MICROGRAM(S): 0.25 CAPSULE ORAL at 09:33

## 2022-05-18 RX ADMIN — APIXABAN 5 MILLIGRAM(S): 2.5 TABLET, FILM COATED ORAL at 21:25

## 2022-05-18 RX ADMIN — Medication 650 MILLIGRAM(S): at 14:01

## 2022-05-18 RX ADMIN — Medication 100 MILLIGRAM(S): at 21:25

## 2022-05-18 RX ADMIN — Medication 1 DROP(S): at 09:37

## 2022-05-18 RX ADMIN — OXYCODONE HYDROCHLORIDE 2.5 MILLIGRAM(S): 5 TABLET ORAL at 12:07

## 2022-05-18 RX ADMIN — DOFETILIDE 250 MICROGRAM(S): 0.25 CAPSULE ORAL at 21:26

## 2022-05-18 RX ADMIN — Medication 100 MILLIGRAM(S): at 14:01

## 2022-05-18 RX ADMIN — LIDOCAINE 1 PATCH: 4 CREAM TOPICAL at 09:34

## 2022-05-18 RX ADMIN — Medication 100 MILLIGRAM(S): at 05:26

## 2022-05-18 RX ADMIN — APIXABAN 5 MILLIGRAM(S): 2.5 TABLET, FILM COATED ORAL at 13:50

## 2022-05-18 NOTE — PROGRESS NOTE ADULT - SUBJECTIVE AND OBJECTIVE BOX
Patient seen and examined at bedside. Patient reports continued right shoulder pain. No other acute complaints at this time. OR Cultures growing staph aureus. Pain well controlled. Denies chest pain, shortness of breath, nausea or vomiting.           VITAL SIGNS:  T(C): 36.6 (05-17-22 @ 14:57), Max: 36.8 (05-17-22 @ 08:12)  HR: 78 (05-17-22 @ 21:17) (73 - 78)  BP: 166/68 (05-17-22 @ 21:17) (138/43 - 166/68)  RR: 18 (05-17-22 @ 21:17) (16 - 18)  SpO2: 93% (05-17-22 @ 21:17) (93% - 99%)      LABS:                        10.7   11.13 )-----------( 516      ( 17 May 2022 09:18 )             34.4     05-17    139  |  102  |  18  ----------------------------<  112<H>  3.9   |  30  |  0.58    Ca    8.4<L>      17 May 2022 09:18    TPro  6.5  /  Alb  2.1<L>  /  TBili  0.4  /  DBili  x   /  AST  178<H>  /  ALT  107<H>  /  AlkPhos  190<H>  05-17                  PE:    General: NAD, resting comfortably in bed  RUE:   Dressing C/D/I   Drain intact  continued area of swelling and erythema over AC joint  Active/passive shoulder ROM limited 2/2 pain  Compartments soft and compressible  No calf tenderness bilaterally  +Axillary/Musculocutaneous/Radial/Median/AIN/PIN intact  SILT C5-T1  2+ radial pulses      A/P:  87y m s/p R GH and AC Joint I&D POD 3  -PT/OT -WBAT RUE  -Pain Control  -DVT ppx per medical team  -Antibiotics per ID  -FU AM Labs  -Rest, ice, compress and elevate the extremity as we needed  -Incentive Spirometry  -Medical management appreciated  -OR Cultures growing staph aureus, follow for sensitivities   -Aspiration and blood cultures growing staph MSSA

## 2022-05-18 NOTE — PROGRESS NOTE ADULT - SUBJECTIVE AND OBJECTIVE BOX
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   Pt endorsing weakness, chills, fatigue, non radiating, severe, throbbing, constant, not quantifiable on pain scale R shoulder pain which is exacerbated by pressure or movement and alleviated by ice/rest.   Denies sob, cough, chest pain, palpitations, nausea, vomiting, leg swelling, orthopnea, headache, abd pain, diarrhea, constipation, rash, paresthesias, changes in vision/hearing/speech/gait.   Of note, pt receives home care, home PT, and wound care for chronic venous stasis dermatitis which was recently treated for cellulitis. Pt arrived to ED in bilateral ACE wraps which were removed for evaluation.       5/13: Lying in bed, alert, mildly forgetful but comfortable, right shoulder tenderness.  Denies fever, chills, N, V, abd pain, CP, SOB.  5/14:  Confused, agitated, states he wants to go home.  Attempted to calm him down.  Spoke to daughter and updated her on current situation and plan of care for his shoulder infection.  5/15: Confused at times, alert otherwise, comfortable.  Found to have staph bacteremia, family aware.  05/16/22: Patient seen and examined. Denies any new complaints. Discussed with patient regarding management and d/c plan. Discussed with Dr Dowd.   05/17/22: Sitting in chair. No new issues.   05/18/22: Complaining of right shoulder pain. Discussed with wife yesterday regarding management and d/c plan.     REVIEW OF SYSTEMS: All other review of systems is negative unless indicated above.     Vital Signs Last 24 Hrs  T(C): 36.8 (18 May 2022 14:58), Max: 36.9 (18 May 2022 07:32)  T(F): 98.2 (18 May 2022 14:58), Max: 98.4 (18 May 2022 07:32)  HR: 81 (18 May 2022 14:58) (74 - 81)  BP: 136/66 (18 May 2022 14:58) (136/66 - 166/68)  BP(mean): --  RR: 18 (18 May 2022 14:58) (18 - 18)  SpO2: 98% (18 May 2022 14:58) (93% - 98%)      PHYSICAL EXAM:    Constitutional: NAD, awake and alert, confused  HEENT: PERR, EOMI, Normal Hearing, MMM  Neck: Soft and supple  Respiratory: Breath sounds are clear bilaterally, No wheezing, rales or rhonchi  Cardiovascular: S1 and S2, regular rate and rhythm, no Murmurs, gallops or rubs  Gastrointestinal: Bowel Sounds present, soft, nontender, nondistended, no guarding, no rebound  Extremities: No peripheral edema, right shoulder warm, erythematous, tender, and edematous   Neurological: A/O x 2, no focal deficits in my limited exam    MEDICATIONS  (STANDING):  atorvastatin 10 milliGRAM(s) Oral at bedtime  cefTRIAXone   IVPB 2000 milliGRAM(s) IV Intermittent every 24 hours  dofetilide 250 MICROGram(s) Oral two times a day  lactated ringers. 1000 milliLiter(s) (75 mL/Hr) IV Continuous <Continuous>  lidocaine   4% Patch 1 Patch Transdermal every 24 hours  timolol 0.25% Solution 1 Drop(s) Both EYES daily  vancomycin  IVPB 750 milliGRAM(s) IV Intermittent every 12 hours    MEDICATIONS  (PRN):  acetaminophen     Tablet .. 650 milliGRAM(s) Oral every 6 hours PRN Temp greater or equal to 38C (100.4F), Mild Pain (1 - 3)  ALBUTerol    90 MICROgram(s) HFA Inhaler 1 Puff(s) Inhalation every 6 hours PRN Shortness of Breath and/or Wheezing  oxyCODONE    IR 2.5 milliGRAM(s) Oral every 4 hours PRN Moderate Pain (4 - 6)  oxyCODONE    IR 5 milliGRAM(s) Oral every 4 hours PRN Severe Pain (7 - 10)  QUEtiapine 25 milliGRAM(s) Oral two times a day PRN agitation                                                11.2   10.10 )-----------( 534      ( 18 May 2022 08:00 )             35.3     18 May 2022 08:00    137    |  101    |  15     ----------------------------<  94     3.9     |  32     |  0.60     Ca    8.7        18 May 2022 08:00    TPro  6.8    /  Alb  2.2    /  TBili  0.4    /  DBili  x      /  AST  136    /  ALT  91     /  AlkPhos  240    18 May 2022 08:00    LIVER FUNCTIONS - ( 18 May 2022 08:00 )  Alb: 2.2 g/dL / Pro: 6.8 gm/dL / ALK PHOS: 240 U/L / ALT: 91 U/L / AST: 136 U/L / GGT: x                           Assessment/Plan: 88 yo male with a pmh/o Anemia, HLD, Afib, R Renal lesion, diverticulitis, 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 Admitted due to:    #Metabolic encephalopathy-resolved  right should pain due to septic arthritis with staph aureus bacteremia.  - Continue IV ceftriaxone and vanco per ID  - Bcx: Staph Aureus, follow repeat blood cultures. Will need long term IV antibiotics  - s/p joint aspiration x 2, most recent 1-2cc removed by IR on 5/13  - joint fluid: Staph Aureus   - ortho following, s/p I+D on 5/15  - Echocardiogram pending   - Pt declines further imaging of shoulder  - Pain control with tylenol and lidoderm patch  - Continue PT   - Seroquel oral PRN agitation  - monitor transaminitis, could be due to infection. Hold lipitor    #Chronic anemia: STABLE    #Protein calorie malnutrition:  - nutrition consult     #HLD:  - c/w statin    #Paroxysmal Afib:  - xarelto  - c/w tikosyn    #chronic lower extremity Venous stasis dermatitis:  - no active extremity infection  - Wound care   - elevate extremities    DVT ppx:  - xarelto    Disposition: PICC tomorrow, possibly to rehab    Assessment and Plan:   Nutritional Assessment:  · Nutritional Assessment	This patient has been assessed with a concern for Malnutrition and has been determined to have a diagnosis/diagnoses of Severe protein-calorie malnutrition.    This patient is being managed with:   Diet NPO after Midnight-     NPO Start Date: 12-May-2022   NPO Start Time: 23:59  Except Medications  Entered: May 12 2022  8:07PM

## 2022-05-18 NOTE — PROVIDER CONTACT NOTE (OTHER) - SITUATION
Notify that upon placing pt back in bed, hemovac fell off pt's arm
Preliminary blood culture results positive. On call PA notified.

## 2022-05-18 NOTE — PROGRESS NOTE ADULT - SUBJECTIVE AND OBJECTIVE BOX
Date of service: 05-18-22 @ 11:04    Lying in bed in NAD  Has right soulder discomfort  No fever    ROS: no fever or chills; denies dizziness, no HA, no SOB or cough, no abdominal pain, no diarrhea or constipation; no dysuria, no legs pain, no rashes    MEDICATIONS  (STANDING):  apixaban 5 milliGRAM(s) Oral two times a day  ceFAZolin   IVPB 2000 milliGRAM(s) IV Intermittent every 8 hours  dofetilide 250 MICROGram(s) Oral two times a day  lactated ringers. 1000 milliLiter(s) (75 mL/Hr) IV Continuous <Continuous>  lidocaine   4% Patch 1 Patch Transdermal every 24 hours  timolol 0.25% Solution 1 Drop(s) Both EYES daily    Vital Signs Last 24 Hrs  T(C): 36.9 (18 May 2022 07:32), Max: 36.9 (18 May 2022 07:32)  T(F): 98.4 (18 May 2022 07:32), Max: 98.4 (18 May 2022 07:32)  HR: 74 (18 May 2022 07:32) (73 - 78)  BP: 150/62 (18 May 2022 07:32) (138/43 - 166/68)  BP(mean): --  RR: 18 (18 May 2022 07:32) (18 - 18)  SpO2: 97% (18 May 2022 07:32) (93% - 99%)     Physical exam:    Constitutional:  No acute distress  HEENT: NC/AT, EOMI, PERRLA, conjunctivae clear; ears and nose atraumatic  Neck: supple; thyroid not palpable  Back: no tenderness  Respiratory: respiratory effort normal; clear to auscultation  Cardiovascular: S1S2 regular, no murmurs  Abdomen: soft, not tender, not distended, positive BS  Genitourinary: no suprapubic tenderness  Lymphatic: no LN palpable  Musculoskeletal: no muscle tenderness, no joint swelling or tenderness  Skin intact with significant swelling and erythema overlying AC joint, diffuse mild swelling about the shoulder. TTP over AC joint.  Extremities: 1+ pedal edema  No open wounds. Bilateral lower extremities with dry scaling skin  Neurological/ Psychiatric: AxOx3, judgement and insight normal; moving all extremities  Skin: no rashes; no palpable lesions    Labs: reviewed                        10.7   11.13 )-----------( 516      ( 17 May 2022 09:18 )             34.4     05-16    137  |  103  |  18  ----------------------------<  133<H>  5.0   |  28  |  0.62    Ca    8.5      16 May 2022 08:41    TPro  6.7  /  Alb  1.9<L>  /  TBili  0.4  /  DBili  x   /  AST  131<H>  /  ALT  85<H>  /  AlkPhos  192<H>  05-16    C-Reactive Protein, Serum: 129 mg/L (05-16-22 @ 08:41)  C-Reactive Protein, Serum: 247 mg/L (05-12-22 @ 16:28)                        10.9   13.06 )-----------( 450      ( 16 May 2022 08:41 )             34.8     05-16    137  |  103  |  18  ----------------------------<  133<H>  5.0   |  28  |  0.62    Ca    8.5      16 May 2022 08:41    TPro  6.7  /  Alb  1.9<L>  /  TBili  0.4  /  DBili  x   /  AST  131<H>  /  ALT  85<H>  /  AlkPhos  192<H>  05-16      Vancomycin Level, Trough: 10.1 ug/mL (05-14 @ 22:00)    C-Reactive Protein, Serum: 247 mg/L (05-12-22 @ 16:28)                        11.4   11.97 )-----------( 278      ( 13 May 2022 07:57 )             37.3     05-13    138  |  103  |  24<H>  ----------------------------<  109<H>  4.3   |  26  |  0.83    Ca    8.6      13 May 2022 07:57    TPro  6.9  /  Alb  2.2<L>  /  TBili  0.8  /  DBili  x   /  AST  74<H>  /  ALT  47  /  AlkPhos  163<H>  05-13     LIVER FUNCTIONS - ( 13 May 2022 07:57 )  Alb: 2.2 g/dL / Pro: 6.9 gm/dL / ALK PHOS: 163 U/L / ALT: 47 U/L / AST: 74 U/L / GGT: x           COVID-19 PCR: NotDetec (05-12-22 @ 15:15)      Culture - Joint Fluid (collected 15 May 2022 09:45)  Source: Joint Fl ACROMIOCLAVICULAR JOINT  Gram Stain (15 May 2022 23:02):    No polymorphonuclear leukocytes seen    No organisms seen    Culture - Joint Fluid (collected 15 May 2022 09:45)  Source: Joint Fl SUBACROMIAL SPACE  Gram Stain (15 May 2022 22:57):    Few polymorphonuclear leukocytes per low power field    No organisms seen    Culture - Joint Fluid (collected 15 May 2022 09:45)  Source: Joint Fl None  Gram Stain (15 May 2022 22:59):    No polymorphonuclear leukocytes seen    No organisms seen    Culture - Aspirate with Gram Stain (collected 13 May 2022 14:30)  Source: .Aspirate RIGHT SHOULDER ASPIRATION  Preliminary Report (14 May 2022 20:12):    Few Staphylococcus aureus  Organism: Staphylococcus aureus (15 May 2022 17:42)  Organism: Staphylococcus aureus (15 May 2022 17:42)      -  Ampicillin/Sulbactam: S <=8/4      -  Cefazolin: S <=4      -  Clindamycin: S <=0.25      -  Erythromycin: S <=0.25      -  Gentamicin: S <=1 Should not be used as monotherapy      -  Oxacillin: S <=0.25      -  Penicillin: R >8      -  Rifampin: S <=1 Should not be used as monotherapy      -  Tetra/Doxy: S <=1      -  Trimethoprim/Sulfamethoxazole: S <=0.5/9.5      -  Vancomycin: S 1      Method Type: ДМИТРИЙ    Culture - Blood (collected 12 May 2022 22:10)  Source: .Blood None  Gram Stain (14 May 2022 02:05):    Growth in anaerobic bottle: Gram positive cocci in pairs  Final Report (15 May 2022 14:45):    Growth in anaerobic bottle: Staphylococcus aureus  Organism: Blood Culture PCR  Staphylococcus aureus (15 May 2022 14:45)  Organism: Staphylococcus aureus (15 May 2022 14:45)      -  Ampicillin/Sulbactam: S <=8/4      -  Cefazolin: S <=4      -  Clindamycin: S <=0.25      -  Erythromycin: S <=0.25      -  Gentamicin: S <=1 Should not be used as monotherapy      -  Oxacillin: S <=0.25      -  Penicillin: R 2      -  Rifampin: S <=1 Should not be used as monotherapy      -  Tetra/Doxy: S <=1      -  Trimethoprim/Sulfamethoxazole: S <=0.5/9.5      -  Vancomycin: S 1      Method Type: ДМИТРИЙ  Organism: Blood Culture PCR (15 May 2022 14:45)      -  Staphylococcus aureus: Detec Any isolate of Staphylococcus aureus from a blood culture is NOT considered a contaminant.      Method Type: PCR    Culture - Blood (collected 12 May 2022 22:10)  Source: .Blood None  Gram Stain (14 May 2022 17:07):    Growth in aerobic bottle: Gram Positive Cocci in Clusters    Growth in anaerobic bottle: Gram Positive Cocci in Clusters  Final Report (15 May 2022 16:52):    Growth in aerobic and anaerobic bottles: Staphylococcus aureus    See previous culture [id number]    Culture - Aspirate with Gram Stain (collected 12 May 2022 20:00)  Source: Joint Fl Joint Fluid  Preliminary Report (14 May 2022 12:39):    Few Staphylococcus aureus  Organism: Staphylococcus aureus (15 May 2022 09:20)  Organism: Staphylococcus aureus (15 May 2022 09:20)      -  Ampicillin/Sulbactam: S <=8/4      -  Cefazolin: S <=4      -  Clindamycin: S <=0.25      -  Erythromycin: S 0.5      -  Gentamicin: S <=1 Should not be used as monotherapy      -  Oxacillin: S 0.5      -  Penicillin: R 8      -  Rifampin: S <=1 Should not be used as monotherapy      -  Tetra/Doxy: S <=1      -  Trimethoprim/Sulfamethoxazole: S <=0.5/9.5      -  Vancomycin: S 2      Method Type: ДМИТРИЙ    Radiology: all available radiological tests reviewed    < from: Xray Chest 1 View- PORTABLE-Urgent (Xray Chest 1 View- PORTABLE-Urgent .) (05.12.22 @ 16:16) >  IMPRESSION: Right shoulder degeneration.  Persistent linear density at the right base medially.  < end of copied text >    Advanced directives addressed: full resuscitation

## 2022-05-19 LAB
ANION GAP SERPL CALC-SCNC: 5 MMOL/L — SIGNIFICANT CHANGE UP (ref 5–17)
BUN SERPL-MCNC: 29 MG/DL — HIGH (ref 7–23)
CALCIUM SERPL-MCNC: 8.2 MG/DL — LOW (ref 8.5–10.1)
CHLORIDE SERPL-SCNC: 101 MMOL/L — SIGNIFICANT CHANGE UP (ref 96–108)
CO2 SERPL-SCNC: 31 MMOL/L — SIGNIFICANT CHANGE UP (ref 22–31)
CREAT SERPL-MCNC: 1.14 MG/DL — SIGNIFICANT CHANGE UP (ref 0.5–1.3)
CRP SERPL-MCNC: 47 MG/L — HIGH
EGFR: 62 ML/MIN/1.73M2 — SIGNIFICANT CHANGE UP
ERYTHROCYTE [SEDIMENTATION RATE] IN BLOOD: 92 MM/HR — HIGH (ref 0–20)
GLUCOSE SERPL-MCNC: 99 MG/DL — SIGNIFICANT CHANGE UP (ref 70–99)
POTASSIUM SERPL-MCNC: 4.1 MMOL/L — SIGNIFICANT CHANGE UP (ref 3.5–5.3)
POTASSIUM SERPL-SCNC: 4.1 MMOL/L — SIGNIFICANT CHANGE UP (ref 3.5–5.3)
SARS-COV-2 RNA SPEC QL NAA+PROBE: SIGNIFICANT CHANGE UP
SODIUM SERPL-SCNC: 137 MMOL/L — SIGNIFICANT CHANGE UP (ref 135–145)

## 2022-05-19 PROCEDURE — 99232 SBSQ HOSP IP/OBS MODERATE 35: CPT

## 2022-05-19 PROCEDURE — 71045 X-RAY EXAM CHEST 1 VIEW: CPT | Mod: 26

## 2022-05-19 RX ADMIN — DOFETILIDE 250 MICROGRAM(S): 0.25 CAPSULE ORAL at 22:13

## 2022-05-19 RX ADMIN — Medication 100 MILLIGRAM(S): at 05:51

## 2022-05-19 RX ADMIN — Medication 100 MILLIGRAM(S): at 16:20

## 2022-05-19 RX ADMIN — APIXABAN 5 MILLIGRAM(S): 2.5 TABLET, FILM COATED ORAL at 09:23

## 2022-05-19 RX ADMIN — Medication 100 MILLIGRAM(S): at 22:13

## 2022-05-19 RX ADMIN — Medication 1 DROP(S): at 09:22

## 2022-05-19 RX ADMIN — QUETIAPINE FUMARATE 25 MILLIGRAM(S): 200 TABLET, FILM COATED ORAL at 22:14

## 2022-05-19 RX ADMIN — DOFETILIDE 250 MICROGRAM(S): 0.25 CAPSULE ORAL at 09:23

## 2022-05-19 RX ADMIN — APIXABAN 5 MILLIGRAM(S): 2.5 TABLET, FILM COATED ORAL at 22:14

## 2022-05-19 NOTE — ADVANCED PRACTICE NURSE CONSULT - ASSESSMENT
Pt identified and confirmed using name, , MR#, and acct#. With patient's permission, spoke with his wife, Nanette Bingham telephonically in front of patient. PICC placement procedure discussed and questions answered. Wife authorized consent with two nurses. Chart was reviewed. Pt prepped and draped in sterile fashion. Using strict aseptic technique that was maintained throughout procedure, performed hand hygiene, all team members maintained full barrier precautions, 1.5cc 1% lidocaine used subdermally. Using MST and guided by ultrasound, Navilyst PICC with PASV technology 4Fr, lot #[id number], left basilic vein accessed. PICC trimmed to 44cm and inserted. Brisk blood return observed and flushed with 30cc NS. Stat lock and gauze applied with dressings, end cap and Curos cap applied. All sharps accounted for and disposed of in sharps container. Pt tolerated procedure well. Minimal blood loss noted. Do Not Use Extremity band applied to left wrist. CXR performed to confirm placement and no pneumothorax. PICC line care information placed in chart. Report given to primary RN. 
This is a 87 year old male admitted to the hospital on 5/12/2022 for worsening right shoulder pain. PMHx: HLD, HTN, AFib, Prostate cancer.   Assessed patient on [address] and noted no open wounds. Bilateral lower extremities with dry scaling skin and Hemosiderin staining. Applying Dimethicone (Sween 24) to legs for moisturizer This can be done two times per day. Noted when lifting Right leg patient grimaced when touching heel. Did not note any redness, or discoloration indicating skin injury. Patient does not recall hitting or injuring heel. Placed foam boot on and will monitor. Bilateral gluteal cleft and intergluteal cleft with nonblanchable hyperpigmentation. Continue to turn and position every two hours and keep heels elevated. Apply No Sting Barrier to Gluteal cleft.

## 2022-05-19 NOTE — PROGRESS NOTE ADULT - SUBJECTIVE AND OBJECTIVE BOX
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   Pt endorsing weakness, chills, fatigue, non radiating, severe, throbbing, constant, not quantifiable on pain scale R shoulder pain which is exacerbated by pressure or movement and alleviated by ice/rest.   Denies sob, cough, chest pain, palpitations, nausea, vomiting, leg swelling, orthopnea, headache, abd pain, diarrhea, constipation, rash, paresthesias, changes in vision/hearing/speech/gait.   Of note, pt receives home care, home PT, and wound care for chronic venous stasis dermatitis which was recently treated for cellulitis. Pt arrived to ED in bilateral ACE wraps which were removed for evaluation.       5/13: Lying in bed, alert, mildly forgetful but comfortable, right shoulder tenderness.  Denies fever, chills, N, V, abd pain, CP, SOB.  5/14:  Confused, agitated, states he wants to go home.  Attempted to calm him down.  Spoke to daughter and updated her on current situation and plan of care for his shoulder infection.  5/15: Confused at times, alert otherwise, comfortable.  Found to have staph bacteremia, family aware.  05/16/22: Patient seen and examined. Denies any new complaints. Discussed with patient regarding management and d/c plan. Discussed with Dr Dowd.   05/17/22: Sitting in chair. No new issues.   05/18/22: Complaining of right shoulder pain. Discussed with wife yesterday regarding management and d/c plan.   05/19/22: No new issues.     REVIEW OF SYSTEMS: All other review of systems is negative unless indicated above.     Vital Signs Last 24 Hrs  T(C): 36.7 (19 May 2022 09:18), Max: 36.8 (18 May 2022 14:58)  T(F): 98.1 (19 May 2022 09:18), Max: 98.2 (18 May 2022 14:58)  HR: 78 (19 May 2022 09:18) (78 - 81)  BP: 130/69 (19 May 2022 09:18) (130/69 - 136/66)  BP(mean): --  RR: 16 (19 May 2022 09:18) (16 - 18)  SpO2: 98% (19 May 2022 09:18) (98% - 98%)      PHYSICAL EXAM:    Constitutional: NAD, awake and alert, confused  HEENT: PERR, EOMI, Normal Hearing, MMM  Neck: Soft and supple  Respiratory: Breath sounds are clear bilaterally, No wheezing, rales or rhonchi  Cardiovascular: S1 and S2, regular rate and rhythm, no Murmurs, gallops or rubs  Gastrointestinal: Bowel Sounds present, soft, nontender, nondistended, no guarding, no rebound  Extremities: No peripheral edema, right shoulder warm, erythematous, tender, and edematous   Neurological: A/O x 2, no focal deficits in my limited exam    MEDICATIONS  (STANDING):  atorvastatin 10 milliGRAM(s) Oral at bedtime  cefTRIAXone   IVPB 2000 milliGRAM(s) IV Intermittent every 24 hours  dofetilide 250 MICROGram(s) Oral two times a day  lactated ringers. 1000 milliLiter(s) (75 mL/Hr) IV Continuous <Continuous>  lidocaine   4% Patch 1 Patch Transdermal every 24 hours  timolol 0.25% Solution 1 Drop(s) Both EYES daily  vancomycin  IVPB 750 milliGRAM(s) IV Intermittent every 12 hours    MEDICATIONS  (PRN):  acetaminophen     Tablet .. 650 milliGRAM(s) Oral every 6 hours PRN Temp greater or equal to 38C (100.4F), Mild Pain (1 - 3)  ALBUTerol    90 MICROgram(s) HFA Inhaler 1 Puff(s) Inhalation every 6 hours PRN Shortness of Breath and/or Wheezing  oxyCODONE    IR 2.5 milliGRAM(s) Oral every 4 hours PRN Moderate Pain (4 - 6)  oxyCODONE    IR 5 milliGRAM(s) Oral every 4 hours PRN Severe Pain (7 - 10)  QUEtiapine 25 milliGRAM(s) Oral two times a day PRN agitation                                                           11.2   11.46 )-----------( 501      ( 19 May 2022 09:06 )             35.7     19 May 2022 09:05    137    |  101    |  29     ----------------------------<  99     4.1     |  31     |  1.14     Ca    8.2        19 May 2022 09:05    TPro  6.8    /  Alb  2.2    /  TBili  0.4    /  DBili  x      /  AST  136    /  ALT  91     /  AlkPhos  240    18 May 2022 08:00    LIVER FUNCTIONS - ( 18 May 2022 08:00 )  Alb: 2.2 g/dL / Pro: 6.8 gm/dL / ALK PHOS: 240 U/L / ALT: 91 U/L / AST: 136 U/L / GGT: x                        Assessment/Plan: 88 yo male with a pmh/o Anemia, HLD, Afib, R Renal lesion, diverticulitis, 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 Admitted due to:    #Metabolic encephalopathy-resolved  right should pain due to septic arthritis with staph aureus bacteremia.  - Continue IV ceftriaxone and vanco per ID  - Bcx: Staph Aureus, follow repeat blood cultures. Will need long term IV antibiotics  - s/p joint aspiration x 2, most recent 1-2cc removed by IR on 5/13  - joint fluid: Staph Aureus   - ortho following, s/p I+D on 5/15  - Echocardiogram: no vegetations  - Pain control with tylenol and lidoderm patch  - Continue PT   - Seroquel oral PRN agitation  - monitor transaminitis, could be due to infection. Hold lipitor  Stable    #Chronic anemia: STABLE    #Severe Protein calorie malnutrition:  - nutrition consult appreciated    #HLD:  - c/w statin    #Paroxysmal Afib:  - xarelto  - c/w tikosyn    #chronic lower extremity Venous stasis dermatitis:  - no active extremity infection  - Wound care   - elevate extremities    DVT ppx:  - xarelto    Disposition: PICC tomorrow, needs rehab placement    Assessment and Plan:   Nutritional Assessment:  · Nutritional Assessment	This patient has been assessed with a concern for Malnutrition and has been determined to have a diagnosis/diagnoses of Severe protein-calorie malnutrition.    This patient is being managed with:   Diet NPO after Midnight-     NPO Start Date: 12-May-2022   NPO Start Time: 23:59  Except Medications  Entered: May 12 2022  8:07PM

## 2022-05-19 NOTE — PROGRESS NOTE ADULT - SUBJECTIVE AND OBJECTIVE BOX
Patient seen and examined at bedside. Patient reports continued right shoulder pain. No other acute complaints at this time. OR Cultures growing staph aureus. Pain well controlled. Denies chest pain, shortness of breath, nausea or vomiting.     Vital Signs Last 24 Hrs  T(C): 36.8 (18 May 2022 14:58), Max: 36.9 (18 May 2022 07:32)  T(F): 98.2 (18 May 2022 14:58), Max: 98.4 (18 May 2022 07:32)  HR: 81 (18 May 2022 14:58) (74 - 81)  BP: 136/66 (18 May 2022 14:58) (136/66 - 150/62)  BP(mean): --  RR: 18 (18 May 2022 14:58) (18 - 18)  SpO2: 98% (18 May 2022 14:58) (97% - 98%)    PE:    General: NAD, resting comfortably in bed  RUE:   Dressing C/D/I   Drain intact  continued area of swelling and erythema over AC joint  Active/passive shoulder ROM limited 2/2 pain  Compartments soft and compressible  No calf tenderness bilaterally  +Axillary/Musculocutaneous/Radial/Median/AIN/PIN intact  SILT C5-T1  2+ radial pulses      A/P:  87y m s/p R GH and AC Joint I&D POD 4    -PICC line today, then plan for discharge; drain to be removed on day of discharge  -PT/OT -WBAT RUE  -Pain Control  -DVT ppx per medical team  -Antibiotics per ID  -FU AM Labs  -Rest, ice, compress and elevate the extremity as we needed  -Incentive Spirometry  -Medical management appreciated  -Ortho stable for discharge  -OR Cultures growing staph aureus, follow for sensitivities   -Aspiration and blood cultures growing staph MSSA Patient seen and examined at bedside. Patient reports continued right shoulder pain. No other acute complaints at this time. OR Cultures growing staph aureus. Pain well controlled. Denies chest pain, shortness of breath, nausea or vomiting.     Vital Signs Last 24 Hrs  T(C): 36.8 (18 May 2022 14:58), Max: 36.9 (18 May 2022 07:32)  T(F): 98.2 (18 May 2022 14:58), Max: 98.4 (18 May 2022 07:32)  HR: 81 (18 May 2022 14:58) (74 - 81)  BP: 136/66 (18 May 2022 14:58) (136/66 - 150/62)  BP(mean): --  RR: 18 (18 May 2022 14:58) (18 - 18)  SpO2: 98% (18 May 2022 14:58) (97% - 98%)    PE:    General: NAD, resting comfortably in bed  RUE:   Dressing C/D/I   continued area of swelling and erythema over AC joint  Active/passive shoulder ROM limited 2/2 pain  Compartments soft and compressible  No calf tenderness bilaterally  +Axillary/Musculocutaneous/Radial/Median/AIN/PIN intact  SILT C5-T1  2+ radial pulses      A/P:  87y m s/p R GH and AC Joint I&D POD 4    -PICC line today, then plan for discharge  -Drain DCed  -PT/OT -WBAT RUE  -Pain Control  -DVT ppx per medical team  -Antibiotics per ID  -FU AM Labs  -Rest, ice, compress and elevate the extremity as we needed  -Incentive Spirometry  -Medical management appreciated  -Ortho stable for discharge  -OR Cultures growing staph aureus, follow for sensitivities   -Aspiration and blood cultures growing staph MSSA

## 2022-05-19 NOTE — PROGRESS NOTE ADULT - SUBJECTIVE AND OBJECTIVE BOX
Date of service: 05-19-22 @ 08:43    Lying in bed in NAD  Has right shoulder local pain  Swelling is down  Has local erythema    ROS: no fever or chills; denies dizziness, no HA, no SOB or cough, no abdominal pain, no diarrhea or constipation; no dysuria, no legs pain,    MEDICATIONS  (STANDING):  apixaban 5 milliGRAM(s) Oral two times a day  ceFAZolin   IVPB 2000 milliGRAM(s) IV Intermittent every 8 hours  dofetilide 250 MICROGram(s) Oral two times a day  lactated ringers. 1000 milliLiter(s) (75 mL/Hr) IV Continuous <Continuous>  lidocaine   4% Patch 1 Patch Transdermal every 24 hours  timolol 0.25% Solution 1 Drop(s) Both EYES daily    Vital Signs Last 24 Hrs  T(C): 36.8 (18 May 2022 14:58), Max: 36.8 (18 May 2022 14:58)  T(F): 98.2 (18 May 2022 14:58), Max: 98.2 (18 May 2022 14:58)  HR: 81 (18 May 2022 14:58) (81 - 81)  BP: 136/66 (18 May 2022 14:58) (136/66 - 136/66)  BP(mean): --  RR: 18 (18 May 2022 14:58) (18 - 18)  SpO2: 98% (18 May 2022 14:58) (98% - 98%)     Physical exam:    Constitutional:  No acute distress  HEENT: NC/AT, EOMI, PERRLA, conjunctivae clear; ears and nose atraumatic  Neck: supple; thyroid not palpable  Back: no tenderness  Respiratory: respiratory effort normal; clear to auscultation  Cardiovascular: S1S2 regular, no murmurs  Abdomen: soft, not tender, not distended, positive BS  Genitourinary: no suprapubic tenderness  Lymphatic: no LN palpable  Musculoskeletal: no muscle tenderness, no joint swelling or tenderness  Skin intact with significant swelling and erythema overlying AC joint, diffuse mild swelling about the shoulder. TTP over AC joint; erythema is improving  Extremities: 1+ pedal edema  No open wounds. Bilateral lower extremities with dry scaling skin  Neurological/ Psychiatric: AxOx3, judgement and insight normal; moving all extremities  Skin: no rashes; no palpable lesions    Labs: reviewed                        11.2   10.10 )-----------( 534      ( 18 May 2022 08:00 )             35.3     05-18    137  |  101  |  15  ----------------------------<  94  3.9   |  32<H>  |  0.60    Ca    8.7      18 May 2022 08:00    TPro  6.8  /  Alb  2.2<L>  /  TBili  0.4  /  DBili  x   /  AST  136<H>  /  ALT  91<H>  /  AlkPhos  240<H>  05-18    C-Reactive Protein, Serum: 129 mg/L (05-16-22 @ 08:41)  C-Reactive Protein, Serum: 247 mg/L (05-12-22 @ 16:28)                        10.7   11.13 )-----------( 516      ( 17 May 2022 09:18 )             34.4     05-16    137  |  103  |  18  ----------------------------<  133<H>  5.0   |  28  |  0.62    Ca    8.5      16 May 2022 08:41    TPro  6.7  /  Alb  1.9<L>  /  TBili  0.4  /  DBili  x   /  AST  131<H>  /  ALT  85<H>  /  AlkPhos  192<H>  05-16    C-Reactive Protein, Serum: 129 mg/L (05-16-22 @ 08:41)  C-Reactive Protein, Serum: 247 mg/L (05-12-22 @ 16:28)                        11.4   11.97 )-----------( 278      ( 13 May 2022 07:57 )             37.3     05-13    138  |  103  |  24<H>  ----------------------------<  109<H>  4.3   |  26  |  0.83    Ca    8.6      13 May 2022 07:57    TPro  6.9  /  Alb  2.2<L>  /  TBili  0.8  /  DBili  x   /  AST  74<H>  /  ALT  47  /  AlkPhos  163<H>  05-13     LIVER FUNCTIONS - ( 13 May 2022 07:57 )  Alb: 2.2 g/dL / Pro: 6.9 gm/dL / ALK PHOS: 163 U/L / ALT: 47 U/L / AST: 74 U/L / GGT: x           COVID-19 PCR: NotDetec (05-12-22 @ 15:15)      Culture - Joint Fluid (collected 15 May 2022 09:45)  Source: Joint Fl ACROMIOCLAVICULAR JOINT  Gram Stain (15 May 2022 23:02):    No polymorphonuclear leukocytes seen    No organisms seen    Culture - Joint Fluid (collected 15 May 2022 09:45)  Source: Joint Fl SUBACROMIAL SPACE  Gram Stain (15 May 2022 22:57):    Few polymorphonuclear leukocytes per low power field    No organisms seen    Culture - Joint Fluid (collected 15 May 2022 09:45)  Source: Joint Fl None  Gram Stain (15 May 2022 22:59):    No polymorphonuclear leukocytes seen    No organisms seen    Culture - Aspirate with Gram Stain (collected 13 May 2022 14:30)  Source: .Aspirate RIGHT SHOULDER ASPIRATION  Preliminary Report (14 May 2022 20:12):    Few Staphylococcus aureus  Organism: Staphylococcus aureus (15 May 2022 17:42)  Organism: Staphylococcus aureus (15 May 2022 17:42)      -  Ampicillin/Sulbactam: S <=8/4      -  Cefazolin: S <=4      -  Clindamycin: S <=0.25      -  Erythromycin: S <=0.25      -  Gentamicin: S <=1 Should not be used as monotherapy      -  Oxacillin: S <=0.25      -  Penicillin: R >8      -  Rifampin: S <=1 Should not be used as monotherapy      -  Tetra/Doxy: S <=1      -  Trimethoprim/Sulfamethoxazole: S <=0.5/9.5      -  Vancomycin: S 1      Method Type: ДМИТРИЙ    Culture - Blood (collected 12 May 2022 22:10)  Source: .Blood None  Gram Stain (14 May 2022 02:05):    Growth in anaerobic bottle: Gram positive cocci in pairs  Final Report (15 May 2022 14:45):    Growth in anaerobic bottle: Staphylococcus aureus  Organism: Blood Culture PCR  Staphylococcus aureus (15 May 2022 14:45)  Organism: Staphylococcus aureus (15 May 2022 14:45)      -  Ampicillin/Sulbactam: S <=8/4      -  Cefazolin: S <=4      -  Clindamycin: S <=0.25      -  Erythromycin: S <=0.25      -  Gentamicin: S <=1 Should not be used as monotherapy      -  Oxacillin: S <=0.25      -  Penicillin: R 2      -  Rifampin: S <=1 Should not be used as monotherapy      -  Tetra/Doxy: S <=1      -  Trimethoprim/Sulfamethoxazole: S <=0.5/9.5      -  Vancomycin: S 1      Method Type: ДМИТРИЙ  Organism: Blood Culture PCR (15 May 2022 14:45)      -  Staphylococcus aureus: Detec Any isolate of Staphylococcus aureus from a blood culture is NOT considered a contaminant.      Method Type: PCR    Culture - Blood (collected 12 May 2022 22:10)  Source: .Blood None  Gram Stain (14 May 2022 17:07):    Growth in aerobic bottle: Gram Positive Cocci in Clusters    Growth in anaerobic bottle: Gram Positive Cocci in Clusters  Final Report (15 May 2022 16:52):    Growth in aerobic and anaerobic bottles: Staphylococcus aureus    See previous culture [id number]    Culture - Aspirate with Gram Stain (collected 12 May 2022 20:00)  Source: Joint Fl Joint Fluid  Preliminary Report (14 May 2022 12:39):    Few Staphylococcus aureus  Organism: Staphylococcus aureus (15 May 2022 09:20)  Organism: Staphylococcus aureus (15 May 2022 09:20)      -  Ampicillin/Sulbactam: S <=8/4      -  Cefazolin: S <=4      -  Clindamycin: S <=0.25      -  Erythromycin: S 0.5      -  Gentamicin: S <=1 Should not be used as monotherapy      -  Oxacillin: S 0.5      -  Penicillin: R 8      -  Rifampin: S <=1 Should not be used as monotherapy      -  Tetra/Doxy: S <=1      -  Trimethoprim/Sulfamethoxazole: S <=0.5/9.5      -  Vancomycin: S 2      Method Type: ДМИТРИЙ    Radiology: all available radiological tests reviewed    < from: Xray Chest 1 View- PORTABLE-Urgent (Xray Chest 1 View- PORTABLE-Urgent .) (05.12.22 @ 16:16) >  IMPRESSION: Right shoulder degeneration.  Persistent linear density at the right base medially.  < end of copied text >    Advanced directives addressed: full resuscitation

## 2022-05-20 ENCOUNTER — TRANSCRIPTION ENCOUNTER (OUTPATIENT)
Age: 87
End: 2022-05-20

## 2022-05-20 VITALS
DIASTOLIC BLOOD PRESSURE: 45 MMHG | TEMPERATURE: 98 F | HEART RATE: 72 BPM | SYSTOLIC BLOOD PRESSURE: 104 MMHG | OXYGEN SATURATION: 98 % | RESPIRATION RATE: 18 BRPM

## 2022-05-20 LAB
ANION GAP SERPL CALC-SCNC: 7 MMOL/L — SIGNIFICANT CHANGE UP (ref 5–17)
BUN SERPL-MCNC: 32 MG/DL — HIGH (ref 7–23)
CALCIUM SERPL-MCNC: 8.2 MG/DL — LOW (ref 8.5–10.1)
CHLORIDE SERPL-SCNC: 103 MMOL/L — SIGNIFICANT CHANGE UP (ref 96–108)
CO2 SERPL-SCNC: 30 MMOL/L — SIGNIFICANT CHANGE UP (ref 22–31)
CREAT SERPL-MCNC: 1.19 MG/DL — SIGNIFICANT CHANGE UP (ref 0.5–1.3)
EGFR: 59 ML/MIN/1.73M2 — LOW
GLUCOSE SERPL-MCNC: 98 MG/DL — SIGNIFICANT CHANGE UP (ref 70–99)
HCT VFR BLD CALC: 34.9 % — LOW (ref 39–50)
HGB BLD-MCNC: 10.9 G/DL — LOW (ref 13–17)
MCHC RBC-ENTMCNC: 28.6 PG — SIGNIFICANT CHANGE UP (ref 27–34)
MCHC RBC-ENTMCNC: 31.2 GM/DL — LOW (ref 32–36)
MCV RBC AUTO: 91.6 FL — SIGNIFICANT CHANGE UP (ref 80–100)
PLATELET # BLD AUTO: 445 K/UL — HIGH (ref 150–400)
POTASSIUM SERPL-MCNC: 3.9 MMOL/L — SIGNIFICANT CHANGE UP (ref 3.5–5.3)
POTASSIUM SERPL-SCNC: 3.9 MMOL/L — SIGNIFICANT CHANGE UP (ref 3.5–5.3)
RBC # BLD: 3.81 M/UL — LOW (ref 4.2–5.8)
RBC # FLD: 15.1 % — HIGH (ref 10.3–14.5)
SODIUM SERPL-SCNC: 140 MMOL/L — SIGNIFICANT CHANGE UP (ref 135–145)
WBC # BLD: 9.05 K/UL — SIGNIFICANT CHANGE UP (ref 3.8–10.5)
WBC # FLD AUTO: 9.05 K/UL — SIGNIFICANT CHANGE UP (ref 3.8–10.5)

## 2022-05-20 PROCEDURE — 99239 HOSP IP/OBS DSCHRG MGMT >30: CPT

## 2022-05-20 RX ORDER — CEFAZOLIN SODIUM 1 G
2 VIAL (EA) INJECTION
Qty: 0 | Refills: 0 | DISCHARGE
Start: 2022-05-20 | End: 2022-06-25

## 2022-05-20 RX ORDER — OXYCODONE HYDROCHLORIDE 5 MG/1
1 TABLET ORAL
Qty: 0 | Refills: 0 | DISCHARGE
Start: 2022-05-20

## 2022-05-20 RX ADMIN — Medication 1 DROP(S): at 11:20

## 2022-05-20 RX ADMIN — APIXABAN 5 MILLIGRAM(S): 2.5 TABLET, FILM COATED ORAL at 11:20

## 2022-05-20 RX ADMIN — Medication 100 MILLIGRAM(S): at 04:50

## 2022-05-20 RX ADMIN — Medication 100 MILLIGRAM(S): at 14:10

## 2022-05-20 RX ADMIN — OXYCODONE HYDROCHLORIDE 2.5 MILLIGRAM(S): 5 TABLET ORAL at 04:50

## 2022-05-20 RX ADMIN — OXYCODONE HYDROCHLORIDE 2.5 MILLIGRAM(S): 5 TABLET ORAL at 05:47

## 2022-05-20 RX ADMIN — DOFETILIDE 250 MICROGRAM(S): 0.25 CAPSULE ORAL at 11:20

## 2022-05-20 NOTE — PROGRESS NOTE ADULT - REASON FOR ADMISSION
Acute encephalopathy secondary to Septic arthritis

## 2022-05-20 NOTE — PROGRESS NOTE ADULT - SUBJECTIVE AND OBJECTIVE BOX
Date of service: 05-20-22 @ 10:07    Lying in bed in NAD  Has right shoulder discomfort  No fever    ROS: no fever or chills; denies dizziness, no HA, no SOB or cough, no abdominal pain, no diarrhea or constipation; no dysuria, no legs pain    MEDICATIONS  (STANDING):  apixaban 5 milliGRAM(s) Oral two times a day  ceFAZolin   IVPB 2000 milliGRAM(s) IV Intermittent every 8 hours  dofetilide 250 MICROGram(s) Oral two times a day  lactated ringers. 1000 milliLiter(s) (75 mL/Hr) IV Continuous <Continuous>  lidocaine   4% Patch 1 Patch Transdermal every 24 hours  timolol 0.25% Solution 1 Drop(s) Both EYES daily    Vital Signs Last 24 Hrs  T(C): 36.9 (20 May 2022 07:27), Max: 36.9 (20 May 2022 07:27)  T(F): 98.5 (20 May 2022 07:27), Max: 98.5 (20 May 2022 07:27)  HR: 72 (20 May 2022 07:27) (72 - 89)  BP: 104/45 (20 May 2022 07:27) (104/45 - 118/73)  BP(mean): --  RR: 18 (20 May 2022 07:27) (18 - 18)  SpO2: 98% (20 May 2022 07:27) (96% - 98%)     Physical exam:    Constitutional:  No acute distress  HEENT: NC/AT, EOMI, PERRLA, conjunctivae clear; ears and nose atraumatic  Neck: supple; thyroid not palpable  Back: no tenderness  Respiratory: respiratory effort normal; clear to auscultation  Cardiovascular: S1S2 regular, no murmurs  Abdomen: soft, not tender, not distended, positive BS  Genitourinary: no suprapubic tenderness  Lymphatic: no LN palpable  Musculoskeletal: no muscle tenderness, no joint swelling or tenderness  Skin intact with significant swelling and erythema overlying AC joint, diffuse mild swelling about the shoulder. TTP over AC joint; erythema is improving  Extremities: 1+ pedal edema  No open wounds. Bilateral lower extremities with dry scaling skin  Neurological/ Psychiatric: AxOx3, judgement and insight normal; moving all extremities  Skin: no rashes; no palpable lesions    Labs: reviewed                        10.9   9.05  )-----------( 445      ( 20 May 2022 08:17 )             34.9     05-20    140  |  103  |  32<H>  ----------------------------<  98  3.9   |  30  |  1.19    Ca    8.2<L>      20 May 2022 08:17      C-Reactive Protein, Serum: 47 mg/L (05-19-22 @ 09:06)  C-Reactive Protein, Serum: 129 mg/L (05-16-22 @ 08:41)  C-Reactive Protein, Serum: 247 mg/L (05-12-22 @ 16:28)                        11.2   10.10 )-----------( 534      ( 18 May 2022 08:00 )             35.3     05-18    137  |  101  |  15  ----------------------------<  94  3.9   |  32<H>  |  0.60    Ca    8.7      18 May 2022 08:00    TPro  6.8  /  Alb  2.2<L>  /  TBili  0.4  /  DBili  x   /  AST  136<H>  /  ALT  91<H>  /  AlkPhos  240<H>  05-18    C-Reactive Protein, Serum: 129 mg/L (05-16-22 @ 08:41)  C-Reactive Protein, Serum: 247 mg/L (05-12-22 @ 16:28)                        10.7   11.13 )-----------( 516      ( 17 May 2022 09:18 )             34.4     05-16    137  |  103  |  18  ----------------------------<  133<H>  5.0   |  28  |  0.62    Ca    8.5      16 May 2022 08:41    TPro  6.7  /  Alb  1.9<L>  /  TBili  0.4  /  DBili  x   /  AST  131<H>  /  ALT  85<H>  /  AlkPhos  192<H>  05-16    C-Reactive Protein, Serum: 129 mg/L (05-16-22 @ 08:41)  C-Reactive Protein, Serum: 247 mg/L (05-12-22 @ 16:28)                        11.4   11.97 )-----------( 278      ( 13 May 2022 07:57 )             37.3     05-13    138  |  103  |  24<H>  ----------------------------<  109<H>  4.3   |  26  |  0.83    Ca    8.6      13 May 2022 07:57    TPro  6.9  /  Alb  2.2<L>  /  TBili  0.8  /  DBili  x   /  AST  74<H>  /  ALT  47  /  AlkPhos  163<H>  05-13     LIVER FUNCTIONS - ( 13 May 2022 07:57 )  Alb: 2.2 g/dL / Pro: 6.9 gm/dL / ALK PHOS: 163 U/L / ALT: 47 U/L / AST: 74 U/L / GGT: x           COVID-19 PCR: NotDetec (05-12-22 @ 15:15)      Culture - Joint Fluid (collected 15 May 2022 09:45)  Source: Joint Fl ACROMIOCLAVICULAR JOINT  Gram Stain (15 May 2022 23:02):    No polymorphonuclear leukocytes seen    No organisms seen    Culture - Joint Fluid (collected 15 May 2022 09:45)  Source: Joint Fl SUBACROMIAL SPACE  Gram Stain (15 May 2022 22:57):    Few polymorphonuclear leukocytes per low power field    No organisms seen    Culture - Joint Fluid (collected 15 May 2022 09:45)  Source: Joint Fl None  Gram Stain (15 May 2022 22:59):    No polymorphonuclear leukocytes seen    No organisms seen    Culture - Aspirate with Gram Stain (collected 13 May 2022 14:30)  Source: .Aspirate RIGHT SHOULDER ASPIRATION  Preliminary Report (14 May 2022 20:12):    Few Staphylococcus aureus  Organism: Staphylococcus aureus (15 May 2022 17:42)  Organism: Staphylococcus aureus (15 May 2022 17:42)      -  Ampicillin/Sulbactam: S <=8/4      -  Cefazolin: S <=4      -  Clindamycin: S <=0.25      -  Erythromycin: S <=0.25      -  Gentamicin: S <=1 Should not be used as monotherapy      -  Oxacillin: S <=0.25      -  Penicillin: R >8      -  Rifampin: S <=1 Should not be used as monotherapy      -  Tetra/Doxy: S <=1      -  Trimethoprim/Sulfamethoxazole: S <=0.5/9.5      -  Vancomycin: S 1      Method Type: ДМИТРИЙ    Culture - Blood (collected 12 May 2022 22:10)  Source: .Blood None  Gram Stain (14 May 2022 02:05):    Growth in anaerobic bottle: Gram positive cocci in pairs  Final Report (15 May 2022 14:45):    Growth in anaerobic bottle: Staphylococcus aureus  Organism: Blood Culture PCR  Staphylococcus aureus (15 May 2022 14:45)  Organism: Staphylococcus aureus (15 May 2022 14:45)      -  Ampicillin/Sulbactam: S <=8/4      -  Cefazolin: S <=4      -  Clindamycin: S <=0.25      -  Erythromycin: S <=0.25      -  Gentamicin: S <=1 Should not be used as monotherapy      -  Oxacillin: S <=0.25      -  Penicillin: R 2      -  Rifampin: S <=1 Should not be used as monotherapy      -  Tetra/Doxy: S <=1      -  Trimethoprim/Sulfamethoxazole: S <=0.5/9.5      -  Vancomycin: S 1      Method Type: ДМИТРИЙ  Organism: Blood Culture PCR (15 May 2022 14:45)      -  Staphylococcus aureus: Detec Any isolate of Staphylococcus aureus from a blood culture is NOT considered a contaminant.      Method Type: PCR    Culture - Blood (collected 12 May 2022 22:10)  Source: .Blood None  Gram Stain (14 May 2022 17:07):    Growth in aerobic bottle: Gram Positive Cocci in Clusters    Growth in anaerobic bottle: Gram Positive Cocci in Clusters  Final Report (15 May 2022 16:52):    Growth in aerobic and anaerobic bottles: Staphylococcus aureus    See previous culture [id number]    Culture - Aspirate with Gram Stain (collected 12 May 2022 20:00)  Source: Joint Fl Joint Fluid  Preliminary Report (14 May 2022 12:39):    Few Staphylococcus aureus  Organism: Staphylococcus aureus (15 May 2022 09:20)  Organism: Staphylococcus aureus (15 May 2022 09:20)      -  Ampicillin/Sulbactam: S <=8/4      -  Cefazolin: S <=4      -  Clindamycin: S <=0.25      -  Erythromycin: S 0.5      -  Gentamicin: S <=1 Should not be used as monotherapy      -  Oxacillin: S 0.5      -  Penicillin: R 8      -  Rifampin: S <=1 Should not be used as monotherapy      -  Tetra/Doxy: S <=1      -  Trimethoprim/Sulfamethoxazole: S <=0.5/9.5      -  Vancomycin: S 2      Method Type: ДМИТРИЙ    Radiology: all available radiological tests reviewed    < from: Xray Chest 1 View- PORTABLE-Urgent (Xray Chest 1 View- PORTABLE-Urgent .) (05.12.22 @ 16:16) >  IMPRESSION: Right shoulder degeneration.  Persistent linear density at the right base medially.  < end of copied text >    Advanced directives addressed: full resuscitation

## 2022-05-20 NOTE — PROGRESS NOTE ADULT - PROVIDER SPECIALTY LIST ADULT
Hospitalist
Orthopedics
Hospitalist
Infectious Disease
Orthopedics
Hospitalist
Hospitalist
Infectious Disease
Orthopedics
Orthopedics
Infectious Disease

## 2022-05-20 NOTE — DISCHARGE NOTE NURSING/CASE MANAGEMENT/SOCIAL WORK - NSDCPEFALRISK_GEN_ALL_CORE
For information on Fall & Injury Prevention, visit: [email or web address] OR  [email or web address] OR  https://www.cdc.gov/steadi/patient.html

## 2022-05-20 NOTE — DISCHARGE NOTE NURSING/CASE MANAGEMENT/SOCIAL WORK - PATIENT PORTAL LINK FT
You can access the FollowMyHealth Patient Portal offered by [facility] by registering at the following website: [email or web address]. By joining [email or web address] FollowMyHealth portal, you will also be able to view your health information using other applications (apps) compatible with our system.

## 2022-05-20 NOTE — PROGRESS NOTE ADULT - ASSESSMENT
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 Pt is endorsing weakness, chills, fatigue, non radiating, severe, throbbing, constant, not quantifiable on pain scale R shoulder pain which is exacerbated by pressure or movement and alleviated by ice/rest. No fever or chills reported at home. In ER he received zosyn.     1. Sepsis with MSSA improving. Right shoulder OA with pain. Right shoulder aspect cellulitis with underlying right AC joint septic arthritis with MSSA s/p surgery.   -b/l lower legs chronic stasis dermatitis; doubt cellulitis  -leukocytosis  -repeat BC x 2 collected  -ortho evaluation appreciated - s/p surgery  -s/p ceftriaxone 2 gm IV qd and vancomycin 750 mg IV q12h # 3  -on cefazolin 2 gm IV q8h # 4  -tolerating abx well so far; no side effects noted  -f/u cultures  -continue IV abx coverage   -plan for PICC line   -will need 5 more weeks of longer term IV abx therapy  -home IV abx setup in progress; case reviewed with case management; orders reviewed and called in to pharmacist with infusion company; awaiting insurance approval  -monitor temps  -f/u CBC  -supportive care  2. Other issues:   -care per medicine    d/w medical team  
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 Pt is endorsing weakness, chills, fatigue, non radiating, severe, throbbing, constant, not quantifiable on pain scale R shoulder pain which is exacerbated by pressure or movement and alleviated by ice/rest. No fever or chills reported at home. In ER he received zosyn.     1. Sepsis with MSSA. Right shoulder OA with pain. Right shoulder aspect cellulitis with underlying right AC joint septic arthritis with MSSA s/p surgery.   -b/l lower legs chronic stasis dermatitis; doubt cellulitis  -leukocytosis  -BC x 2 noted  -ortho evaluation appreciated - s/p surgery  -on ceftriaxone 2 gm IV qd and vancomycin 750 mg IV q12h # 3  -tolerating abx well so far; no side effects noted  -vancomycin trough level is therapeutic  -change abx to cefazolin 2 gm IV q8h  -reason for abx use and side effects reviewed with patient; monitor BMP   -repeat BC x 2 in AM  -monitor temps  -f/u CBC  -supportive care  2. Other issues:   -care per medicine    d/w Dr. Broderick  
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 Pt is endorsing weakness, chills, fatigue, non radiating, severe, throbbing, constant, not quantifiable on pain scale R shoulder pain which is exacerbated by pressure or movement and alleviated by ice/rest. No fever or chills reported at home. In ER he received zosyn.     1. Sepsis with MSSA. Right shoulder OA with pain. Right shoulder aspect cellulitis with underlying right AC joint septic arthritis with MSSA s/p surgery.   -b/l lower legs chronic stasis dermatitis; doubt cellulitis  -leukocytosis  -repeat BC x 2 collected  -ortho evaluation appreciated - s/p surgery  -s/p ceftriaxone 2 gm IV qd and vancomycin 750 mg IV q12h # 3  -on cefazolin 2 gm IV q8h # 2  -tolerating abx well so far; no side effects noted  -f/u cultures  -continue IV abx coverage   -will need longer term IV abx therapy  -monitor temps  -f/u CBC  -supportive care  2. Other issues:   -care per medicine    d/w Dr. Broderick  
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 Pt is endorsing weakness, chills, fatigue, non radiating, severe, throbbing, constant, not quantifiable on pain scale R shoulder pain which is exacerbated by pressure or movement and alleviated by ice/rest. No fever or chills reported at home. In ER he received zosyn.     1. Sepsis with MSSA improving. Right shoulder OA with pain. Right shoulder aspect cellulitis with underlying right AC joint septic arthritis with MSSA s/p surgery.   -b/l lower legs chronic stasis dermatitis; doubt cellulitis  -leukocytosis  -repeat BC x 2 collected  -ortho evaluation appreciated - s/p surgery  -s/p ceftriaxone 2 gm IV qd and vancomycin 750 mg IV q12h # 3  -on cefazolin 2 gm IV q8h # 5  -tolerating abx well so far; no side effects noted  -f/u cultures  -continue IV abx coverage   -plan for PICC line   -will need 5 more weeks of longer term IV abx therapy  -home IV abx setup in progress; case reviewed with case management; orders reviewed and called in to pharmacist with infusion company; awaiting insurance approval  -plan t o go to rehab  -monitor temps  -f/u CBC  -supportive care  2. Other issues:   -care per medicine    d/w medical team and Dr. Broderick  
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 Pt is endorsing weakness, chills, fatigue, non radiating, severe, throbbing, constant, not quantifiable on pain scale R shoulder pain which is exacerbated by pressure or movement and alleviated by ice/rest. No fever or chills reported at home. In ER he received zosyn.     1. Sepsis with MSSA improving. Right shoulder OA with pain. Right shoulder aspect cellulitis with underlying right AC joint septic arthritis with MSSA s/p surgery.   -b/l lower legs chronic stasis dermatitis; doubt cellulitis  -leukocytosis  -repeat BC x 2 collected  -ortho evaluation appreciated - s/p surgery  -s/p ceftriaxone 2 gm IV qd and vancomycin 750 mg IV q12h # 3  -on cefazolin 2 gm IV q8h # 3  -tolerating abx well so far; no side effects noted  -f/u cultures  -continue IV abx coverage   -plan for PICC line tomorrow  -will need longer term IV abx therapy  -monitor temps  -f/u CBC  -supportive care  2. Other issues:   -care per medicine    d/w Dr. Broderick

## 2022-05-20 NOTE — PROGRESS NOTE ADULT - NUTRITIONAL ASSESSMENT
This patient has been assessed with a concern for Malnutrition and has been determined to have a diagnosis/diagnoses of Severe protein-calorie malnutrition.    This patient is being managed with:   Diet Regular-  Entered: May 13 2022  5:16PM    
This patient has been assessed with a concern for Malnutrition and has been determined to have a diagnosis/diagnoses of Severe protein-calorie malnutrition.    This patient is being managed with:   Diet Regular-  Entered: May 14 2022  3:42PM    
This patient has been assessed with a concern for Malnutrition and has been determined to have a diagnosis/diagnoses of Severe protein-calorie malnutrition.    This patient is being managed with:   Diet NPO after Midnight-     NPO Start Date: 12-May-2022   NPO Start Time: 23:59  Except Medications  Entered: May 12 2022  8:07PM    
This patient has been assessed with a concern for Malnutrition and has been determined to have a diagnosis/diagnoses of Severe protein-calorie malnutrition.    This patient is being managed with:   Diet Regular-  Entered: May 13 2022  5:16PM    
This patient has been assessed with a concern for Malnutrition and has been determined to have a diagnosis/diagnoses of Severe protein-calorie malnutrition.    This patient is being managed with:   Diet Regular-  Entered: May 13 2022  5:16PM    
This patient has been assessed with a concern for Malnutrition and has been determined to have a diagnosis/diagnoses of Severe protein-calorie malnutrition.    This patient is being managed with:   Diet Regular-  Entered: May 14 2022  3:42PM    

## 2022-05-20 NOTE — PROGRESS NOTE ADULT - SUBJECTIVE AND OBJECTIVE BOX
Patient seen and examined at bedside. PICC line placed in LUE yesterday.  Patient reports continued right shoulder pain. No other acute complaints at this time. OR Cultures growing staph aureus. Pain well controlled. Denies chest pain, shortness of breath, nausea or vomiting.       VITAL SIGNS:  T(C): 36.6 (05-19-22 @ 22:19), Max: 36.8 (05-19-22 @ 15:02)  HR: 89 (05-19-22 @ 22:19) (77 - 89)  BP: 118/62 (05-19-22 @ 22:19) (118/62 - 130/69)  RR: 18 (05-19-22 @ 22:19) (16 - 18)  SpO2: 96% (05-19-22 @ 22:19) (96% - 98%)      LABS:                        11.2   11.46 )-----------( 501      ( 19 May 2022 09:06 )             35.7     05-19    137  |  101  |  29<H>  ----------------------------<  99  4.1   |  31  |  1.14    Ca    8.2<L>      19 May 2022 09:05    TPro  6.8  /  Alb  2.2<L>  /  TBili  0.4  /  DBili  x   /  AST  136<H>  /  ALT  91<H>  /  AlkPhos  240<H>  05-18                PE:    General: NAD, resting comfortably in bed  RUE:   Dressing C/D/I   continued area of swelling and erythema over AC joint  Active/passive shoulder ROM limited 2/2 pain  Compartments soft and compressible  No calf tenderness bilaterally  +Axillary/Musculocutaneous/Radial/Median/AIN/PIN intact  SILT C5-T1  2+ radial pulses      A/P:  87y m s/p R GH and AC Joint I&D POD 5    -sp PICC line, 5/19, plan for discharge  -Drain DCed  -PT/OT -WBAT RUE  -Pain Control  -DVT ppx per medical team  -Antibiotics per ID  -FU AM Labs  -Rest, ice, compress and elevate the extremity as we needed  -Incentive Spirometry  -Medical management appreciated  -Ortho stable for discharge  -OR Cultures growing staph aureus, follow for sensitivities   -Aspiration and blood cultures growing staph MSSA

## 2022-05-26 DIAGNOSIS — D72.829 ELEVATED WHITE BLOOD CELL COUNT, UNSPECIFIED: ICD-10-CM

## 2022-05-26 DIAGNOSIS — L03.113 CELLULITIS OF RIGHT UPPER LIMB: ICD-10-CM

## 2022-05-26 DIAGNOSIS — C79.51 SECONDARY MALIGNANT NEOPLASM OF BONE: ICD-10-CM

## 2022-05-26 DIAGNOSIS — L03.115 CELLULITIS OF RIGHT LOWER LIMB: ICD-10-CM

## 2022-05-26 DIAGNOSIS — I48.0 PAROXYSMAL ATRIAL FIBRILLATION: ICD-10-CM

## 2022-05-26 DIAGNOSIS — M00.011 STAPHYLOCOCCAL ARTHRITIS, RIGHT SHOULDER: ICD-10-CM

## 2022-05-26 DIAGNOSIS — E87.1 HYPO-OSMOLALITY AND HYPONATREMIA: ICD-10-CM

## 2022-05-26 DIAGNOSIS — M00.811 ARTHRITIS DUE TO OTHER BACTERIA, RIGHT SHOULDER: ICD-10-CM

## 2022-05-26 DIAGNOSIS — E78.5 HYPERLIPIDEMIA, UNSPECIFIED: ICD-10-CM

## 2022-05-26 DIAGNOSIS — E43 UNSPECIFIED SEVERE PROTEIN-CALORIE MALNUTRITION: ICD-10-CM

## 2022-05-26 DIAGNOSIS — D64.9 ANEMIA, UNSPECIFIED: ICD-10-CM

## 2022-05-26 DIAGNOSIS — C61 MALIGNANT NEOPLASM OF PROSTATE: ICD-10-CM

## 2022-05-26 DIAGNOSIS — I87.2 VENOUS INSUFFICIENCY (CHRONIC) (PERIPHERAL): ICD-10-CM

## 2022-05-26 DIAGNOSIS — M19.011 PRIMARY OSTEOARTHRITIS, RIGHT SHOULDER: ICD-10-CM

## 2022-05-26 DIAGNOSIS — B95.61 METHICILLIN SUSCEPTIBLE STAPHYLOCOCCUS AUREUS INFECTION AS THE CAUSE OF DISEASES CLASSIFIED ELSEWHERE: ICD-10-CM

## 2022-05-26 DIAGNOSIS — L03.116 CELLULITIS OF LEFT LOWER LIMB: ICD-10-CM

## 2022-05-26 DIAGNOSIS — I10 ESSENTIAL (PRIMARY) HYPERTENSION: ICD-10-CM

## 2022-05-26 DIAGNOSIS — G93.41 METABOLIC ENCEPHALOPATHY: ICD-10-CM

## 2022-05-26 DIAGNOSIS — M75.101 UNSPECIFIED ROTATOR CUFF TEAR OR RUPTURE OF RIGHT SHOULDER, NOT SPECIFIED AS TRAUMATIC: ICD-10-CM

## 2022-05-27 LAB
CULTURE RESULTS: SIGNIFICANT CHANGE UP
ORGANISM # SPEC MICROSCOPIC CNT: SIGNIFICANT CHANGE UP
ORGANISM # SPEC MICROSCOPIC CNT: SIGNIFICANT CHANGE UP
SPECIMEN SOURCE: SIGNIFICANT CHANGE UP

## 2022-06-02 ENCOUNTER — EMERGENCY (EMERGENCY)
Facility: HOSPITAL | Age: 87
LOS: 0 days | Discharge: HOME | End: 2022-06-02
Attending: EMERGENCY MEDICINE
Payer: MEDICARE

## 2022-06-02 VITALS
WEIGHT: 179.9 LBS | RESPIRATION RATE: 18 BRPM | OXYGEN SATURATION: 100 % | HEART RATE: 87 BPM | TEMPERATURE: 98 F | DIASTOLIC BLOOD PRESSURE: 76 MMHG | SYSTOLIC BLOOD PRESSURE: 115 MMHG | HEIGHT: 70 IN

## 2022-06-02 VITALS
OXYGEN SATURATION: 98 % | RESPIRATION RATE: 20 BRPM | SYSTOLIC BLOOD PRESSURE: 148 MMHG | HEART RATE: 89 BPM | DIASTOLIC BLOOD PRESSURE: 73 MMHG

## 2022-06-02 DIAGNOSIS — R22.43 LOCALIZED SWELLING, MASS AND LUMP, LOWER LIMB, BILATERAL: ICD-10-CM

## 2022-06-02 DIAGNOSIS — Z85.46 PERSONAL HISTORY OF MALIGNANT NEOPLASM OF PROSTATE: Chronic | ICD-10-CM

## 2022-06-02 DIAGNOSIS — Z90.49 ACQUIRED ABSENCE OF OTHER SPECIFIED PARTS OF DIGESTIVE TRACT: Chronic | ICD-10-CM

## 2022-06-02 DIAGNOSIS — I10 ESSENTIAL (PRIMARY) HYPERTENSION: ICD-10-CM

## 2022-06-02 DIAGNOSIS — I48.91 UNSPECIFIED ATRIAL FIBRILLATION: ICD-10-CM

## 2022-06-02 DIAGNOSIS — E78.5 HYPERLIPIDEMIA, UNSPECIFIED: ICD-10-CM

## 2022-06-02 DIAGNOSIS — Z79.01 LONG TERM (CURRENT) USE OF ANTICOAGULANTS: ICD-10-CM

## 2022-06-02 LAB
ALBUMIN SERPL ELPH-MCNC: 2.7 G/DL — LOW (ref 3.3–5)
ALP SERPL-CCNC: 407 U/L — HIGH (ref 40–120)
ALT FLD-CCNC: <6 U/L — LOW (ref 12–78)
ANION GAP SERPL CALC-SCNC: 7 MMOL/L — SIGNIFICANT CHANGE UP (ref 5–17)
AST SERPL-CCNC: 39 U/L — HIGH (ref 15–37)
BASOPHILS # BLD AUTO: 0.04 K/UL — SIGNIFICANT CHANGE UP (ref 0–0.2)
BASOPHILS NFR BLD AUTO: 0.5 % — SIGNIFICANT CHANGE UP (ref 0–2)
BILIRUB SERPL-MCNC: 0.5 MG/DL — SIGNIFICANT CHANGE UP (ref 0.2–1.2)
BUN SERPL-MCNC: 22 MG/DL — SIGNIFICANT CHANGE UP (ref 7–23)
CALCIUM SERPL-MCNC: 8.9 MG/DL — SIGNIFICANT CHANGE UP (ref 8.5–10.1)
CHLORIDE SERPL-SCNC: 104 MMOL/L — SIGNIFICANT CHANGE UP (ref 96–108)
CO2 SERPL-SCNC: 29 MMOL/L — SIGNIFICANT CHANGE UP (ref 22–31)
CREAT SERPL-MCNC: 0.83 MG/DL — SIGNIFICANT CHANGE UP (ref 0.5–1.3)
EGFR: 85 ML/MIN/1.73M2 — SIGNIFICANT CHANGE UP
EOSINOPHIL # BLD AUTO: 0.15 K/UL — SIGNIFICANT CHANGE UP (ref 0–0.5)
EOSINOPHIL NFR BLD AUTO: 2 % — SIGNIFICANT CHANGE UP (ref 0–6)
GLUCOSE SERPL-MCNC: 95 MG/DL — SIGNIFICANT CHANGE UP (ref 70–99)
HCT VFR BLD CALC: 35.6 % — LOW (ref 39–50)
HGB BLD-MCNC: 11 G/DL — LOW (ref 13–17)
IMM GRANULOCYTES NFR BLD AUTO: 0.8 % — SIGNIFICANT CHANGE UP (ref 0–1.5)
LYMPHOCYTES # BLD AUTO: 1.52 K/UL — SIGNIFICANT CHANGE UP (ref 1–3.3)
LYMPHOCYTES # BLD AUTO: 20.5 % — SIGNIFICANT CHANGE UP (ref 13–44)
MCHC RBC-ENTMCNC: 28.2 PG — SIGNIFICANT CHANGE UP (ref 27–34)
MCHC RBC-ENTMCNC: 30.9 GM/DL — LOW (ref 32–36)
MCV RBC AUTO: 91.3 FL — SIGNIFICANT CHANGE UP (ref 80–100)
MONOCYTES # BLD AUTO: 0.89 K/UL — SIGNIFICANT CHANGE UP (ref 0–0.9)
MONOCYTES NFR BLD AUTO: 12 % — SIGNIFICANT CHANGE UP (ref 2–14)
NEUTROPHILS # BLD AUTO: 4.77 K/UL — SIGNIFICANT CHANGE UP (ref 1.8–7.4)
NEUTROPHILS NFR BLD AUTO: 64.2 % — SIGNIFICANT CHANGE UP (ref 43–77)
NT-PROBNP SERPL-SCNC: 1227 PG/ML — HIGH (ref 0–450)
PLATELET # BLD AUTO: 210 K/UL — SIGNIFICANT CHANGE UP (ref 150–400)
POTASSIUM SERPL-MCNC: 4.3 MMOL/L — SIGNIFICANT CHANGE UP (ref 3.5–5.3)
POTASSIUM SERPL-SCNC: 4.3 MMOL/L — SIGNIFICANT CHANGE UP (ref 3.5–5.3)
PROT SERPL-MCNC: 7.4 GM/DL — SIGNIFICANT CHANGE UP (ref 6–8.3)
RBC # BLD: 3.9 M/UL — LOW (ref 4.2–5.8)
RBC # FLD: 15.1 % — HIGH (ref 10.3–14.5)
SODIUM SERPL-SCNC: 140 MMOL/L — SIGNIFICANT CHANGE UP (ref 135–145)
TROPONIN I, HIGH SENSITIVITY RESULT: 52.46 NG/L — SIGNIFICANT CHANGE UP
WBC # BLD: 7.43 K/UL — SIGNIFICANT CHANGE UP (ref 3.8–10.5)
WBC # FLD AUTO: 7.43 K/UL — SIGNIFICANT CHANGE UP (ref 3.8–10.5)

## 2022-06-02 PROCEDURE — 93970 EXTREMITY STUDY: CPT

## 2022-06-02 PROCEDURE — 71045 X-RAY EXAM CHEST 1 VIEW: CPT | Mod: 26

## 2022-06-02 PROCEDURE — 85025 COMPLETE CBC W/AUTO DIFF WBC: CPT

## 2022-06-02 PROCEDURE — 84484 ASSAY OF TROPONIN QUANT: CPT

## 2022-06-02 PROCEDURE — 93010 ELECTROCARDIOGRAM REPORT: CPT

## 2022-06-02 PROCEDURE — 83880 ASSAY OF NATRIURETIC PEPTIDE: CPT

## 2022-06-02 PROCEDURE — 80053 COMPREHEN METABOLIC PANEL: CPT

## 2022-06-02 PROCEDURE — 99285 EMERGENCY DEPT VISIT HI MDM: CPT | Mod: 25

## 2022-06-02 PROCEDURE — 93005 ELECTROCARDIOGRAM TRACING: CPT

## 2022-06-02 PROCEDURE — 71045 X-RAY EXAM CHEST 1 VIEW: CPT

## 2022-06-02 PROCEDURE — 36415 COLL VENOUS BLD VENIPUNCTURE: CPT

## 2022-06-02 PROCEDURE — 93970 EXTREMITY STUDY: CPT | Mod: 26

## 2022-06-02 PROCEDURE — 99284 EMERGENCY DEPT VISIT MOD MDM: CPT

## 2022-06-02 RX ORDER — ACETAMINOPHEN 500 MG
2 TABLET ORAL
Qty: 0 | Refills: 0 | DISCHARGE

## 2022-06-02 RX ORDER — ATORVASTATIN CALCIUM 80 MG/1
1 TABLET, FILM COATED ORAL
Qty: 0 | Refills: 0 | DISCHARGE

## 2022-06-02 RX ORDER — TIMOLOL 0.5 %
1 DROPS OPHTHALMIC (EYE)
Qty: 0 | Refills: 0 | DISCHARGE

## 2022-06-02 NOTE — ED PROVIDER NOTE - PHYSICAL EXAMINATION
GEN - NAD; well appearing; A+O x3   HEAD - NC/AT     EYES - EOMI, no conjunctival pallor, no scleral icterus  ENT -   mucous membranes  moist , no discharge      NECK - Neck supple  PULM - CTA b/l,  symmetric breath sounds  COR -  RRR, S1 S2, no murmurs  ABD - , ND, NT, soft, no guarding, no rebound, no masses    BACK - no CVA tenderness, nontender spine     EXTREMS - no edema, no deformity, warm and well perfused    SKIN - no rash or bruising      NEUROLOGIC - alert, sensation nl, motor 5/5 RUE/LUE/RLE/LLE GEN - NAD; well appearing; A+O x2   HEAD - NC/AT     EYES - EOMI, no conjunctival pallor, no scleral icterus  ENT -   mucous membranes  moist , no discharge      NECK - Neck supple  PULM - CTA b/l,  symmetric breath sounds  COR -  RRR, S1 S2, no murmurs  ABD - , ND, NT, soft, no guarding, no rebound, no masses    BACK - no CVA tenderness, nontender spine     EXTREMS - chronic PVD with pressure wounds.   NEUROLOGIC - alert, sensation nl, motor 5/5 RUE/LUE/RLE/LLE

## 2022-06-02 NOTE — ED PROVIDER NOTE - TOBACCO USE
EXAM DESCRIPTION:  CTAbdomen   Pelvis W Contrast - 12/20/2018 6:33 am

 

CLINICAL HISTORY:  Abdominal pain.

iv only;Abd pain

 

COMPARISON:  Abdomen   Pelvis W Contrast dated 11/26/2018

 

TECHNIQUE:  Biphasic CT imaging of the abdomen and pelvis was performed with 100 ml non-ionic IV cont
rast.

 

All CT scans are performed using dose optimization technique as appropriate and may include automated
 exposure control or mA/KV adjustment according to patient size.

 

FINDINGS:  Small bilateral pleural effusions are present with linear atelectasis in both lung bases.M
ild interstitial pulmonary edema suspected.

 

The large stone is seen within the gallbladder. Mild periportal edema is seen in the liver. The splee
n, adrenal glands and kidneys show no acute process. No pancreatic lesion is observed. Aortoiliac ath
erosclerosis.

 

No bowel obstruction, free air, free fluid or abscess. Prominent sigmoid diverticulosis is seen. The 
appendix is not identified as a discrete structure, however, no secondary findings of appendicitis ar
e identified.   No evidence of significant lymphadenopathy.

 

No suspicious bony findings.

 

IMPRESSION:  Cholelithiasis.

 

Mild CHF likely present. Unknown if ever smoked

## 2022-06-02 NOTE — ED PROVIDER NOTE - NS ED ROS FT
Gen: No fever, normal appetite  Eyes: No eye irritation or discharge  ENT: No ear pain, congestion, sore throat  Resp: No cough or trouble breathing  Cardiovascular: No chest pain or palpitation  Gastroenteric: No nausea/vomiting, diarrhea, constipation  :  No change in urine output; no dysuria  MS: No joint or muscle pain. Swelling to lower extremities   Skin: No rashes  Neuro: No headache; no abnormal movements  Remainder negative, except as per the HPI

## 2022-06-02 NOTE — ED PROVIDER NOTE - NSICDXPASTMEDICALHX_GEN_ALL_CORE_FT
PAST MEDICAL HISTORY:  Afib     HLD (hyperlipidemia)     HTN (hypertension)     Prostate cancer

## 2022-06-02 NOTE — ED PROVIDER NOTE - PATIENT PORTAL LINK FT
You can access the FollowMyHealth Patient Portal offered by [facility] by registering at the following website: [email or web address]. By joining [facility]’s FollowMyHealth portal, you will also be able to view your health information using other applications (apps) compatible with our system.

## 2022-06-02 NOTE — ED PROVIDER NOTE - OBJECTIVE STATEMENT
86 y/o male with a PMHx of Afib, HTN, HLD, presents to the ED for swelling of legs. Nursing facility wanted to have an ultra sound to r/o blood clots but pt refused. Recent admission in [facility] for shoulder infection and is on IV antibiotics.

## 2022-06-02 NOTE — ED ADULT NURSE NOTE - NSICDXPASTMEDICALHX_GEN_ALL_CORE_FT
PAST MEDICAL HISTORY:  Afib     HLD (hyperlipidemia)     HTN (hypertension)     Prostate cancer

## 2022-06-02 NOTE — ED ADULT NURSE NOTE - OBJECTIVE STATEMENT
Pt A&Ox3, BIBEMA from [facility] for b/l LE edema and difficulties ambulating. Facility wanted to obtain doppler, but pt refused. Pt hx chronic cellulitis being followed by Dr. rCaig. Patient denies any Hx of CHF. On Eliquis for afib. Patient states he had surgery on right shoulder two weeks ago when symptoms began. Pt denies SOB, chest pain, pain or discomfort.

## 2022-06-02 NOTE — ED ADULT NURSE NOTE - CHIEF COMPLAINT QUOTE
Pt comes to ED from [facility] for b/l LE edema. Facility wanted to obtain doppler but pt refused. Pt hx chronic cellulitis being followed by Dr. Craig. Pt denies SOB, chest pain, pain or discomfort.

## 2022-06-02 NOTE — ED ADULT NURSE NOTE - NSIMPLEMENTINTERV_GEN_ALL_ED
Implemented All Fall with Harm Risk Interventions:  [place] to call system. Call bell, personal items and telephone within reach. Instruct patient to call for assistance. Room bathroom lighting operational. Non-slip footwear when patient is off stretcher. Physically safe environment: no spills, clutter or unnecessary equipment. Stretcher in lowest position, wheels locked, appropriate side rails in place. Provide visual cue, wrist band, yellow gown, etc. Monitor gait and stability. Monitor for mental status changes and reorient to person, place, and time. Review medications for side effects contributing to fall risk. Reinforce activity limits and safety measures with patient and family. Provide visual clues: red socks.

## 2022-06-02 NOTE — ED ADULT TRIAGE NOTE - CHIEF COMPLAINT QUOTE
Pt comes to ED from [facility] for b/l LE edema. Facility wanted to obtain doppler but pt refused. Pt hx chronic cellulitis being followed by Dr. Craig. Pt denies SOB, chest pain, pain or discomfort.

## 2022-06-03 PROBLEM — C61 MALIGNANT NEOPLASM OF PROSTATE: Chronic | Status: ACTIVE | Noted: 2022-05-20

## 2022-06-30 ENCOUNTER — INPATIENT (INPATIENT)
Facility: HOSPITAL | Age: 87
LOS: 5 days | Discharge: NURSING, REHAB OR LONG-TERM CARE | DRG: 689 | End: 2022-07-06
Attending: INTERNAL MEDICINE | Admitting: HOSPITALIST
Payer: OTHER GOVERNMENT

## 2022-06-30 VITALS
DIASTOLIC BLOOD PRESSURE: 69 MMHG | HEART RATE: 70 BPM | OXYGEN SATURATION: 100 % | TEMPERATURE: 98 F | RESPIRATION RATE: 20 BRPM | SYSTOLIC BLOOD PRESSURE: 105 MMHG | HEIGHT: 70 IN

## 2022-06-30 DIAGNOSIS — I50.32 CHRONIC DIASTOLIC (CONGESTIVE) HEART FAILURE: ICD-10-CM

## 2022-06-30 DIAGNOSIS — Z90.49 ACQUIRED ABSENCE OF OTHER SPECIFIED PARTS OF DIGESTIVE TRACT: Chronic | ICD-10-CM

## 2022-06-30 DIAGNOSIS — I48.91 UNSPECIFIED ATRIAL FIBRILLATION: ICD-10-CM

## 2022-06-30 DIAGNOSIS — C61 MALIGNANT NEOPLASM OF PROSTATE: ICD-10-CM

## 2022-06-30 DIAGNOSIS — E78.5 HYPERLIPIDEMIA, UNSPECIFIED: ICD-10-CM

## 2022-06-30 DIAGNOSIS — I10 ESSENTIAL (PRIMARY) HYPERTENSION: ICD-10-CM

## 2022-06-30 DIAGNOSIS — G93.49 OTHER ENCEPHALOPATHY: ICD-10-CM

## 2022-06-30 DIAGNOSIS — Z85.46 PERSONAL HISTORY OF MALIGNANT NEOPLASM OF PROSTATE: Chronic | ICD-10-CM

## 2022-06-30 DIAGNOSIS — G93.41 METABOLIC ENCEPHALOPATHY: ICD-10-CM

## 2022-06-30 LAB
ALBUMIN SERPL ELPH-MCNC: 3 G/DL — LOW (ref 3.3–5)
ALP SERPL-CCNC: 430 U/L — HIGH (ref 40–120)
ALT FLD-CCNC: 8 U/L — LOW (ref 12–78)
ANION GAP SERPL CALC-SCNC: 6 MMOL/L — SIGNIFICANT CHANGE UP (ref 5–17)
APPEARANCE UR: ABNORMAL
APTT BLD: 35.5 SEC — SIGNIFICANT CHANGE UP (ref 27.5–35.5)
AST SERPL-CCNC: 63 U/L — HIGH (ref 15–37)
BASOPHILS # BLD AUTO: 0.05 K/UL — SIGNIFICANT CHANGE UP (ref 0–0.2)
BASOPHILS NFR BLD AUTO: 0.7 % — SIGNIFICANT CHANGE UP (ref 0–2)
BILIRUB SERPL-MCNC: 1 MG/DL — SIGNIFICANT CHANGE UP (ref 0.2–1.2)
BILIRUB UR-MCNC: NEGATIVE — SIGNIFICANT CHANGE UP
BUN SERPL-MCNC: 28 MG/DL — HIGH (ref 7–23)
CALCIUM SERPL-MCNC: 9.4 MG/DL — SIGNIFICANT CHANGE UP (ref 8.5–10.1)
CHLORIDE SERPL-SCNC: 97 MMOL/L — SIGNIFICANT CHANGE UP (ref 96–108)
CO2 SERPL-SCNC: 34 MMOL/L — HIGH (ref 22–31)
COLOR SPEC: YELLOW — SIGNIFICANT CHANGE UP
CREAT SERPL-MCNC: 1.08 MG/DL — SIGNIFICANT CHANGE UP (ref 0.5–1.3)
DIFF PNL FLD: ABNORMAL
EGFR: 66 ML/MIN/1.73M2 — SIGNIFICANT CHANGE UP
EOSINOPHIL # BLD AUTO: 0.16 K/UL — SIGNIFICANT CHANGE UP (ref 0–0.5)
EOSINOPHIL NFR BLD AUTO: 2.3 % — SIGNIFICANT CHANGE UP (ref 0–6)
GLUCOSE SERPL-MCNC: 100 MG/DL — HIGH (ref 70–99)
GLUCOSE UR QL: NEGATIVE — SIGNIFICANT CHANGE UP
HCT VFR BLD CALC: 37.4 % — LOW (ref 39–50)
HGB BLD-MCNC: 11.5 G/DL — LOW (ref 13–17)
IMM GRANULOCYTES NFR BLD AUTO: 0.7 % — SIGNIFICANT CHANGE UP (ref 0–1.5)
INR BLD: 2.82 RATIO — HIGH (ref 0.88–1.16)
KETONES UR-MCNC: NEGATIVE — SIGNIFICANT CHANGE UP
LACTATE SERPL-SCNC: 2.4 MMOL/L — HIGH (ref 0.7–2)
LACTATE SERPL-SCNC: 2.7 MMOL/L — HIGH (ref 0.7–2)
LEUKOCYTE ESTERASE UR-ACNC: ABNORMAL
LYMPHOCYTES # BLD AUTO: 1.48 K/UL — SIGNIFICANT CHANGE UP (ref 1–3.3)
LYMPHOCYTES # BLD AUTO: 20.9 % — SIGNIFICANT CHANGE UP (ref 13–44)
MCHC RBC-ENTMCNC: 28.3 PG — SIGNIFICANT CHANGE UP (ref 27–34)
MCHC RBC-ENTMCNC: 30.7 GM/DL — LOW (ref 32–36)
MCV RBC AUTO: 92.1 FL — SIGNIFICANT CHANGE UP (ref 80–100)
MONOCYTES # BLD AUTO: 0.96 K/UL — HIGH (ref 0–0.9)
MONOCYTES NFR BLD AUTO: 13.6 % — SIGNIFICANT CHANGE UP (ref 2–14)
NEUTROPHILS # BLD AUTO: 4.38 K/UL — SIGNIFICANT CHANGE UP (ref 1.8–7.4)
NEUTROPHILS NFR BLD AUTO: 61.8 % — SIGNIFICANT CHANGE UP (ref 43–77)
NITRITE UR-MCNC: NEGATIVE — SIGNIFICANT CHANGE UP
PH UR: 6.5 — SIGNIFICANT CHANGE UP (ref 5–8)
PLATELET # BLD AUTO: 187 K/UL — SIGNIFICANT CHANGE UP (ref 150–400)
POTASSIUM SERPL-MCNC: 4.2 MMOL/L — SIGNIFICANT CHANGE UP (ref 3.5–5.3)
POTASSIUM SERPL-SCNC: 4.2 MMOL/L — SIGNIFICANT CHANGE UP (ref 3.5–5.3)
PROT SERPL-MCNC: 7.3 GM/DL — SIGNIFICANT CHANGE UP (ref 6–8.3)
PROT UR-MCNC: 15
PROTHROM AB SERPL-ACNC: 33 SEC — HIGH (ref 10.5–13.4)
RBC # BLD: 4.06 M/UL — LOW (ref 4.2–5.8)
RBC # FLD: 14.5 % — SIGNIFICANT CHANGE UP (ref 10.3–14.5)
SARS-COV-2 RNA SPEC QL NAA+PROBE: SIGNIFICANT CHANGE UP
SODIUM SERPL-SCNC: 137 MMOL/L — SIGNIFICANT CHANGE UP (ref 135–145)
SP GR SPEC: 1.01 — SIGNIFICANT CHANGE UP (ref 1.01–1.02)
UROBILINOGEN FLD QL: 4
WBC # BLD: 7.08 K/UL — SIGNIFICANT CHANGE UP (ref 3.8–10.5)
WBC # FLD AUTO: 7.08 K/UL — SIGNIFICANT CHANGE UP (ref 3.8–10.5)

## 2022-06-30 PROCEDURE — 86140 C-REACTIVE PROTEIN: CPT

## 2022-06-30 PROCEDURE — G1004: CPT

## 2022-06-30 PROCEDURE — 93010 ELECTROCARDIOGRAM REPORT: CPT

## 2022-06-30 PROCEDURE — 81001 URINALYSIS AUTO W/SCOPE: CPT

## 2022-06-30 PROCEDURE — 73030 X-RAY EXAM OF SHOULDER: CPT | Mod: RT

## 2022-06-30 PROCEDURE — U0005: CPT

## 2022-06-30 PROCEDURE — 85652 RBC SED RATE AUTOMATED: CPT

## 2022-06-30 PROCEDURE — 80048 BASIC METABOLIC PNL TOTAL CA: CPT

## 2022-06-30 PROCEDURE — 74176 CT ABD & PELVIS W/O CONTRAST: CPT | Mod: 26

## 2022-06-30 PROCEDURE — 85025 COMPLETE CBC W/AUTO DIFF WBC: CPT

## 2022-06-30 PROCEDURE — 36415 COLL VENOUS BLD VENIPUNCTURE: CPT

## 2022-06-30 PROCEDURE — 92610 EVALUATE SWALLOWING FUNCTION: CPT | Mod: GN

## 2022-06-30 PROCEDURE — 83605 ASSAY OF LACTIC ACID: CPT

## 2022-06-30 PROCEDURE — 70450 CT HEAD/BRAIN W/O DYE: CPT | Mod: 26,ME

## 2022-06-30 PROCEDURE — 87186 SC STD MICRODIL/AGAR DIL: CPT

## 2022-06-30 PROCEDURE — 97116 GAIT TRAINING THERAPY: CPT | Mod: GP

## 2022-06-30 PROCEDURE — 92523 SPEECH SOUND LANG COMPREHEN: CPT | Mod: GN

## 2022-06-30 PROCEDURE — 99285 EMERGENCY DEPT VISIT HI MDM: CPT

## 2022-06-30 PROCEDURE — 99223 1ST HOSP IP/OBS HIGH 75: CPT

## 2022-06-30 PROCEDURE — 71045 X-RAY EXAM CHEST 1 VIEW: CPT | Mod: 26

## 2022-06-30 PROCEDURE — 83735 ASSAY OF MAGNESIUM: CPT

## 2022-06-30 PROCEDURE — 80053 COMPREHEN METABOLIC PANEL: CPT

## 2022-06-30 PROCEDURE — U0003: CPT

## 2022-06-30 PROCEDURE — 87086 URINE CULTURE/COLONY COUNT: CPT

## 2022-06-30 PROCEDURE — 85027 COMPLETE CBC AUTOMATED: CPT

## 2022-06-30 PROCEDURE — 97163 PT EVAL HIGH COMPLEX 45 MIN: CPT | Mod: GP

## 2022-06-30 PROCEDURE — 97530 THERAPEUTIC ACTIVITIES: CPT | Mod: GP

## 2022-06-30 PROCEDURE — 74176 CT ABD & PELVIS W/O CONTRAST: CPT

## 2022-06-30 RX ORDER — ATORVASTATIN CALCIUM 80 MG/1
10 TABLET, FILM COATED ORAL AT BEDTIME
Refills: 0 | Status: DISCONTINUED | OUTPATIENT
Start: 2022-06-30 | End: 2022-07-06

## 2022-06-30 RX ORDER — TIMOLOL 0.5 %
1 DROPS OPHTHALMIC (EYE)
Qty: 0 | Refills: 0 | DISCHARGE

## 2022-06-30 RX ORDER — ALBUTEROL 90 UG/1
1 AEROSOL, METERED ORAL
Qty: 0 | Refills: 0 | DISCHARGE

## 2022-06-30 RX ORDER — LANOLIN ALCOHOL/MO/W.PET/CERES
3 CREAM (GRAM) TOPICAL AT BEDTIME
Refills: 0 | Status: DISCONTINUED | OUTPATIENT
Start: 2022-06-30 | End: 2022-07-06

## 2022-06-30 RX ORDER — FOLIC ACID 0.8 MG
1 TABLET ORAL DAILY
Refills: 0 | Status: DISCONTINUED | OUTPATIENT
Start: 2022-06-30 | End: 2022-07-06

## 2022-06-30 RX ORDER — APIXABAN 2.5 MG/1
5 TABLET, FILM COATED ORAL EVERY 12 HOURS
Refills: 0 | Status: DISCONTINUED | OUTPATIENT
Start: 2022-06-30 | End: 2022-07-06

## 2022-06-30 RX ORDER — DOFETILIDE 0.25 MG/1
250 CAPSULE ORAL
Refills: 0 | Status: DISCONTINUED | OUTPATIENT
Start: 2022-06-30 | End: 2022-07-06

## 2022-06-30 RX ORDER — ATORVASTATIN CALCIUM 80 MG/1
1 TABLET, FILM COATED ORAL
Qty: 0 | Refills: 0 | DISCHARGE

## 2022-06-30 RX ORDER — SODIUM CHLORIDE 9 MG/ML
1200 INJECTION, SOLUTION INTRAVENOUS ONCE
Refills: 0 | Status: COMPLETED | OUTPATIENT
Start: 2022-06-30 | End: 2022-06-30

## 2022-06-30 RX ORDER — POTASSIUM CHLORIDE 20 MEQ
2 PACKET (EA) ORAL
Qty: 0 | Refills: 0 | DISCHARGE

## 2022-06-30 RX ORDER — CEFTRIAXONE 500 MG/1
1000 INJECTION, POWDER, FOR SOLUTION INTRAMUSCULAR; INTRAVENOUS ONCE
Refills: 0 | Status: DISCONTINUED | OUTPATIENT
Start: 2022-06-30 | End: 2022-06-30

## 2022-06-30 RX ORDER — DOFETILIDE 0.25 MG/1
1 CAPSULE ORAL
Qty: 0 | Refills: 0 | DISCHARGE

## 2022-06-30 RX ORDER — FUROSEMIDE 40 MG
1 TABLET ORAL
Qty: 0 | Refills: 0 | DISCHARGE

## 2022-06-30 RX ORDER — ACETAMINOPHEN 500 MG
2 TABLET ORAL
Qty: 0 | Refills: 0 | DISCHARGE

## 2022-06-30 RX ORDER — FOLIC ACID 0.8 MG
1 TABLET ORAL
Qty: 0 | Refills: 0 | DISCHARGE

## 2022-06-30 RX ORDER — APIXABAN 2.5 MG/1
1 TABLET, FILM COATED ORAL
Qty: 0 | Refills: 0 | DISCHARGE

## 2022-06-30 RX ORDER — LANOLIN ALCOHOL/MO/W.PET/CERES
1 CREAM (GRAM) TOPICAL
Qty: 0 | Refills: 0 | DISCHARGE

## 2022-06-30 RX ORDER — SODIUM CHLORIDE 9 MG/ML
1000 INJECTION, SOLUTION INTRAVENOUS ONCE
Refills: 0 | Status: COMPLETED | OUTPATIENT
Start: 2022-06-30 | End: 2022-06-30

## 2022-06-30 RX ORDER — ONDANSETRON 8 MG/1
4 TABLET, FILM COATED ORAL EVERY 8 HOURS
Refills: 0 | Status: DISCONTINUED | OUTPATIENT
Start: 2022-06-30 | End: 2022-07-01

## 2022-06-30 RX ORDER — ESCITALOPRAM OXALATE 10 MG/1
5 TABLET, FILM COATED ORAL DAILY
Refills: 0 | Status: DISCONTINUED | OUTPATIENT
Start: 2022-06-30 | End: 2022-07-01

## 2022-06-30 RX ORDER — CEFEPIME 1 G/1
1000 INJECTION, POWDER, FOR SOLUTION INTRAMUSCULAR; INTRAVENOUS EVERY 12 HOURS
Refills: 0 | Status: DISCONTINUED | OUTPATIENT
Start: 2022-06-30 | End: 2022-06-30

## 2022-06-30 RX ORDER — CEFEPIME 1 G/1
1000 INJECTION, POWDER, FOR SOLUTION INTRAMUSCULAR; INTRAVENOUS EVERY 12 HOURS
Refills: 0 | Status: DISCONTINUED | OUTPATIENT
Start: 2022-06-30 | End: 2022-07-01

## 2022-06-30 RX ORDER — LIDOCAINE 4 G/100G
1 CREAM TOPICAL
Qty: 0 | Refills: 0 | DISCHARGE

## 2022-06-30 RX ORDER — ALBUTEROL 90 UG/1
1 AEROSOL, METERED ORAL EVERY 6 HOURS
Refills: 0 | Status: DISCONTINUED | OUTPATIENT
Start: 2022-06-30 | End: 2022-07-06

## 2022-06-30 RX ORDER — TIMOLOL 0.5 %
1 DROPS OPHTHALMIC (EYE) DAILY
Refills: 0 | Status: DISCONTINUED | OUTPATIENT
Start: 2022-06-30 | End: 2022-07-06

## 2022-06-30 RX ORDER — ACETAMINOPHEN 500 MG
1 TABLET ORAL
Qty: 0 | Refills: 0 | DISCHARGE

## 2022-06-30 RX ORDER — SOD,AMMONIUM,POTASSIUM LACTATE
1 CREAM (GRAM) TOPICAL
Qty: 0 | Refills: 0 | DISCHARGE

## 2022-06-30 RX ORDER — SODIUM CHLORIDE 9 MG/ML
0 INJECTION INTRAMUSCULAR; INTRAVENOUS; SUBCUTANEOUS
Qty: 0 | Refills: 0 | DISCHARGE

## 2022-06-30 RX ORDER — ACETAMINOPHEN 500 MG
650 TABLET ORAL EVERY 6 HOURS
Refills: 0 | Status: DISCONTINUED | OUTPATIENT
Start: 2022-06-30 | End: 2022-07-06

## 2022-06-30 RX ORDER — FUROSEMIDE 40 MG
20 TABLET ORAL
Refills: 0 | Status: DISCONTINUED | OUTPATIENT
Start: 2022-06-30 | End: 2022-07-06

## 2022-06-30 RX ORDER — CEFTRIAXONE 500 MG/1
2000 INJECTION, POWDER, FOR SOLUTION INTRAMUSCULAR; INTRAVENOUS ONCE
Refills: 0 | Status: COMPLETED | OUTPATIENT
Start: 2022-06-30 | End: 2022-06-30

## 2022-06-30 RX ORDER — ESCITALOPRAM OXALATE 10 MG/1
1 TABLET, FILM COATED ORAL
Qty: 0 | Refills: 0 | DISCHARGE

## 2022-06-30 RX ORDER — SODIUM CHLORIDE 9 MG/ML
1000 INJECTION, SOLUTION INTRAVENOUS
Refills: 0 | Status: DISCONTINUED | OUTPATIENT
Start: 2022-06-30 | End: 2022-07-05

## 2022-06-30 RX ADMIN — ATORVASTATIN CALCIUM 10 MILLIGRAM(S): 80 TABLET, FILM COATED ORAL at 21:54

## 2022-06-30 RX ADMIN — SODIUM CHLORIDE 75 MILLILITER(S): 9 INJECTION, SOLUTION INTRAVENOUS at 16:08

## 2022-06-30 RX ADMIN — CEFTRIAXONE 100 MILLIGRAM(S): 500 INJECTION, POWDER, FOR SOLUTION INTRAMUSCULAR; INTRAVENOUS at 13:01

## 2022-06-30 RX ADMIN — SODIUM CHLORIDE 1200 MILLILITER(S): 9 INJECTION, SOLUTION INTRAVENOUS at 12:07

## 2022-06-30 RX ADMIN — SODIUM CHLORIDE 1000 MILLILITER(S): 9 INJECTION, SOLUTION INTRAVENOUS at 10:58

## 2022-06-30 RX ADMIN — CEFEPIME 1000 MILLIGRAM(S): 1 INJECTION, POWDER, FOR SOLUTION INTRAMUSCULAR; INTRAVENOUS at 21:54

## 2022-06-30 RX ADMIN — SODIUM CHLORIDE 1000 MILLILITER(S): 9 INJECTION, SOLUTION INTRAVENOUS at 18:04

## 2022-06-30 RX ADMIN — APIXABAN 5 MILLIGRAM(S): 2.5 TABLET, FILM COATED ORAL at 21:53

## 2022-06-30 NOTE — SWALLOW BEDSIDE ASSESSMENT ADULT - COMMENTS
Py admitted to  with lethargy and altered mentation. Work up in progress. This profile is superimposed upon a history of recent septic left shoulder with MSSA for which a PICC line was placed, A-Fib, CHF, HLD, HTN, PVD, asthma, metastatic Prostate cancer NOS and past appy. Pt admitted to  with lethargy and altered mentation. Work up in progress. This profile is superimposed upon a history of recent septic left shoulder with MSSA for which a PICC line was placed, A-Fib, CHF, HLD, HTN, PVD, asthma, metastatic Prostate cancer NOS and past appy.

## 2022-06-30 NOTE — ED PROVIDER NOTE - PHYSICAL EXAMINATION
gen: well appearing  Mentation: alert  ENT: airway patent  Eyes: conjunctivae clear bilaterally  Cardio: RRR, no m/r/g  Resp: normal BS b/l  GI: s/nt/nd   Neuro: AAO x 1, sensation and motor function intact  Skin: No evidence of rash, + chronic skin changes b/l extremities, with warmth,   MSK: normal movement of all extremities, +L fingers blue but cap refill <2 seconds

## 2022-06-30 NOTE — H&P ADULT - NSICDXPASTMEDICALHX_GEN_ALL_CORE_FT
PAST MEDICAL HISTORY:  Afib     HLD (hyperlipidemia)     HTN (hypertension)     Prostate cancer     PVD (peripheral vascular disease)

## 2022-06-30 NOTE — H&P ADULT - NSHPPHYSICALEXAM_GEN_ALL_CORE
PHYSICAL EXAM:    T(C): 36.9 (06-30-22 @ 16:23), Max: 36.9 (06-30-22 @ 16:23)  HR: 66 (06-30-22 @ 16:23) (64 - 72)  BP: 111/48 (06-30-22 @ 16:23) (105/69 - 116/65)  RR: 16 (06-30-22 @ 16:23) (15 - 20)  SpO2: 97% (06-30-22 @ 16:23) (97% - 100%)  Wt(kg): --    Constitutional: lethargic and chronically ill appearing     Eyes: PERRLA, EOMI     Neck: supple no JVD    Respiratory: Clear bilaterally, no wheezing, or rhonci    Cardiovascular: S1, S2 no murmurs, rubs or gallops    Gastrointestinal: soft, non-tender, + Bowel sounds    Extremities: non-tender, no edema, right PICC    Neurological: lethargic,     Skin: no rashes    Musculoskeletal: 5/5 strength throughout, normal ROM    Psychiatric: appropriate affect

## 2022-06-30 NOTE — SWALLOW BEDSIDE ASSESSMENT ADULT - ASR SWALLOW RECOMMEND DIAG
DEFER MBS AS PT APPEARED CLINICALLY TOLERANT OF SUGGESTED PO TEXTURES FROM AN OROPHARYNGEAL SWALLOWING PERSPECTIVE ON CLINICAL EXAM.

## 2022-06-30 NOTE — SWALLOW BEDSIDE ASSESSMENT ADULT - MODE OF PRESENTATION
Pt was typically fed by clinician and sometimes needed cues to sustain prolonged eye opening/joint attention.

## 2022-06-30 NOTE — ED ADULT NURSE NOTE - OBJECTIVE STATEMENT
Pt comes to ED from [facility] with AMS that started last night. Pt lethargic upon arrival to ED. Wife at bedside states pt is usually A&Ox3. Pt recently treated for infection of shoulder with IV abx. Pt with PICC noted to [facility]. Dressing c/d/i.

## 2022-06-30 NOTE — ED PROVIDER NOTE - CLINICAL SUMMARY MEDICAL DECISION MAKING FREE TEXT BOX
86 y/o male with a PMHx of Afib on Eliquis, HTN, HLD, asthma, heart failure, PVD, metastatic prostate cancer (not on treatment), LE edema presenting with AMS x1 day, unclear etiology. Will do speiss work up and CT head, dispo accordingly. Likely will require admission. PICC may need to be removed as well. 88 y/o male with a PMHx of Afib on Eliquis, HTN, HLD, asthma, heart failure, PVD, metastatic prostate cancer (not on treatment), LE edema presenting with AMS x1 day, unclear etiology. Will do speiss work up and CT head, dispo accordingly. Likely will require admission. PICC may need to be removed as well.  LP not performed as neck is supple, no rigidity, onset of ams acute in onset unlikely to be meningitic, not febrile and no white count

## 2022-06-30 NOTE — SWALLOW BEDSIDE ASSESSMENT ADULT - ASR SWALLOW DENTITION
Notable for multiple missing teeth, particularly in maxillary region. Further, some remaining maxillary teeth are in compromised condition.

## 2022-06-30 NOTE — SWALLOW BEDSIDE ASSESSMENT ADULT - SWALLOW EVAL: RECOMMENDED DIET
SUGGEST A REGULAR TEXTURE DIET WITH THIN LIQUID CONSISTENCIES AS THE PATIENT TOLERATES THESE FOOD CONSISTENCIES FROM AN OROPHARYNGEAL SWALLOWING PERSPECTIVE ON EXAM WHEN IN AN ALERT STATE. PT MUST BE ALERT WHEN BEING FED.

## 2022-06-30 NOTE — SWALLOW BEDSIDE ASSESSMENT ADULT - SWALLOW EVAL: DIAGNOSIS
1) Pt exhibits periodically reduced alertness for/orientation to feeding in setting of encephalopathy related to acute illness which is atop Oropharyngeal Swallowing abilities which subjectively appeared to be grossly functional for age when he's alert/cognizant enough to eat. NO behavioral aspiration signs exhibited. Odynophagia denied.  2) The pt is arousable but fatigued. His skin is pale. Pt was communicatively passive & internally distractible. Pt verbalized during communicative probes/in conversation at times. When verbalizing, his motor speech abilities were preserved, his utterances were linguistically intact, & he verbally made needs known on a concrete level, albeit his utterances tended to be brief/vague with encephalopathy from acute illness being contributory to verbal brevity.

## 2022-06-30 NOTE — ED PROVIDER NOTE - OBJECTIVE STATEMENT
86 y/o male with a PMHx of Afib on Eliquis, HTN, HLD, asthma, heart failure, PVD, metastatic prostate cancer (not on treatment), LE edema, s/p appendectomy presents to the ED [facility] presents to the ED for AMS starting last night. Per facility papers, the patient has been refusing to rest in the bed or in his radha chair. Patient's wife at bedside states the patient is usually alert, oriented and speaks clearly. +PICC line for Ancef x6 weeks for septic L shoulder joint with MSSA s/p I &D, last dose on Sunday. Pt is full code.

## 2022-06-30 NOTE — ED ADULT TRIAGE NOTE - CHIEF COMPLAINT QUOTE
Pt arrives to ED from [facility] for increased confusion, visual hallucinations. Hx prostate cancer with bone mets. Pt was given 5mg IVP versed by EMS for agitation.

## 2022-06-30 NOTE — H&P ADULT - ASSESSMENT
86 y/o male with altered mental status    Admit to med/surg  Septiic workup in progress  OOB with assitance  Vitals per routine

## 2022-06-30 NOTE — SWALLOW BEDSIDE ASSESSMENT ADULT - SWALLOW EVAL: CRITERIA FOR SKILLED INTERVENTION MET
DO NOT FEEL THAT ACUTE SPEECH PATHOLOGY FOLLOW UP WOULD CHANGE CLINICAL MANAGEMENT/OUTCOME WHILE IN HOSPITAL SETTING. NO ORAL MOTOR FOCALITY EVIDENT. PHARYNGEAL FUNCTION IS ACCEPTABLE FOR AGE. NO PRIMARY SPEECH-LANGUAGE PATHOLOGY WAS EVIDENT ON EXAM. TRADITIONAL SPEECH/SWALLOW THERAPY WOULD NOT BE OF CLINICAL BENEFIT. GIVEN ABOVE, THIS SERVICE WILL NOT ACTIVELY FOLLOW. RECONSULT PRN SHOULD STATUS CHANGE AND CONDITION WARRANT.

## 2022-06-30 NOTE — H&P ADULT - PROBLEM SELECTOR PLAN 1
- s/p 2 L LR bolus with persistently elevated lactate  - will give additional unit  - received Ceftriaxone in ED but will continue with Cefepime  - check blood cultures  - PICC line must be removed and cultured, please call PA for removal and send culture of tip  - ID consult - Dr. Dowd  - also obtain Ortho consult and follow up.

## 2022-06-30 NOTE — PHARMACOTHERAPY INTERVENTION NOTE - COMMENTS
Medication history complete, reviewed medications with provided med [facility] and confirmed with doctor first med hx.

## 2022-06-30 NOTE — ED ADULT NURSE NOTE - CHIEF COMPLAINT QUOTE
Pt arrives to ED from [facility] for increased confusion, visual hallucinations. Hx prostate cancer with bone mets. Pt was given 5mg IVP versed by EMS for agitation.

## 2022-06-30 NOTE — PATIENT PROFILE ADULT - FALL HARM RISK - HARM RISK INTERVENTIONS
Assistance with ambulation/Assistance OOB with selected safe patient handling equipment/Communicate Risk of Fall with Harm to all staff/Discuss with provider need for PT consult/Monitor gait and stability/Provide patient with walking aids - walker, cane, crutches/Reinforce activity limits and safety measures with patient and family/Reorient to person, place and time as needed/Review medications for side effects contributing to fall risk/Sit up slowly, dangle for a short time, stand at bedside before walking/Tailored Fall Risk Interventions/Use of alarms - bed, chair and/or voice tab/Visual Cue: Yellow wristband and red socks/Bed in lowest position, wheels locked, appropriate side rails in place/Call bell, personal items and telephone in reach/Instruct patient to call for assistance before getting out of bed or chair/Non-slip footwear when patient is out of bed/[place] to call system/Physically safe environment - no spills, clutter or unnecessary equipment/Purposeful Proactive Rounding/Room/bathroom lighting operational, light cord in reach Assistance with ambulation/Assistance OOB with selected safe patient handling equipment/Communicate Risk of Fall with Harm to all staff/Discuss with provider need for PT consult/Monitor gait and stability/Provide patient with walking aids - walker, cane, crutches/Reinforce activity limits and safety measures with patient and family/Tailored Fall Risk Interventions/Visual Cue: Yellow wristband and red socks/Bed in lowest position, wheels locked, appropriate side rails in place/Call bell, personal items and telephone in reach/Instruct patient to call for assistance before getting out of bed or chair/Non-slip footwear when patient is out of bed/[place] to call system/Physically safe environment - no spills, clutter or unnecessary equipment/Purposeful Proactive Rounding/Room/bathroom lighting operational, light cord in reach

## 2022-06-30 NOTE — SWALLOW BEDSIDE ASSESSMENT ADULT - SLP GENERAL OBSERVATIONS
The pt is arousable but fatigued. His skin is pale. Pt was communicatively passive & internally distractible. Pt verbalized during communicative probes/in conversation at times. When verbalizing, his motor speech abilities were preserved, his utterances were linguistically intact, & he verbally made needs known on a concrete level, albeit his utterances tended to be brief/vague with encephalopathy from acute illness being contributory to verbal brevity.

## 2022-06-30 NOTE — SWALLOW BEDSIDE ASSESSMENT ADULT - SWALLOW EVAL: FEEDING ASSISTANCE
PT OCCASIONALLY FED SELF BUT HE WAS TYPICALLY EXTERNALLY FED. PT NEEDED CUES TO SUSTAIN ALERTNESS/PROLONGED JOINT ATTENTION AT TIMES. PT MUST BE ALERT WHEN BEING FED.

## 2022-07-01 LAB
ALBUMIN SERPL ELPH-MCNC: 2.3 G/DL — LOW (ref 3.3–5)
ALP SERPL-CCNC: 347 U/L — HIGH (ref 40–120)
ALT FLD-CCNC: <6 U/L — LOW (ref 12–78)
ANION GAP SERPL CALC-SCNC: 7 MMOL/L — SIGNIFICANT CHANGE UP (ref 5–17)
AST SERPL-CCNC: 46 U/L — HIGH (ref 15–37)
BASOPHILS # BLD AUTO: 0.03 K/UL — SIGNIFICANT CHANGE UP (ref 0–0.2)
BASOPHILS NFR BLD AUTO: 0.4 % — SIGNIFICANT CHANGE UP (ref 0–2)
BILIRUB SERPL-MCNC: 0.7 MG/DL — SIGNIFICANT CHANGE UP (ref 0.2–1.2)
BUN SERPL-MCNC: 21 MG/DL — SIGNIFICANT CHANGE UP (ref 7–23)
CALCIUM SERPL-MCNC: 8.4 MG/DL — LOW (ref 8.5–10.1)
CHLORIDE SERPL-SCNC: 99 MMOL/L — SIGNIFICANT CHANGE UP (ref 96–108)
CO2 SERPL-SCNC: 35 MMOL/L — HIGH (ref 22–31)
CREAT SERPL-MCNC: 0.84 MG/DL — SIGNIFICANT CHANGE UP (ref 0.5–1.3)
CRP SERPL-MCNC: 35 MG/L — HIGH
EGFR: 84 ML/MIN/1.73M2 — SIGNIFICANT CHANGE UP
EOSINOPHIL # BLD AUTO: 0.14 K/UL — SIGNIFICANT CHANGE UP (ref 0–0.5)
EOSINOPHIL NFR BLD AUTO: 1.9 % — SIGNIFICANT CHANGE UP (ref 0–6)
GLUCOSE SERPL-MCNC: 85 MG/DL — SIGNIFICANT CHANGE UP (ref 70–99)
HCT VFR BLD CALC: 34.3 % — LOW (ref 39–50)
HGB BLD-MCNC: 10.5 G/DL — LOW (ref 13–17)
IMM GRANULOCYTES NFR BLD AUTO: 0.4 % — SIGNIFICANT CHANGE UP (ref 0–1.5)
LYMPHOCYTES # BLD AUTO: 1.43 K/UL — SIGNIFICANT CHANGE UP (ref 1–3.3)
LYMPHOCYTES # BLD AUTO: 19.1 % — SIGNIFICANT CHANGE UP (ref 13–44)
MAGNESIUM SERPL-MCNC: 1.9 MG/DL — SIGNIFICANT CHANGE UP (ref 1.6–2.6)
MCHC RBC-ENTMCNC: 28 PG — SIGNIFICANT CHANGE UP (ref 27–34)
MCHC RBC-ENTMCNC: 30.6 GM/DL — LOW (ref 32–36)
MCV RBC AUTO: 91.5 FL — SIGNIFICANT CHANGE UP (ref 80–100)
MONOCYTES # BLD AUTO: 0.94 K/UL — HIGH (ref 0–0.9)
MONOCYTES NFR BLD AUTO: 12.6 % — SIGNIFICANT CHANGE UP (ref 2–14)
NEUTROPHILS # BLD AUTO: 4.9 K/UL — SIGNIFICANT CHANGE UP (ref 1.8–7.4)
NEUTROPHILS NFR BLD AUTO: 65.6 % — SIGNIFICANT CHANGE UP (ref 43–77)
PLATELET # BLD AUTO: 156 K/UL — SIGNIFICANT CHANGE UP (ref 150–400)
POTASSIUM SERPL-MCNC: 3.7 MMOL/L — SIGNIFICANT CHANGE UP (ref 3.5–5.3)
POTASSIUM SERPL-SCNC: 3.7 MMOL/L — SIGNIFICANT CHANGE UP (ref 3.5–5.3)
PROT SERPL-MCNC: 5.9 GM/DL — LOW (ref 6–8.3)
RBC # BLD: 3.75 M/UL — LOW (ref 4.2–5.8)
RBC # FLD: 14.5 % — SIGNIFICANT CHANGE UP (ref 10.3–14.5)
SODIUM SERPL-SCNC: 141 MMOL/L — SIGNIFICANT CHANGE UP (ref 135–145)
WBC # BLD: 7.47 K/UL — SIGNIFICANT CHANGE UP (ref 3.8–10.5)
WBC # FLD AUTO: 7.47 K/UL — SIGNIFICANT CHANGE UP (ref 3.8–10.5)

## 2022-07-01 PROCEDURE — 73030 X-RAY EXAM OF SHOULDER: CPT | Mod: 26,RT

## 2022-07-01 PROCEDURE — 99232 SBSQ HOSP IP/OBS MODERATE 35: CPT

## 2022-07-01 RX ORDER — MAGNESIUM SULFATE 500 MG/ML
1 VIAL (ML) INJECTION ONCE
Refills: 0 | Status: COMPLETED | OUTPATIENT
Start: 2022-07-01 | End: 2022-07-01

## 2022-07-01 RX ADMIN — Medication 1 MILLIGRAM(S): at 09:08

## 2022-07-01 RX ADMIN — APIXABAN 5 MILLIGRAM(S): 2.5 TABLET, FILM COATED ORAL at 09:08

## 2022-07-01 RX ADMIN — DOFETILIDE 250 MICROGRAM(S): 0.25 CAPSULE ORAL at 20:27

## 2022-07-01 RX ADMIN — Medication 20 MILLIGRAM(S): at 17:41

## 2022-07-01 RX ADMIN — Medication 650 MILLIGRAM(S): at 02:51

## 2022-07-01 RX ADMIN — SODIUM CHLORIDE 75 MILLILITER(S): 9 INJECTION, SOLUTION INTRAVENOUS at 06:47

## 2022-07-01 RX ADMIN — Medication 1 DROP(S): at 09:52

## 2022-07-01 RX ADMIN — Medication 100 GRAM(S): at 09:52

## 2022-07-01 RX ADMIN — ATORVASTATIN CALCIUM 10 MILLIGRAM(S): 80 TABLET, FILM COATED ORAL at 22:18

## 2022-07-01 RX ADMIN — Medication 20 MILLIGRAM(S): at 05:06

## 2022-07-01 RX ADMIN — Medication 650 MILLIGRAM(S): at 03:40

## 2022-07-01 RX ADMIN — APIXABAN 5 MILLIGRAM(S): 2.5 TABLET, FILM COATED ORAL at 22:19

## 2022-07-01 RX ADMIN — DOFETILIDE 250 MICROGRAM(S): 0.25 CAPSULE ORAL at 12:52

## 2022-07-01 RX ADMIN — CEFEPIME 1000 MILLIGRAM(S): 1 INJECTION, POWDER, FOR SOLUTION INTRAMUSCULAR; INTRAVENOUS at 09:08

## 2022-07-01 NOTE — DIETITIAN INITIAL EVALUATION ADULT - OTHER INFO
88 yo male with a pmh/o Anemia, HLD, Afib, R Renal lesion, diverticulitis, prostate ca with mets to bone, bilateral lower extremity cellulitis, and recent discharge for septic arthritis dc'd on Ancef with right PICC line.  He is sent from [facility] due to acute change in mental status.  Per the patient's wife he is usually AAOx4 and very talkative.  Today he is lethargic, not responding appropriately to questions and ill appearing.    Pt is a poor historian - RD observed breakfast tray with <50% consumed. Only ate fruit bowl, other items on tray were untouched. Noted to moderate to severe muscle/ fat wasting. Has previously been dx w/ PCM - continues to meet criteria. Pt in chair so unable to determine current BW. Last admit wt on 5/13/22 at 193#; current admit wt on 6/30 at 233#, ? accuracy. On regular diet, rec'd to continue. Denies ensure enlive but will trial gelatein BID. See other recommendations below.

## 2022-07-01 NOTE — PHYSICAL THERAPY INITIAL EVALUATION ADULT - ACTIVE RANGE OF MOTION EXAMINATION, REHAB EVAL
Except BLE Hip and Knee Flex/Ext both limited due to weakness/bilateral upper extremity Active ROM was WFL (within functional limits)/bilateral  lower extremity Active ROM was WFL (within functional limits)

## 2022-07-01 NOTE — DIETITIAN INITIAL EVALUATION ADULT - PERTINENT LABORATORY DATA
07-01    141  |  99  |  21  ----------------------------<  85  3.7   |  35<H>  |  0.84    Ca    8.4<L>      01 Jul 2022 09:02  Mg     1.9     07-01    TPro  5.9<L>  /  Alb  2.3<L>  /  TBili  0.7  /  DBili  x   /  AST  46<H>  /  ALT  <6<L>  /  AlkPhos  347<H>  07-01  Iron Total, Serum: 18 ug/dL (02-15-22 @ 17:01)

## 2022-07-01 NOTE — PROGRESS NOTE ADULT - PROBLEM SELECTOR PLAN 1
- s/p 2 L LR bolus with persistently elevated lactate  -   - received Ceftriaxone in ED. ID recc to hold any abx and fu blood cultures  - PICC line removal  - also obtain Ortho consult and follow up.-> apprec reccs-> no concern for recurrent septic arthritis at this time - s/p 2 L LR bolus with persistently elevated lactate  -   - received Ceftriaxone in ED. ID recc to hold any abx and fu blood cultures  - PICC line removal  - also obtain Ortho consult and follow up.-> apprec reccs-> no concern for recurrent septic arthritis at this time  - asymptomatic bacteruria-> will monitor off abx for now

## 2022-07-01 NOTE — PROGRESS NOTE ADULT - SUBJECTIVE AND OBJECTIVE BOX
Patient is a 87y old  Male who presents with a chief complaint of Other encephalopathy     (01 Jul 2022 12:26)      SUBJECTIVE:   HPI:  Pt is an 86 yo male with a pmh/o Anemia, HLD, Afib, R Renal lesion, diverticulitis, prostate ca with mets to bone, bilateral lower extremity cellulitis, and recent discharge for septic arthritis dc'd on Ancef with right PICC line.  He is sent from [facility] due to acute change in mental status.  Per the patient's wife he is usually AAOx4 and very talkative.  Today he is lethargic, not responding appropriately to questions and ill appearing.  Per records there is no sob, cough, chest pain, palpitations, nausea, vomiting, leg swelling, orthopnea, headache, abd pain, diarrhea, constipation, rash, paresthesias, changes in vision/hearing/speech/gait.      ED course: Lactate elevated on admission 2.7 and repeat elevated at 2.6  WBC mildly elevated  right PICC appears clean (30 Jun 2022 17:23)      sub: pt alert and oriented x 3; no complaints,. afebrile        REVIEW OF SYSTEMS:    CONSTITUTIONAL: No weakness, fevers or chills  EYES/ENT: No visual changes;  No vertigo or throat pain   NECK: No pain or stiffness  RESPIRATORY: No cough, wheezing, hemoptysis; No shortness of breath  CARDIOVASCULAR: No chest pain or palpitations  GASTROINTESTINAL: No abdominal or epigastric pain. No nausea, vomiting, or hematemesis; No diarrhea or constipation. No melena or hematochezia.  GENITOURINARY: No dysuria, frequency or hematuria  NEUROLOGICAL: No numbness or weakness  SKIN: No itching, burning, rashes, or lesions   All other review of systems is negative unless indicated above      Weight (kg): 106 (06-30 @ 19:12)    ICU Vital Signs Last 24 Hrs  T(C): 36.7 (01 Jul 2022 07:49), Max: 37 (30 Jun 2022 23:20)  T(F): 98.1 (01 Jul 2022 07:49), Max: 98.6 (30 Jun 2022 23:20)  HR: 84 (01 Jul 2022 07:49) (64 - 84)  BP: 100/54 (01 Jul 2022 07:49) (100/54 - 126/53)  BP(mean): 79 (30 Jun 2022 15:34) (79 - 79)  ABP: --  ABP(mean): --  RR: 16 (01 Jul 2022 07:49) (15 - 16)  SpO2: 97% (01 Jul 2022 07:49) (96% - 100%)        CAPILLARY BLOOD GLUCOSE          PHYSICAL EXAM:    Constitutional: NAD, awake and alert,   HEENT: PERR, EOMI, Normal Hearing, MMM  Neck: Soft and supple, No LAD, No JVD  Respiratory: Breath sounds are clear bilaterally, No wheezing, rales or rhonchi  Cardiovascular: S1 and S2, regular rate and rhythm, no Murmurs, gallops or rubs  Gastrointestinal: Bowel Sounds present, soft, nontender, nondistended, no guarding, no rebound  Extremities: No peripheral edema  Vascular: 2+ peripheral pulses  Neurological: A/O x 3, no focal deficits  Musculoskeletal: 5/5 strength b/l upper and lower extremities  Skin: No rashes    MEDICATIONS:  MEDICATIONS  (STANDING):  apixaban 5 milliGRAM(s) Oral every 12 hours  atorvastatin 10 milliGRAM(s) Oral at bedtime  dofetilide 250 MICROGram(s) Oral two times a day  folic acid 1 milliGRAM(s) Oral daily  furosemide    Tablet 20 milliGRAM(s) Oral two times a day  lactated ringers. 1000 milliLiter(s) (75 mL/Hr) IV Continuous <Continuous>  timolol 0.25% Solution 1 Drop(s) Both EYES daily      LABS: All Labs Reviewed:                        10.5   7.47  )-----------( 156      ( 01 Jul 2022 09:02 )             34.3     07-01    141  |  99  |  21  ----------------------------<  85  3.7   |  35<H>  |  0.84    Ca    8.4<L>      01 Jul 2022 09:02  Mg     1.9     07-01    TPro  5.9<L>  /  Alb  2.3<L>  /  TBili  0.7  /  DBili  x   /  AST  46<H>  /  ALT  <6<L>  /  AlkPhos  347<H>  07-01    PT/INR - ( 30 Jun 2022 10:50 )   PT: 33.0 sec;   INR: 2.82 ratio         PTT - ( 30 Jun 2022 10:50 )  PTT:35.5 sec          Blood Culture:     RADIOLOGY/EKG: reviewed

## 2022-07-01 NOTE — DIETITIAN INITIAL EVALUATION ADULT - PERTINENT MEDS FT
MEDICATIONS  (STANDING):  apixaban 5 milliGRAM(s) Oral every 12 hours  atorvastatin 10 milliGRAM(s) Oral at bedtime  dofetilide 250 MICROGram(s) Oral two times a day  folic acid 1 milliGRAM(s) Oral daily  furosemide    Tablet 20 milliGRAM(s) Oral two times a day  lactated ringers. 1000 milliLiter(s) (75 mL/Hr) IV Continuous <Continuous>  timolol 0.25% Solution 1 Drop(s) Both EYES daily    MEDICATIONS  (PRN):  acetaminophen     Tablet .. 650 milliGRAM(s) Oral every 6 hours PRN Temp greater or equal to 38C (100.4F), Mild Pain (1 - 3)  ALBUTerol    90 MICROgram(s) HFA Inhaler 1 Puff(s) Inhalation every 6 hours PRN Shortness of Breath and/or Wheezing  aluminum hydroxide/magnesium hydroxide/simethicone Suspension 30 milliLiter(s) Oral every 4 hours PRN Dyspepsia  melatonin 3 milliGRAM(s) Oral at bedtime PRN Insomnia

## 2022-07-01 NOTE — DIETITIAN INITIAL EVALUATION ADULT - ADD RECOMMEND
1) add gelatein BID to optimize PO intake and meet increased nutrient needs, 2) Encourage protein-rich foods, maximize food preferences, 3) Recommend to add MVI w/minerals, Vit C 500 mg BID, add Zinc Sulfate 220 mg x 10 days to promote wound healing, 4) Consider adding thiamine 100 mg daily 2/2 poor PO intake/ malnutrition, 5) Monitor bowel movements, if no BM for >3 days, consider implementing bowel regimen, 7) Confirm goals of care regarding nutrition support. RD will continue to monitor PO intake, labs, hydration, and wt prn.

## 2022-07-01 NOTE — PHYSICAL THERAPY INITIAL EVALUATION ADULT - MODALITIES TREATMENT COMMENTS
Pt left OOB in chair in NAD with Watson and IV both intact. CBIR. Pt instructed to not get up alone. Chair alarm activated. VIPUL haywood

## 2022-07-01 NOTE — PHYSICAL THERAPY INITIAL EVALUATION ADULT - IMPAIRMENTS FOUND, PT EVAL
arousal, attention, and cognition/cognitive impairment/gait, locomotion, and balance/muscle strength/poor safety awareness/posture/ROM

## 2022-07-01 NOTE — PHYSICAL THERAPY INITIAL EVALUATION ADULT - ADDITIONAL COMMENTS
Patient was ambulating with RW and assistance at [facility]

## 2022-07-01 NOTE — DIETITIAN INITIAL EVALUATION ADULT - NSFNSPHYEXAMSKINFT_GEN_A_CORE
Cholo score 12  Pressure Injury 1: Right:,ischium, Unstageable  Pressure Injury 2: Right:,heel, Stage II

## 2022-07-01 NOTE — PHYSICAL THERAPY INITIAL EVALUATION ADULT - IMPAIRED TRANSFERS: SIT/STAND, REHAB EVAL
Inability to achieve full upright postue due to lack of Knee Ext and Hip Ext AROM/impaired balance/cognition/impaired coordination/impaired postural control/decreased ROM/decreased strength

## 2022-07-01 NOTE — DIETITIAN INITIAL EVALUATION ADULT - NSFNSGIIOFT_GEN_A_CORE
I&O's Detail    30 Jun 2022 07:01  -  01 Jul 2022 07:00  --------------------------------------------------------  IN:    Lactated Ringers Bolus: 1000 mL  Total IN: 1000 mL    OUT:  Total OUT: 0 mL    Total NET: 1000 mL      01 Jul 2022 07:01  -  01 Jul 2022 12:39  --------------------------------------------------------  IN:    IV PiggyBack: 100 mL  Total IN: 100 mL    OUT:  Total OUT: 0 mL    Total NET: 100 mL

## 2022-07-01 NOTE — PHYSICAL THERAPY INITIAL EVALUATION ADULT - PHYSICAL ASSIST/NONPHYSICAL ASSIST: GAIT, REHAB EVAL
Tactile cues needed for gait stability, proper posture, proper use of RW/verbal cues/1 person assist

## 2022-07-02 LAB
-  AMIKACIN: SIGNIFICANT CHANGE UP
-  AZTREONAM: SIGNIFICANT CHANGE UP
-  CEFEPIME: SIGNIFICANT CHANGE UP
-  CEFTAZIDIME: SIGNIFICANT CHANGE UP
-  CIPROFLOXACIN: SIGNIFICANT CHANGE UP
-  GENTAMICIN: SIGNIFICANT CHANGE UP
-  IMIPENEM: SIGNIFICANT CHANGE UP
-  LEVOFLOXACIN: SIGNIFICANT CHANGE UP
-  MEROPENEM: SIGNIFICANT CHANGE UP
-  PIPERACILLIN/TAZOBACTAM: SIGNIFICANT CHANGE UP
-  TOBRAMYCIN: SIGNIFICANT CHANGE UP
ANION GAP SERPL CALC-SCNC: 8 MMOL/L — SIGNIFICANT CHANGE UP (ref 5–17)
BUN SERPL-MCNC: 20 MG/DL — SIGNIFICANT CHANGE UP (ref 7–23)
CALCIUM SERPL-MCNC: 8.8 MG/DL — SIGNIFICANT CHANGE UP (ref 8.5–10.1)
CHLORIDE SERPL-SCNC: 95 MMOL/L — LOW (ref 96–108)
CO2 SERPL-SCNC: 33 MMOL/L — HIGH (ref 22–31)
CREAT SERPL-MCNC: 0.93 MG/DL — SIGNIFICANT CHANGE UP (ref 0.5–1.3)
CULTURE RESULTS: SIGNIFICANT CHANGE UP
EGFR: 79 ML/MIN/1.73M2 — SIGNIFICANT CHANGE UP
GLUCOSE SERPL-MCNC: 101 MG/DL — HIGH (ref 70–99)
HCT VFR BLD CALC: 38.4 % — LOW (ref 39–50)
HGB BLD-MCNC: 11.8 G/DL — LOW (ref 13–17)
MAGNESIUM SERPL-MCNC: 2.2 MG/DL — SIGNIFICANT CHANGE UP (ref 1.6–2.6)
MCHC RBC-ENTMCNC: 28 PG — SIGNIFICANT CHANGE UP (ref 27–34)
MCHC RBC-ENTMCNC: 30.7 GM/DL — LOW (ref 32–36)
MCV RBC AUTO: 91 FL — SIGNIFICANT CHANGE UP (ref 80–100)
METHOD TYPE: SIGNIFICANT CHANGE UP
ORGANISM # SPEC MICROSCOPIC CNT: SIGNIFICANT CHANGE UP
ORGANISM # SPEC MICROSCOPIC CNT: SIGNIFICANT CHANGE UP
PLATELET # BLD AUTO: 199 K/UL — SIGNIFICANT CHANGE UP (ref 150–400)
POTASSIUM SERPL-MCNC: 3.5 MMOL/L — SIGNIFICANT CHANGE UP (ref 3.5–5.3)
POTASSIUM SERPL-SCNC: 3.5 MMOL/L — SIGNIFICANT CHANGE UP (ref 3.5–5.3)
RBC # BLD: 4.22 M/UL — SIGNIFICANT CHANGE UP (ref 4.2–5.8)
RBC # FLD: 14.5 % — SIGNIFICANT CHANGE UP (ref 10.3–14.5)
SODIUM SERPL-SCNC: 136 MMOL/L — SIGNIFICANT CHANGE UP (ref 135–145)
SPECIMEN SOURCE: SIGNIFICANT CHANGE UP
WBC # BLD: 7.98 K/UL — SIGNIFICANT CHANGE UP (ref 3.8–10.5)
WBC # FLD AUTO: 7.98 K/UL — SIGNIFICANT CHANGE UP (ref 3.8–10.5)

## 2022-07-02 PROCEDURE — 99232 SBSQ HOSP IP/OBS MODERATE 35: CPT

## 2022-07-02 RX ORDER — PIPERACILLIN AND TAZOBACTAM 4; .5 G/20ML; G/20ML
3.38 INJECTION, POWDER, LYOPHILIZED, FOR SOLUTION INTRAVENOUS ONCE
Refills: 0 | Status: COMPLETED | OUTPATIENT
Start: 2022-07-02 | End: 2022-07-02

## 2022-07-02 RX ORDER — PIPERACILLIN AND TAZOBACTAM 4; .5 G/20ML; G/20ML
3.38 INJECTION, POWDER, LYOPHILIZED, FOR SOLUTION INTRAVENOUS EVERY 8 HOURS
Refills: 0 | Status: DISCONTINUED | OUTPATIENT
Start: 2022-07-02 | End: 2022-07-06

## 2022-07-02 RX ORDER — SODIUM CHLORIDE 9 MG/ML
500 INJECTION INTRAMUSCULAR; INTRAVENOUS; SUBCUTANEOUS ONCE
Refills: 0 | Status: COMPLETED | OUTPATIENT
Start: 2022-07-02 | End: 2022-07-02

## 2022-07-02 RX ADMIN — APIXABAN 5 MILLIGRAM(S): 2.5 TABLET, FILM COATED ORAL at 10:23

## 2022-07-02 RX ADMIN — ATORVASTATIN CALCIUM 10 MILLIGRAM(S): 80 TABLET, FILM COATED ORAL at 23:02

## 2022-07-02 RX ADMIN — SODIUM CHLORIDE 1000 MILLILITER(S): 9 INJECTION INTRAMUSCULAR; INTRAVENOUS; SUBCUTANEOUS at 15:42

## 2022-07-02 RX ADMIN — DOFETILIDE 250 MICROGRAM(S): 0.25 CAPSULE ORAL at 05:21

## 2022-07-02 RX ADMIN — Medication 1 DROP(S): at 10:49

## 2022-07-02 RX ADMIN — APIXABAN 5 MILLIGRAM(S): 2.5 TABLET, FILM COATED ORAL at 23:02

## 2022-07-02 RX ADMIN — PIPERACILLIN AND TAZOBACTAM 25 GRAM(S): 4; .5 INJECTION, POWDER, LYOPHILIZED, FOR SOLUTION INTRAVENOUS at 17:04

## 2022-07-02 RX ADMIN — DOFETILIDE 250 MICROGRAM(S): 0.25 CAPSULE ORAL at 17:05

## 2022-07-02 RX ADMIN — PIPERACILLIN AND TAZOBACTAM 200 GRAM(S): 4; .5 INJECTION, POWDER, LYOPHILIZED, FOR SOLUTION INTRAVENOUS at 10:23

## 2022-07-02 RX ADMIN — PIPERACILLIN AND TAZOBACTAM 25 GRAM(S): 4; .5 INJECTION, POWDER, LYOPHILIZED, FOR SOLUTION INTRAVENOUS at 23:02

## 2022-07-02 RX ADMIN — Medication 1 MILLIGRAM(S): at 10:23

## 2022-07-02 RX ADMIN — Medication 20 MILLIGRAM(S): at 05:20

## 2022-07-02 RX ADMIN — Medication 3 MILLIGRAM(S): at 23:03

## 2022-07-02 NOTE — PROGRESS NOTE ADULT - SUBJECTIVE AND OBJECTIVE BOX
Patient is a 87y old  Male who presents with a chief complaint of Other encephalopathy     (01 Jul 2022 12:26)      SUBJECTIVE:   HPI:  Pt is an 86 yo male with a pmh/o Anemia, HLD, Afib, R Renal lesion, diverticulitis, prostate ca with mets to bone, bilateral lower extremity cellulitis, and recent discharge for septic arthritis dc'd on Ancef with right PICC line.  He is sent from [facility] due to acute change in mental status.  Per the patient's wife he is usually AAOx4 and very talkative.  Today he is lethargic, not responding appropriately to questions and ill appearing.  Per records there is no sob, cough, chest pain, palpitations, nausea, vomiting, leg swelling, orthopnea, headache, abd pain, diarrhea, constipation, rash, paresthesias, changes in vision/hearing/speech/gait.      ED course: Lactate elevated on admission 2.7 and repeat elevated at 2.6  WBC mildly elevated  right PICC appears clean (30 Jun 2022 17:23)      sub: pt alert and oriented x 3; no complaints,. afebrile. Ucx: PSAE        REVIEW OF SYSTEMS:    CONSTITUTIONAL: No weakness, fevers or chills  EYES/ENT: No visual changes;  No vertigo or throat pain   NECK: No pain or stiffness  RESPIRATORY: No cough, wheezing, hemoptysis; No shortness of breath  CARDIOVASCULAR: No chest pain or palpitations  GASTROINTESTINAL: No abdominal or epigastric pain. No nausea, vomiting, or hematemesis; No diarrhea or constipation. No melena or hematochezia.  GENITOURINARY: No dysuria, frequency or hematuria  NEUROLOGICAL: No numbness or weakness  SKIN: No itching, burning, rashes, or lesions   All other review of systems is negative unless indicated above      ICU Vital Signs Last 24 Hrs  T(C): 36.5 (02 Jul 2022 08:04), Max: 36.6 (01 Jul 2022 23:05)  T(F): 97.7 (02 Jul 2022 08:04), Max: 97.9 (01 Jul 2022 23:05)  HR: 76 (02 Jul 2022 08:04) (75 - 84)  BP: 119/57 (02 Jul 2022 08:04) (112/53 - 119/57)  BP(mean): --  ABP: --  ABP(mean): --  RR: 18 (02 Jul 2022 08:04) (17 - 18)  SpO2: 94% (02 Jul 2022 08:04) (91% - 99%)          CAPILLARY BLOOD GLUCOSE          PHYSICAL EXAM:    Constitutional: NAD, awake and alert,   HEENT: PERR, EOMI, Normal Hearing, MMM  Neck: Soft and supple, No LAD, No JVD  Respiratory: Breath sounds are clear bilaterally, No wheezing, rales or rhonchi  Cardiovascular: S1 and S2, regular rate and rhythm, no Murmurs, gallops or rubs  Gastrointestinal: Bowel Sounds present, soft, nontender, nondistended, no guarding, no rebound  Extremities: No peripheral edema  Vascular: 2+ peripheral pulses  Neurological: A/O x 3, no focal deficits  Musculoskeletal: 5/5 strength b/l upper and lower extremities  Skin: No rashes    MEDICATIONS:  MEDICATIONS  (STANDING):  apixaban 5 milliGRAM(s) Oral every 12 hours  atorvastatin 10 milliGRAM(s) Oral at bedtime  dofetilide 250 MICROGram(s) Oral two times a day  folic acid 1 milliGRAM(s) Oral daily  furosemide    Tablet 20 milliGRAM(s) Oral two times a day  lactated ringers. 1000 milliLiter(s) (75 mL/Hr) IV Continuous <Continuous>  timolol 0.25% Solution 1 Drop(s) Both EYES daily      LABS: All Labs Reviewed:                        10.5   7.47  )-----------( 156      ( 01 Jul 2022 09:02 )             34.3     07-01    141  |  99  |  21  ----------------------------<  85  3.7   |  35<H>  |  0.84    Ca    8.4<L>      01 Jul 2022 09:02  Mg     1.9     07-01    TPro  5.9<L>  /  Alb  2.3<L>  /  TBili  0.7  /  DBili  x   /  AST  46<H>  /  ALT  <6<L>  /  AlkPhos  347<H>  07-01    PT/INR - ( 30 Jun 2022 10:50 )   PT: 33.0 sec;   INR: 2.82 ratio         PTT - ( 30 Jun 2022 10:50 )  PTT:35.5 sec          Blood Culture:     RADIOLOGY/EKG: reviewed

## 2022-07-02 NOTE — PROGRESS NOTE ADULT - PROBLEM SELECTOR PLAN 1
- s/p 2 L LR bolus with persistently elevated lactate  - received Ceftriaxone in ED. ID recc to hold any abx and fu blood cultures however pt p/w sepsis and elevated lactate with potential source being in the  system  - will treat with zosyn and f/u blood and urine cultures  - PICC line removed  - also obtain Ortho consult and follow up.-> apprec reccs-> no concern for recurrent septic arthritis at this time  - metabolic encephalopathy resolved. Made wife Rafia aware-> the request home with home PT once medically stable  - anticipate dc in next 24 hours

## 2022-07-03 LAB
HCT VFR BLD CALC: 33.7 % — LOW (ref 39–50)
HGB BLD-MCNC: 10.6 G/DL — LOW (ref 13–17)
MCHC RBC-ENTMCNC: 28.6 PG — SIGNIFICANT CHANGE UP (ref 27–34)
MCHC RBC-ENTMCNC: 31.5 GM/DL — LOW (ref 32–36)
MCV RBC AUTO: 90.8 FL — SIGNIFICANT CHANGE UP (ref 80–100)
PLATELET # BLD AUTO: 171 K/UL — SIGNIFICANT CHANGE UP (ref 150–400)
RBC # BLD: 3.71 M/UL — LOW (ref 4.2–5.8)
RBC # FLD: 14.3 % — SIGNIFICANT CHANGE UP (ref 10.3–14.5)
WBC # BLD: 6.25 K/UL — SIGNIFICANT CHANGE UP (ref 3.8–10.5)
WBC # FLD AUTO: 6.25 K/UL — SIGNIFICANT CHANGE UP (ref 3.8–10.5)

## 2022-07-03 PROCEDURE — 99232 SBSQ HOSP IP/OBS MODERATE 35: CPT

## 2022-07-03 RX ADMIN — Medication 20 MILLIGRAM(S): at 06:31

## 2022-07-03 RX ADMIN — DOFETILIDE 250 MICROGRAM(S): 0.25 CAPSULE ORAL at 06:31

## 2022-07-03 RX ADMIN — ATORVASTATIN CALCIUM 10 MILLIGRAM(S): 80 TABLET, FILM COATED ORAL at 21:18

## 2022-07-03 RX ADMIN — DOFETILIDE 250 MICROGRAM(S): 0.25 CAPSULE ORAL at 17:42

## 2022-07-03 RX ADMIN — PIPERACILLIN AND TAZOBACTAM 25 GRAM(S): 4; .5 INJECTION, POWDER, LYOPHILIZED, FOR SOLUTION INTRAVENOUS at 14:54

## 2022-07-03 RX ADMIN — APIXABAN 5 MILLIGRAM(S): 2.5 TABLET, FILM COATED ORAL at 09:37

## 2022-07-03 RX ADMIN — Medication 1 DROP(S): at 09:37

## 2022-07-03 RX ADMIN — APIXABAN 5 MILLIGRAM(S): 2.5 TABLET, FILM COATED ORAL at 21:18

## 2022-07-03 RX ADMIN — PIPERACILLIN AND TAZOBACTAM 25 GRAM(S): 4; .5 INJECTION, POWDER, LYOPHILIZED, FOR SOLUTION INTRAVENOUS at 21:18

## 2022-07-03 RX ADMIN — PIPERACILLIN AND TAZOBACTAM 25 GRAM(S): 4; .5 INJECTION, POWDER, LYOPHILIZED, FOR SOLUTION INTRAVENOUS at 06:31

## 2022-07-03 RX ADMIN — Medication 1 MILLIGRAM(S): at 09:38

## 2022-07-03 NOTE — PROGRESS NOTE ADULT - TIME BILLING
care coordination  review of labs and imaging studies.  discussion and consultation.

## 2022-07-03 NOTE — PROGRESS NOTE ADULT - PROBLEM SELECTOR PLAN 1
- will treat with zosyn and f/u blood and urine cultures. will rotate to cipro unon d. AWaiting to hear from family reagrding home care/support system  - PICC line removed  - also obtain Ortho consult and follow up.-> apprec reccs-> no concern for recurrent septic arthritis at this time  - metabolic encephalopathy resolved. Made wife Rafia aware-> the request home with home PT once medically stable  - anticipate dc in next 24 hours

## 2022-07-03 NOTE — PROGRESS NOTE ADULT - SUBJECTIVE AND OBJECTIVE BOX
Patient is a 87y old  Male who presents with a chief complaint of Other encephalopathy     (01 Jul 2022 12:26)      SUBJECTIVE:   HPI:  Pt is an 88 yo male with a pmh/o Anemia, HLD, Afib, R Renal lesion, diverticulitis, prostate ca with mets to bone, bilateral lower extremity cellulitis, and recent discharge for septic arthritis dc'd on Ancef with right PICC line.  He is sent from [facility] due to acute change in mental status.  Per the patient's wife he is usually AAOx4 and very talkative.  Today he is lethargic, not responding appropriately to questions and ill appearing.  Per records there is no sob, cough, chest pain, palpitations, nausea, vomiting, leg swelling, orthopnea, headache, abd pain, diarrhea, constipation, rash, paresthesias, changes in vision/hearing/speech/gait.      ED course: Lactate elevated on admission 2.7 and repeat elevated at 2.6  WBC mildly elevated  right PICC appears clean (30 Jun 2022 17:23)      sub: pt alert and oriented x 3; no complaints,. afebrile. Ucx: PSAE; pt unsteady ambulating, wife and pt not interested in [facility]        REVIEW OF SYSTEMS:    CONSTITUTIONAL: No weakness, fevers or chills  EYES/ENT: No visual changes;  No vertigo or throat pain   NECK: No pain or stiffness  RESPIRATORY: No cough, wheezing, hemoptysis; No shortness of breath  CARDIOVASCULAR: No chest pain or palpitations  GASTROINTESTINAL: No abdominal or epigastric pain. No nausea, vomiting, or hematemesis; No diarrhea or constipation. No melena or hematochezia.  GENITOURINARY: No dysuria, frequency or hematuria  NEUROLOGICAL: No numbness or weakness  SKIN: No itching, burning, rashes, or lesions   All other review of systems is negative unless indicated above      ICU Vital Signs Last 24 Hrs  T(C): 36.8 (03 Jul 2022 08:11), Max: 36.8 (02 Jul 2022 15:15)  T(F): 98.2 (03 Jul 2022 08:11), Max: 98.2 (02 Jul 2022 15:15)  HR: 70 (03 Jul 2022 08:11) (64 - 71)  BP: 104/54 (03 Jul 2022 08:11) (102/54 - 132/54)  BP(mean): --  ABP: --  ABP(mean): --  RR: 18 (03 Jul 2022 08:11) (18 - 18)  SpO2: 98% (03 Jul 2022 08:11) (97% - 98%)        CAPILLARY BLOOD GLUCOSE          PHYSICAL EXAM:    Constitutional: NAD, awake and alert,   HEENT: PERR, EOMI, Normal Hearing, MMM  Neck: Soft and supple, No LAD, No JVD  Respiratory: Breath sounds are clear bilaterally, No wheezing, rales or rhonchi  Cardiovascular: S1 and S2, regular rate and rhythm, no Murmurs, gallops or rubs  Gastrointestinal: Bowel Sounds present, soft, nontender, nondistended, no guarding, no rebound  Extremities: No peripheral edema  Vascular: 2+ peripheral pulses  Neurological: A/O x 3, no focal deficits  Musculoskeletal: 5/5 strength b/l upper and lower extremities  Skin: No rashes    MEDICATIONS:  MEDICATIONS  (STANDING):  apixaban 5 milliGRAM(s) Oral every 12 hours  atorvastatin 10 milliGRAM(s) Oral at bedtime  dofetilide 250 MICROGram(s) Oral two times a day  folic acid 1 milliGRAM(s) Oral daily  furosemide    Tablet 20 milliGRAM(s) Oral two times a day  lactated ringers. 1000 milliLiter(s) (75 mL/Hr) IV Continuous <Continuous>  timolol 0.25% Solution 1 Drop(s) Both EYES daily      LABS: All Labs Reviewed:                        10.5   7.47  )-----------( 156      ( 01 Jul 2022 09:02 )             34.3     07-01    141  |  99  |  21  ----------------------------<  85  3.7   |  35<H>  |  0.84    Ca    8.4<L>      01 Jul 2022 09:02  Mg     1.9     07-01    TPro  5.9<L>  /  Alb  2.3<L>  /  TBili  0.7  /  DBili  x   /  AST  46<H>  /  ALT  <6<L>  /  AlkPhos  347<H>  07-01    PT/INR - ( 30 Jun 2022 10:50 )   PT: 33.0 sec;   INR: 2.82 ratio         PTT - ( 30 Jun 2022 10:50 )  PTT:35.5 sec          Blood Culture:     RADIOLOGY/EKG: reviewed

## 2022-07-04 LAB
HCT VFR BLD CALC: 35.1 % — LOW (ref 39–50)
HGB BLD-MCNC: 10.8 G/DL — LOW (ref 13–17)
MCHC RBC-ENTMCNC: 28.1 PG — SIGNIFICANT CHANGE UP (ref 27–34)
MCHC RBC-ENTMCNC: 30.8 GM/DL — LOW (ref 32–36)
MCV RBC AUTO: 91.2 FL — SIGNIFICANT CHANGE UP (ref 80–100)
PLATELET # BLD AUTO: 204 K/UL — SIGNIFICANT CHANGE UP (ref 150–400)
RBC # BLD: 3.85 M/UL — LOW (ref 4.2–5.8)
RBC # FLD: 14.4 % — SIGNIFICANT CHANGE UP (ref 10.3–14.5)
WBC # BLD: 6.32 K/UL — SIGNIFICANT CHANGE UP (ref 3.8–10.5)
WBC # FLD AUTO: 6.32 K/UL — SIGNIFICANT CHANGE UP (ref 3.8–10.5)

## 2022-07-04 PROCEDURE — 99232 SBSQ HOSP IP/OBS MODERATE 35: CPT

## 2022-07-04 RX ADMIN — Medication 3 MILLIGRAM(S): at 22:27

## 2022-07-04 RX ADMIN — DOFETILIDE 250 MICROGRAM(S): 0.25 CAPSULE ORAL at 05:21

## 2022-07-04 RX ADMIN — PIPERACILLIN AND TAZOBACTAM 25 GRAM(S): 4; .5 INJECTION, POWDER, LYOPHILIZED, FOR SOLUTION INTRAVENOUS at 05:21

## 2022-07-04 RX ADMIN — APIXABAN 5 MILLIGRAM(S): 2.5 TABLET, FILM COATED ORAL at 09:39

## 2022-07-04 RX ADMIN — PIPERACILLIN AND TAZOBACTAM 25 GRAM(S): 4; .5 INJECTION, POWDER, LYOPHILIZED, FOR SOLUTION INTRAVENOUS at 13:31

## 2022-07-04 RX ADMIN — ATORVASTATIN CALCIUM 10 MILLIGRAM(S): 80 TABLET, FILM COATED ORAL at 22:27

## 2022-07-04 RX ADMIN — DOFETILIDE 250 MICROGRAM(S): 0.25 CAPSULE ORAL at 17:09

## 2022-07-04 RX ADMIN — Medication 20 MILLIGRAM(S): at 13:33

## 2022-07-04 RX ADMIN — APIXABAN 5 MILLIGRAM(S): 2.5 TABLET, FILM COATED ORAL at 22:26

## 2022-07-04 RX ADMIN — Medication 1 DROP(S): at 09:39

## 2022-07-04 RX ADMIN — Medication 1 MILLIGRAM(S): at 09:39

## 2022-07-04 RX ADMIN — PIPERACILLIN AND TAZOBACTAM 25 GRAM(S): 4; .5 INJECTION, POWDER, LYOPHILIZED, FOR SOLUTION INTRAVENOUS at 22:26

## 2022-07-04 RX ADMIN — Medication 20 MILLIGRAM(S): at 05:21

## 2022-07-04 NOTE — PROGRESS NOTE ADULT - PROBLEM SELECTOR PLAN 6
- monitor for signs of overload after 3000 ml LR  - resume Lasix 20 mg BID

## 2022-07-04 NOTE — PROGRESS NOTE ADULT - NSPROGADDITIONALINFOA_GEN_ALL_CORE
Dispo: PT consult and monitor for fever and f/u culture data

## 2022-07-04 NOTE — PROGRESS NOTE ADULT - PROBLEM SELECTOR PLAN 1
- will treat with zosyn and f/u blood and urine cultures. [facility]; Made wife aware that he is Full assist and extrmely weak; she will need to decide on sarath vs HHA prior to dc  - PICC line removed  - also obtain Ortho consult and follow up.-> apprec reccs-> no concern for recurrent septic arthritis at this time  - metabolic encephalopathy resolved. Made wife Pat aware-> the request home with home PT once medically stable  - anticipate dc in next 24 hours when family makes proper arrangements for supportive care at home

## 2022-07-04 NOTE — PROGRESS NOTE ADULT - SUBJECTIVE AND OBJECTIVE BOX
Patient is a 87y old  Male who presents with a chief complaint of Other encephalopathy     (01 Jul 2022 12:26)      SUBJECTIVE:   HPI:  Pt is an 86 yo male with a pmh/o Anemia, HLD, Afib, R Renal lesion, diverticulitis, prostate ca with mets to bone, bilateral lower extremity cellulitis, and recent discharge for septic arthritis dc'd on Ancef with right PICC line.  He is sent from [facility] due to acute change in mental status.  Per the patient's wife he is usually AAOx4 and very talkative.  Today he is lethargic, not responding appropriately to questions and ill appearing.  Per records there is no sob, cough, chest pain, palpitations, nausea, vomiting, leg swelling, orthopnea, headache, abd pain, diarrhea, constipation, rash, paresthesias, changes in vision/hearing/speech/gait.      ED course: Lactate elevated on admission 2.7 and repeat elevated at 2.6  WBC mildly elevated  right PICC appears clean (30 Jun 2022 17:23)      sub: pt alert and oriented x 3; extremely weak; full max assist; spoke with wife, he is too unsteady to go home without aides        REVIEW OF SYSTEMS:    CONSTITUTIONAL: No weakness, fevers or chills  EYES/ENT: No visual changes;  No vertigo or throat pain   NECK: No pain or stiffness  RESPIRATORY: No cough, wheezing, hemoptysis; No shortness of breath  CARDIOVASCULAR: No chest pain or palpitations  GASTROINTESTINAL: No abdominal or epigastric pain. No nausea, vomiting, or hematemesis; No diarrhea or constipation. No melena or hematochezia.  GENITOURINARY: No dysuria, frequency or hematuria  NEUROLOGICAL: No numbness or weakness  SKIN: No itching, burning, rashes, or lesions   All other review of systems is negative unless indicated above      ICU Vital Signs Last 24 Hrs  T(C): 36.3 (04 Jul 2022 08:03), Max: 37.1 (03 Jul 2022 15:03)  T(F): 97.4 (04 Jul 2022 08:03), Max: 98.7 (03 Jul 2022 15:03)  HR: 73 (04 Jul 2022 08:03) (72 - 73)  BP: 112/51 (04 Jul 2022 08:03) (100/47 - 129/46)  BP(mean): --  ABP: --  ABP(mean): --  RR: 16 (04 Jul 2022 08:03) (16 - 16)  SpO2: 98% (04 Jul 2022 08:03) (96% - 98%)          CAPILLARY BLOOD GLUCOSE          PHYSICAL EXAM:    Constitutional: NAD, awake and alert,   HEENT: PERR, EOMI, Normal Hearing, MMM  Neck: Soft and supple, No LAD, No JVD  Respiratory: Breath sounds are clear bilaterally, No wheezing, rales or rhonchi  Cardiovascular: S1 and S2, regular rate and rhythm, no Murmurs, gallops or rubs  Gastrointestinal: Bowel Sounds present, soft, nontender, nondistended, no guarding, no rebound  Extremities: No peripheral edema  Vascular: 2+ peripheral pulses  Neurological: A/O x 3, no focal deficits  Musculoskeletal: 5/5 strength b/l upper and lower extremities  Skin: No rashes    MEDICATIONS:  MEDICATIONS  (STANDING):  apixaban 5 milliGRAM(s) Oral every 12 hours  atorvastatin 10 milliGRAM(s) Oral at bedtime  dofetilide 250 MICROGram(s) Oral two times a day  folic acid 1 milliGRAM(s) Oral daily  furosemide    Tablet 20 milliGRAM(s) Oral two times a day  lactated ringers. 1000 milliLiter(s) (75 mL/Hr) IV Continuous <Continuous>  timolol 0.25% Solution 1 Drop(s) Both EYES daily      LABS: All Labs Reviewed:                        10.5   7.47  )-----------( 156      ( 01 Jul 2022 09:02 )             34.3     07-01    141  |  99  |  21  ----------------------------<  85  3.7   |  35<H>  |  0.84    Ca    8.4<L>      01 Jul 2022 09:02  Mg     1.9     07-01    TPro  5.9<L>  /  Alb  2.3<L>  /  TBili  0.7  /  DBili  x   /  AST  46<H>  /  ALT  <6<L>  /  AlkPhos  347<H>  07-01    PT/INR - ( 30 Jun 2022 10:50 )   PT: 33.0 sec;   INR: 2.82 ratio         PTT - ( 30 Jun 2022 10:50 )  PTT:35.5 sec          Blood Culture:     RADIOLOGY/EKG: reviewed

## 2022-07-04 NOTE — PROGRESS NOTE ADULT - PROBLEM SELECTOR PLAN 4
- resume home medications

## 2022-07-04 NOTE — PROGRESS NOTE ADULT - NUTRITIONAL ASSESSMENT
This patient has been assessed with a concern for Malnutrition and has been determined to have a diagnosis/diagnoses of Severe protein-calorie malnutrition.    This patient is being managed with:   Diet Regular-  Entered: Jun 30 2022  1:17PM    

## 2022-07-04 NOTE — PROGRESS NOTE ADULT - PROBLEM SELECTOR PLAN 3
- hold CA meds in setting of acute infectious disease.  Resume on d/c
- hold CA meds in setting of acute infectious disease.  Resume on d/c
- hold CA meds in setting of acute infectious disease.  Resume on d/c  - onc f/u outpatient
- hold CA meds in setting of acute infectious disease.  Resume on d/c

## 2022-07-05 LAB
CULTURE RESULTS: SIGNIFICANT CHANGE UP
CULTURE RESULTS: SIGNIFICANT CHANGE UP
SARS-COV-2 RNA SPEC QL NAA+PROBE: SIGNIFICANT CHANGE UP
SPECIMEN SOURCE: SIGNIFICANT CHANGE UP
SPECIMEN SOURCE: SIGNIFICANT CHANGE UP

## 2022-07-05 PROCEDURE — 99222 1ST HOSP IP/OBS MODERATE 55: CPT

## 2022-07-05 PROCEDURE — 99239 HOSP IP/OBS DSCHRG MGMT >30: CPT

## 2022-07-05 RX ORDER — SOD,AMMONIUM,POTASSIUM LACTATE
1 CREAM (GRAM) TOPICAL
Refills: 0 | Status: DISCONTINUED | OUTPATIENT
Start: 2022-07-05 | End: 2022-07-06

## 2022-07-05 RX ADMIN — APIXABAN 5 MILLIGRAM(S): 2.5 TABLET, FILM COATED ORAL at 09:22

## 2022-07-05 RX ADMIN — Medication 20 MILLIGRAM(S): at 05:28

## 2022-07-05 RX ADMIN — Medication 650 MILLIGRAM(S): at 20:10

## 2022-07-05 RX ADMIN — PIPERACILLIN AND TAZOBACTAM 25 GRAM(S): 4; .5 INJECTION, POWDER, LYOPHILIZED, FOR SOLUTION INTRAVENOUS at 13:56

## 2022-07-05 RX ADMIN — PIPERACILLIN AND TAZOBACTAM 25 GRAM(S): 4; .5 INJECTION, POWDER, LYOPHILIZED, FOR SOLUTION INTRAVENOUS at 21:29

## 2022-07-05 RX ADMIN — Medication 1 APPLICATION(S): at 21:29

## 2022-07-05 RX ADMIN — ATORVASTATIN CALCIUM 10 MILLIGRAM(S): 80 TABLET, FILM COATED ORAL at 21:29

## 2022-07-05 RX ADMIN — APIXABAN 5 MILLIGRAM(S): 2.5 TABLET, FILM COATED ORAL at 21:29

## 2022-07-05 RX ADMIN — PIPERACILLIN AND TAZOBACTAM 25 GRAM(S): 4; .5 INJECTION, POWDER, LYOPHILIZED, FOR SOLUTION INTRAVENOUS at 05:26

## 2022-07-05 RX ADMIN — DOFETILIDE 250 MICROGRAM(S): 0.25 CAPSULE ORAL at 05:28

## 2022-07-05 RX ADMIN — Medication 650 MILLIGRAM(S): at 19:41

## 2022-07-05 RX ADMIN — Medication 1 DROP(S): at 09:15

## 2022-07-05 RX ADMIN — Medication 1 MILLIGRAM(S): at 09:15

## 2022-07-05 RX ADMIN — DOFETILIDE 250 MICROGRAM(S): 0.25 CAPSULE ORAL at 17:04

## 2022-07-05 RX ADMIN — Medication 20 MILLIGRAM(S): at 13:56

## 2022-07-05 NOTE — CDI QUERY NOTE - NSCDIOTHERTXTBX_GEN_ALL_CORE_HH
This patient was admitted with metabolic encephalopathy and your clarification is needed regarding the diagnosis of sepsis:    ED Provider Note 6-30-22 @ 10:07  Will do speiss work up and CT head, dispo accordingly. Likely will require admission. PICC may need to be removed as well.      SEPSIS – was this patient treated for sepsis? Yes.     HP Adult 6-30-22 @ 17:23  86 y/o male with altered mental status  Admit to med/surg  Septiic workup in progress    Problem/Plan - 1:  ·  Problem: Septic encephalopathy.   ·  Plan: - s/p 2 L LR bolus with persistently elevated lactate  - will give additional unit  - received Ceftriaxone in ED but will continue with Cefepime  - check blood cultures  - PICC line must be removed and cultured, please call PA for removal and send culture of tip  - ID consult - Dr. Dowd  - also obtain Ortho consult and follow up    Progress Note Adult Hospitalist Attending 7-4-22 @ 12:42  Problem/Plan - 1:  ·  Problem: Septic encephalopathy.   ·  Plan: - will treat with zosyn and f/u blood and urine cultures. will rotate to cipro when discharged; Made wife aware that he is Full assist and extrmely weak; she will need to decide on sarath vs HHA prior to dc  - PICC line removed  - also obtain Ortho consult and follow up.-> apprec reccs-> no concern for recurrent septic arthritis at this time  - metabolic encephalopathy resolved. Made wife Pat aware-> the request home with home PT once medically stable  - anticipate dc in next 24 hours when family makes proper arrangements for supportive care at home.    Progress Note Adult Hospitalist PA 7-5-22 @ 13:51  #Encephalopathy likely d/t UTI   - UA positive, UCx 6/30 growing Pseudomonas   - BCx negative, CXR/CT head/ CT A/P negative for acute pathology   - Continue IV zosyn, transition to PO cipro on discharge   - PICC line removed  - also obtain Ortho consult and follow up.-> apprec reccs-> no concern for recurrent septic arthritis at this time  - metabolic encephalopathy resolved. Made wife Pat aware-> wife wanted patient to go home but is too weak requiring max assist, wife now in agreement with SARATH placement     This patient does not meet any SIRS criteria upon or throughout admission.    Can the diagnosis of sepsis be clarified?  A) Sepsis, ruled out.  B) Sepsis, ruled in. Please identify criteria you are using to make your diagnosis.  C) Other, please specify:  D) Unable to determine.

## 2022-07-05 NOTE — CONSULT NOTE ADULT - ASSESSMENT
Pt is a 87y old Male with hx of Anemia, HLD, Afib, R Renal lesion, diverticulitis, prostate ca with mets to bone, bilateral lower extremity cellulitis, and recent discharge for septic arthritis dc'd on Ancef with right PICC line.  He is sent from [facility] due to acute change in mental status.  Per the patient's wife he is usually AAOx4 and very talkative.  Today he is lethargic, not responding appropriately to questions and ill appearing.  Per records there is no sob, cough, chest pain, palpitations, nausea, vomiting, leg swelling, orthopnea, headache, abd pain, diarrhea, constipation, rash, paresthesias, changes in vision/hearing/speech/gait.  Palliative medicine consulted to help establish GOC and advance care planning     1) AMS   - encephalopathy   - c/w zosyn   - patient has PICC line remove on admission for history of septic arthritis   - ortho consult appreciated     2)  Prostate cancer   - f/u with outpatient oncologist   - Increasing diffuse sclerotic lesions, likely metastatic.    Process of Care  --Reviewed dx/treatment problems and alignment with Goals of Care    Physical Aspects of Care  --Pain  patient denies at this time  c/w current managment    --Bowel Regimen  denies constipation  risk for constipation d/t immobility  daily dulcolax    --Dyspnea  No SOB at this time  comfortable and in NAD    --Nausea Vomiting  denies    --Weakness  PT as tolerated     Psychological and Psychiatric Aspects of Care:   --Greif/Bereavment: emotional support provided  --Hx of psychiatric dx: none  -Pastoral Care Available PRN     Social Aspects of Care  -SW involved     Cultural Aspects  -Primary Language: English    Goals of Care:     We discussed Palliative Care team being a supportive team when a patient has ongoing illnesses.  We also discussed that it is not an end of life care service, but can help navigate symptoms and emotional support througout their hospital stay here.    Ethical and Legal Aspects:   NA    Capacity- patient at this time does not have capacity     HCP/Surrogate: HCP on file naming Nanette     Code Status- full code   MOLST-  Dispo Plan-    Discussed With: Dr. Parmar coordinated with attending and SW and RN     Time Spent: 90 minutes including the care, coordination and counseling of this patient, 50% of which was spent coordinating and counseling. 
Pt is an 88 yo male with a pmh/o Anemia, HLD, Afib, R Renal lesion, diverticulitis, prostate ca with mets to bone, bilateral lower extremity cellulitis, and recent discharge for septic arthritis of right shoulder, was on ancef via picc line which has ended on 6/25 admitted on 6/30 from snf for evaluation of change in mental status, increased lethargy. The patient cannot provide history and history per medical record. He is noted with skin breakdown on right ischium.     1. Patient admitted with change in mental status; unclear etiology, no fever noted since admission  - follow up cultures   - serial cbc and monitor temperature   - reviewed prior medical records to evaluate for resistant or atypical pathogens   - iv hydration and supportive care   - completed course of ancef  - will have picc line removed  - to have wound care nurse evaluated right skin over ischium, developed while on ancef  - favor observe off antibiotics, therefore will stop cefepime  - physical therapy evaluation  2. other issues; per medicine

## 2022-07-05 NOTE — ADVANCED PRACTICE NURSE CONSULT - ASSESSMENT
This is a 87 year old male admitted to the hospital on 6/30/2022 for altered mental status and admit diagnosis of encephalopathy with a PMHx: PVD, HLD, prostate cancer, afib, CHF.    Patient assessed on 2 SW sitting in the bedside chair on an air waffle cushion. Bilateral Lower extremities with +3 edema, hemosiderin staining and dry scaling skin. Will apply Lac-Hydrin to lower extremities for dryness.   Noted on bilateral gluteal cleft, sacrum and ischium with nonblanchable hyperpigmentation which is indicative of chronic friction and pressure. To the left Sacral region to upper gluteal cleft with 4.8cm x 1.9cm x 0.1cm with pink wound bed and nonblachable hyperpigmented periwound, this can be classified as a stage 2 pressure injury present on admission.   Right Ischium with 2.1cm x 1.8cm 0.2cm with 100% yellow firmly attached slough with hyperpigmented skin to periwound. This can be classified as an unstageable pressure injury present on admission. Applied Triad to both wound to protect against irritants and aid in debridement of right ischial wound.   Bilateral Upper extremities with many ecchymotic areas and on the left forearm with type 2 partial thickness skin tear partial flap. Bedside Rn reported that it was bleeding because patient is on Eliquis. Applied adaptic and foam with light compression       When patient is out of bed to chair to maintain Air Waffle cushion   Maintain Low Air Loss Centrella mattress for pressure redistribution  Lac-Hydrin to Lower extremities for dry scaling skin  Keep Heels elevated off mattress when in bed

## 2022-07-05 NOTE — CONSULT NOTE ADULT - SUBJECTIVE AND OBJECTIVE BOX
Patient is a 87y old  Male who presents with a chief complaint of altered mental status (2022 10:36)    HPI:  Pt is an 86 yo male with a pmh/o Anemia, HLD, Afib, R Renal lesion, diverticulitis, prostate ca with mets to bone, bilateral lower extremity cellulitis, and recent discharge for septic arthritis of right shoulder, was on ancef via picc line which has ended on  admitted on  from snf for evaluation of change in mental status, increased lethargy. The patient cannot provide history and history per medical record. He is noted with skin breakdown on right ischium.           PMH: as above  PSH: as above  Meds: per reconciliation sheet, noted below  MEDICATIONS  (STANDING):  apixaban 5 milliGRAM(s) Oral every 12 hours  atorvastatin 10 milliGRAM(s) Oral at bedtime  cefepime  Injectable. 1000 milliGRAM(s) IV Push every 12 hours  dofetilide 250 MICROGram(s) Oral two times a day  folic acid 1 milliGRAM(s) Oral daily  furosemide    Tablet 20 milliGRAM(s) Oral two times a day  lactated ringers. 1000 milliLiter(s) (75 mL/Hr) IV Continuous <Continuous>  timolol 0.25% Solution 1 Drop(s) Both EYES daily    MEDICATIONS  (PRN):  acetaminophen     Tablet .. 650 milliGRAM(s) Oral every 6 hours PRN Temp greater or equal to 38C (100.4F), Mild Pain (1 - 3)  ALBUTerol    90 MICROgram(s) HFA Inhaler 1 Puff(s) Inhalation every 6 hours PRN Shortness of Breath and/or Wheezing  aluminum hydroxide/magnesium hydroxide/simethicone Suspension 30 milliLiter(s) Oral every 4 hours PRN Dyspepsia  melatonin 3 milliGRAM(s) Oral at bedtime PRN Insomnia    Allergies    No Known Allergies    Intolerances      Social: no smoking, no alcohol, no illegal drugs; no recent travel, no exposure to TB  FAMILY HISTORY:  Patient&#x27;s mother is  (Mother)    Patient&#x27;s father is  (Father)       ROS unable to obtain secondary to patient medical condition     Vital Signs Last 24 Hrs  T(C): 36.7 (2022 07:49), Max: 37 (2022 23:20)  T(F): 98.1 (2022 07:49), Max: 98.6 (2022 23:20)  HR: 84 (2022 07:49) (64 - 84)  BP: 100/54 (2022 07:49) (100/54 - 126/53)  BP(mean): 79 (2022 15:34) (79 - 79)  RR: 16 (2022 07:49) (15 - 16)  SpO2: 97% (2022 07:49) (96% - 100%)  Daily     Daily     PE:    Constitutional: frail looking  HEENT: NC/AT, EOMI, PERRLA, conjunctivae clear; ears and nose atraumatic; pharynx clear  Neck: supple; thyroid not palpable  Back: no tenderness  Respiratory: respiratory effort normal; clear to auscultation  Cardiovascular: S1S2 regular, no murmurs  Abdomen: soft, not tender, not distended, positive BS; no liver or spleen organomegaly  Genitourinary: no suprapubic tenderness  Musculoskeletal: no muscle tenderness, no joint swelling or tenderness  Neurological/ Psychiatric:  moving all extremities  Skin: no rashes; no palpable lesions, right ischium with skin desquamation    Labs: all available labs reviewed                        10.5   7.47  )-----------( 156      ( 2022 09:02 )             34.3     07-01    141  |  99  |  21  ----------------------------<  85  3.7   |  35<H>  |  0.84    Ca    8.4<L>      2022 09:02  Mg     1.9     07-    TPro  5.9<L>  /  Alb  2.3<L>  /  TBili  0.7  /  DBili  x   /  AST  46<H>  /  ALT  <6<L>  /  AlkPhos  347<H>  07-     LIVER FUNCTIONS - ( 2022 09:02 )  Alb: 2.3 g/dL / Pro: 5.9 gm/dL / ALK PHOS: 347 U/L / ALT: <6 U/L / AST: 46 U/L / GGT: x           Urinalysis Basic - ( 2022 13:38 )    Color: Yellow / Appearance: Slightly Turbid / S.010 / pH: x  Gluc: x / Ketone: Negative  / Bili: Negative / Urobili: 4   Blood: x / Protein: 15 / Nitrite: Negative   Leuk Esterase: Moderate / RBC: 11-25 /HPF / WBC >50   Sq Epi: x / Non Sq Epi: Negative / Bacteria: Few      < from: CT Abdomen and Pelvis No Cont (22 @ 14:06) >    ACC: [id number] EXAM:  CT ABDOMEN AND PELVIS                          PROCEDURE DATE:  2022          INTERPRETATION:  CLINICAL INFORMATION: 87 years  Male with sepsis.   History of metastatic prostate cancer.    COMPARISON: Contrast-enhanced CT2022    CONTRAST/COMPLICATIONS:  IV Contrast: NONE  Oral Contrast: NONE  Complications: None reported at time of study completion    PROCEDURE:  CT of the Abdomen and Pelvis was performed.  Sagittal and coronal reformats were performed.    LIMITATIONS: Evaluation of the solid organs, vascular structures and GI   tract is limited without oral and IV contrast. Motion artifact. Streak   artifact from patient's arms.    FINDINGS:  LOWER CHEST: Coronary artery calcifications and/or stents.    LIVER: Within normal limits.  BILE DUCTS: Normal caliber.  GALLBLADDER: Cholelithiasis.  SPLEEN: Within normal limits.  PANCREAS: Within normal limits.  ADRENALS: Stable small adrenal nodules.  KIDNEYS/URETERS: Right lower pole cyst. Bilateral hypodensities and   hyperdensities too small to characterize.    BLADDER: Within normal limits.  REPRODUCTIVE ORGANS: Atrophic calcified prostate versus prostatectomy.    BOWEL: No bowel obstruction. Appendix is not visualized. No evidence of   inflammation in the pericecal region.  PERITONEUM: No ascites.  VESSELS: Atherosclerotic changes.  RETROPERITONEUM/LYMPH NODES: No lymphadenopathy.  ABDOMINAL WALL: Within normal limits.  BONES: Increasing diffuse sclerotic foci throughout the spine, pelvis and   proximal femurs, likely metastatic. Degenerative changes.    IMPRESSION:  Cholelithiasis.    Increasing diffuse sclerotic lesions, likely metastatic.    < end of copied text >      Radiology: all available radiological tests reviewed    Advanced directives addressed: full resuscitation
87y Male, RHD, extensive past medical history, HTN, HLD, Afib (Eliquis), CHF, metastatic prostate cancer, anemia, recent septic right shoulder/AC Joint s/p I&D (5/15) with Dr. Aguirre with completion of 6 weeks Ancef via PICC, now readmitted for AMS, concern for underlying sepsis from re-infection. Patient able to answer questions appropriately responds to commands. States no active right shoulder pain with continued improvement following his recent surgery. Denies fevers or chills. No difficulty with shoulder ROM from baseline. Denies interval trauma. Denies pain/injury elsewhere. No other complaints.    HEALTH ISSUES - PROBLEM Dx:  Septic encephalopathy    Afib    HTN (hypertension)    HLD (hyperlipidemia)    Prostate cancer    Chronic diastolic heart failure          MEDICATIONS  (STANDING):  apixaban 5 milliGRAM(s) Oral every 12 hours  atorvastatin 10 milliGRAM(s) Oral at bedtime  cefepime  Injectable. 1000 milliGRAM(s) IV Push every 12 hours  dofetilide 250 MICROGram(s) Oral two times a day  folic acid 1 milliGRAM(s) Oral daily  furosemide    Tablet 20 milliGRAM(s) Oral two times a day  lactated ringers. 1000 milliLiter(s) IV Continuous <Continuous>  timolol 0.25% Solution 1 Drop(s) Both EYES daily    Allergies    No Known Allergies    Intolerances                            10.5   7.47  )-----------( 156      ( 01 Jul 2022 09:02 )             34.3     01 Jul 2022 09:02    141    |  99     |  21     ----------------------------<  85     3.7     |  35     |  0.84     Ca    8.4        01 Jul 2022 09:02  Mg     1.9       01 Jul 2022 09:02    TPro  5.9    /  Alb  2.3    /  TBili  0.7    /  DBili  x      /  AST  46     /  ALT  <6     /  AlkPhos  347    01 Jul 2022 09:02    PT/INR - ( 30 Jun 2022 10:50 )   PT: 33.0 sec;   INR: 2.82 ratio         PTT - ( 30 Jun 2022 10:50 )  PTT:35.5 sec  Vital Signs Last 24 Hrs  T(C): 36.7 (07-01-22 @ 07:49), Max: 37 (06-30-22 @ 23:20)  T(F): 98.1 (07-01-22 @ 07:49), Max: 98.6 (06-30-22 @ 23:20)  HR: 84 (07-01-22 @ 07:49) (64 - 84)  BP: 100/54 (07-01-22 @ 07:49) (100/54 - 126/53)  BP(mean): 79 (06-30-22 @ 15:34) (79 - 80)  RR: 16 (07-01-22 @ 07:49) (15 - 17)  SpO2: 97% (07-01-22 @ 07:49) (96% - 100%)      Physical Exam  Gen: NAD, Alert and Awake, Follows Commands  RUE:   Skin intact, recent arthroscopic incisions well healed without drainage  No TTP   No effusion or swelling noted   No appreciable erythema or warmth compared to c/l shoulder   Tolerates painless active and passive ROM about the GH and AC Joint   No elbow or remaining UE pain   R/u/m/ain/pin function intact.   SILT over deltoid.   SILT digits 1-5, warm to touch.   + radial pulse.   Compartments soft and compressible.     Imaging: XR pending     A/P: 87y Male with recent septic right shoulder/AC Joint s/p I&D 5/15, now readmitted for AMS, concern for underlying sepsis and re-infection     Low suspicion for right shoulder re-infection. Reassuring examination, defer joint aspiration at this time   Multimodal analgesia   Follow blood cultures  Trend ESR/CRP  Recent completion of 6 weeks Ancef via PICC, PICC discontinued, currently on cefepime   Appreciate ID recs  WBAT/PT/OT  Needs right shoulder XR, defer advanced imaging at this point   Medical mgmt per primary team  Discussed with patient. All question answered  Discussed with Dr. Aguirre who is aware and agrees with plan. Patient may follow-up in office upon hospital discharge. Call to schedule   Orthopedically stable for d/c following XR
HPI: Pt is a 87y old Male with hx of Anemia, HLD, Afib, R Renal lesion, diverticulitis, prostate ca with mets to bone, bilateral lower extremity cellulitis, and recent discharge for septic arthritis dc'd on Ancef with right PICC line.  He is sent from [facility] due to acute change in mental status.  Per the patient's wife he is usually AAOx4 and very talkative.  Today he is lethargic, not responding appropriately to questions and ill appearing.  Per records there is no sob, cough, chest pain, palpitations, nausea, vomiting, leg swelling, orthopnea, headache, abd pain, diarrhea, constipation, rash, paresthesias, changes in vision/hearing/speech/gait.  Palliative medicine consulted to help establish GOC and advance care planning     2022 patient seen and examined with no family at  bedside, patient remains confused stating that he is in [place] was unable to describe his medical history. Patient appears comfortable at this time       PAIN: ( )Yes   (x )No  patient appears comfortable     DYSPNEA: ( ) Yes  ( x) No  Level:    PAST MEDICAL & SURGICAL HISTORY:  Afib  HTN (hypertension)  HLD (hyperlipidemia)  Prostate cancer  PVD (peripheral vascular disease)  History of appendectomy  History of prostate cancer    SOCIAL HX:     Hx opiate tolerance ( )YES  (x )NO    Baseline ADLs  (Prior to Admission)  ( ) Independent   (x )Dependent    FAMILY HISTORY:  Patient&#x27;s mother is  (Mother)    Patient&#x27;s father is  (Father)    Review of Systems:      Unable to obtain/Limited due to: confusion       PHYSICAL EXAM:    Vital Signs Last 24 Hrs  T(C): 36.6 (2022 08:23), Max: 36.6 (2022 15:32)  T(F): 97.9 (2022 08:23), Max: 97.9 (2022 15:32)  HR: 71 (2022 08:23) (58 - 71)  BP: 124/61 (2022 08:23) (98/45 - 124/61)  RR: 18 (2022 08:23) (16 - 18)  SpO2: 99% (2022 08:23) (91% - 100%)    PPSV2: 30  %  FAST: unsure of baseline     General: elderly gentleman in bed, NAD   Mental Status: alert but disoriented   HEENT: dry oral mucosa, + temporal wasting   Lungs: diminished breath sounds   Cardiac: S1S2 +   GI: non tender, non distended, +BS   : + voiding   Ext: no edema   Neuro: confusion       LABS:                        10.8   6.32  )-----------( 204      ( 2022 09:18 )             35.1       Albumin: Albumin, Serum: 2.3 g/dL ( @ 09:02)    Allergies    No Known Allergies    Intolerances      MEDICATIONS  (STANDING):  ammonium lactate 12% Lotion 1 Application(s) Topical two times a day  apixaban 5 milliGRAM(s) Oral every 12 hours  atorvastatin 10 milliGRAM(s) Oral at bedtime  dofetilide 250 MICROGram(s) Oral two times a day  folic acid 1 milliGRAM(s) Oral daily  furosemide    Tablet 20 milliGRAM(s) Oral two times a day  lactated ringers. 1000 milliLiter(s) (75 mL/Hr) IV Continuous <Continuous>  piperacillin/tazobactam IVPB.. 3.375 Gram(s) IV Intermittent every 8 hours  timolol 0.25% Solution 1 Drop(s) Both EYES daily    MEDICATIONS  (PRN):  acetaminophen     Tablet .. 650 milliGRAM(s) Oral every 6 hours PRN Temp greater or equal to 38C (100.4F), Mild Pain (1 - 3)  ALBUTerol    90 MICROgram(s) HFA Inhaler 1 Puff(s) Inhalation every 6 hours PRN Shortness of Breath and/or Wheezing  aluminum hydroxide/magnesium hydroxide/simethicone Suspension 30 milliLiter(s) Oral every 4 hours PRN Dyspepsia  LORazepam     Tablet 0.25 milliGRAM(s) Oral once PRN Agitation  melatonin 3 milliGRAM(s) Oral at bedtime PRN Insomnia      RADIOLOGY/ADDITIONAL STUDIES:    ACC: [id number] EXAM:  CT ABDOMEN AND PELVIS                        PROCEDURE DATE:  2022    INTERPRETATION:  CLINICAL INFORMATION: 87 years  Male with sepsis.   History of metastatic prostate cancer.  COMPARISON: Contrast-enhanced CT2022  CONTRAST/COMPLICATIONS:  IV Contrast: NONE  Oral Contrast: NONE  Complications: None reported at time of study completion  PROCEDURE:  CT of the Abdomen and Pelvis was performed.  Sagittal and coronal reformats were performed.  LIMITATIONS: Evaluation of the solid organs, vascular structures and GI   tract is limited without oral and IV contrast. Motion artifact. Streak   artifact from patient's arms.    FINDINGS:  LOWER CHEST: Coronary artery calcifications and/or stents.  LIVER: Within normal limits.  BILE DUCTS: Normal caliber.  GALLBLADDER: Cholelithiasis.  SPLEEN: Within normal limits.  PANCREAS: Within normal limits.  ADRENALS: Stable small adrenal nodules.  KIDNEYS/URETERS: Right lower pole cyst. Bilateral hypodensities and   hyperdensities too small to characterize.  BLADDER: Within normal limits.  REPRODUCTIVE ORGANS: Atrophic calcified prostate versus prostatectomy.  BOWEL: No bowel obstruction. Appendix is not visualized. No evidence of   inflammation in the pericecal region.  PERITONEUM: No ascites.  VESSELS: Atherosclerotic changes.  RETROPERITONEUM/LYMPH NODES: No lymphadenopathy.  ABDOMINAL WALL: Within normal limits.  BONES: Increasing diffuse sclerotic foci throughout the spine, pelvis and   proximal femurs, likely metastatic. Degenerative changes.    IMPRESSION:  Cholelithiasis.  Increasing diffuse sclerotic lesions, likely metastatic.      ACC: [id number] EXAM:  CT BRAIN                        PROCEDURE DATE:  2022          INTERPRETATION:  CT HEAD    CLINICAL HISTORY: Mental status change, unknown cause    TECHNIQUE:  Noncontrast CT.  Axial Acquisition.  Sagittal and coronal reformations.    COMPARISON:  Compared to study dated 2/15/2022    FINDINGS:  *  HEMORRHAGE:  No evidence of intracranial hemorrhage.  *  BRAIN PARENCHYMA:  Mild to moderate atrophy. Mild patchy white matter   ischemic changes. No mass or mass effect.  *  VENTRICLES / SHIFT:  No hydrocephalus. No midline shift.  *  EXTRA-AXIAL / BASAL CISTERNS:  No extra-axial mass. Basal cisterns   preserved.  Atherosclerotic calcifications of the cavernous internal   carotid arteries.  *  CALVARIUM AND EXTRACRANIAL SOFT TISSUES:  No depressed calvarial   fracture.  *  SINUSES, ORBITS, MASTOIDS:  The visualized paranasal sinuses and   mastoid air cells are well aerated.    IMPRESSION:  NO EVIDENCE OF ACUTE INTRACRANIAL HEMORRHAGE OR HYDROCEPHALUS.  ATROPHY   WITH WHITE MATTER ISCHEMIC CHANGES.      ACC: [id number] EXAM:  XR CHEST PORTABLE URGENT 1V                        PROCEDURE DATE:  2022    INTERPRETATION:  Portable chest radiograph  CLINICAL INFORMATION: Sepsis  TECHNIQUE:  Portable  AP chest radiograph.  COMPARISON: 2022 chest radiograph .  FINDINGS:  CATHETERS AND TUBES: PICC line catheter tip at SVC brachycephalic   junction.  PULMONARY: The visualized lungs are clear of airspace consolidations or   effusions.  No pneumothorax.  HEART/VASCULAR: The heart and mediastinum size and configuration are   within normal limits.  BONES: Visualized osseous structures are intact.    IMPRESSION:   No radiographic evidence of active chest disease.

## 2022-07-05 NOTE — PROGRESS NOTE ADULT - ASSESSMENT
Caller would like to discuss an/a Return call for. Writer advised caller of callback within 24-72 hours.    Patient Name: Star HIGGINS Claire  Caller Name: Star  Name of Facility: [facility]  Callback Number: [phone]  Best Availability: any  Fax Number: na  Additional Info:   Did you confirm the message with the caller?: yes    Thank you,  Florencia Palacios  
Pt is an 88 yo male with a PMHx of Anemia, HLD, Afib, R Renal lesion, diverticulitis, prostate ca with mets to bone, bilateral lower extremity cellulitis, and recent discharge for septic arthritis dc'd on Ancef with right PICC line.  He is sent from [facility] due to acute change in mental status, lethargy. Pt found to have UTI with urine culture growing pseudomonas, on IV zosyn with improvement    #Encephalopathy likely d/t UTI   - UA positive, UCx 6/30 growing Pseudomonas   - BCx negative, CXR/CT head/ CT A/P negative for acute pathology   - Continue IV zosyn, transition to PO cipro on discharge   - PICC line removed  - also obtain Ortho consult and follow up.-> apprec reccs-> no concern for recurrent septic arthritis at this time  - metabolic encephalopathy resolved. Made wife Pat aware-> wife wanted patient to go home but is too weak requiring max assist, wife now in agreement with [facility] placement     #Prostate cancer with bone mets   - hold CA meds in setting of acute infection.  Resume on d/c  - onc f/u outpatient  - worsening metastatic bone lesions noted on CT A/P as above   - palliative care consult for GOC     #Afib  - c/w Apixaban    #Chronic diastolic heart failure  - monitor for signs of overload after 3000 ml LR, breathing comfortably on RA   - resume Lasix 20 mg BID    #Stage 2 pressure injury to sarcrum  #Unstageable pressure injury to R. ischium   - present on admission   - wound care team consult appreciate   - wound care recs:   1)When patient is out of bed to chair to maintain Air Waffle cushion   2)Maintain Low Air Loss Centrella mattress for pressure redistribution  3)Lac-Hydrin to Lower extremities for dry scaling skin  4)Keep Heels elevated off mattress when in bed  5) Sacrum apply Triad Cream   6) Right Ischium apply traid cream two times per day  7) Use 1 purple pad under patient     #HLD  - Atorvastatin.    #HTN   - resume home medications    #DVT ppx   - Eliquis 5mg BID    #Diet   - Regular     #Code  - Full    #Dispo  - Pending discharge to [facility]
86 y/o male with altered mental status    
88 y/o male with altered mental status

## 2022-07-05 NOTE — PROGRESS NOTE ADULT - SUBJECTIVE AND OBJECTIVE BOX
Chief Complaint: altered mental status, lethargy      Interval events:   - No acute events overnight, VSS, pt afebrile   - Pt denies any current complaints, remains pleasantly confused     ROS:   Patient denies any current chest pain, SOB, cough, f/c/n/v/d, abd pain, LE edema, myalgias, dysuria, HA, dizziness  All other review of systems is negative unless indicated above    Physical Exam:  Vital Signs Last 24 Hrs  T(C): 36.6 (05 Jul 2022 08:23), Max: 36.6 (04 Jul 2022 15:32)  T(F): 97.9 (05 Jul 2022 08:23), Max: 97.9 (04 Jul 2022 15:32)  HR: 71 (05 Jul 2022 08:23) (58 - 71)  BP: 124/61 (05 Jul 2022 08:23) (98/45 - 124/61)  BP(mean): --  RR: 18 (05 Jul 2022 08:23) (16 - 18)  SpO2: 99% (05 Jul 2022 08:23) (91% - 100%)    Constitutional: NAD, awake and alert, chronically ill appearing   HEENT: PERRLA, EOMI, MMM  Respiratory: Breath sounds are clear bilaterally, No wheezing, rales or rhonchi  Cardiovascular: S1 and S2, RRR, no murmurs, gallops or rubs  Gastrointestinal: +BS, soft, non-tender, non-distended, no CVA tenderness  Extremities: No peripheral edema, +DP pulses b/l  Neurological: A&O x 1-2, no focal deficits  Musculoskeletal: 5/5 strength b/l upper and lower extremities  Skin: +Stage 2 sacrum pressure ulcer, unstageable pressure injury R. ischium     Labs:  No labs past 24 hrs    Micro:  UCx 6/30 -- Pseudomonas   BCx 6/30 -- NGTD    Radiology:  CT Abdomen and Pelvis No Cont (06.30.22)  IMPRESSION:  Cholelithiasis.    Increasing diffuse sclerotic lesions, likely metastatic.      CT Head No Cont (06.30.22)  IMPRESSION:  NO EVIDENCE OF ACUTE INTRACRANIAL HEMORRHAGE OR HYDROCEPHALUS.  ATROPHY   WITH WHITE MATTER ISCHEMIC CHANGES.    Medications:  MEDICATIONS  (STANDING):  ammonium lactate 12% Lotion 1 Application(s) Topical two times a day  apixaban 5 milliGRAM(s) Oral every 12 hours  atorvastatin 10 milliGRAM(s) Oral at bedtime  dofetilide 250 MICROGram(s) Oral two times a day  folic acid 1 milliGRAM(s) Oral daily  furosemide    Tablet 20 milliGRAM(s) Oral two times a day  lactated ringers. 1000 milliLiter(s) (75 mL/Hr) IV Continuous <Continuous>  piperacillin/tazobactam IVPB.. 3.375 Gram(s) IV Intermittent every 8 hours  timolol 0.25% Solution 1 Drop(s) Both EYES daily    MEDICATIONS  (PRN):  acetaminophen     Tablet .. 650 milliGRAM(s) Oral every 6 hours PRN Temp greater or equal to 38C (100.4F), Mild Pain (1 - 3)  ALBUTerol    90 MICROgram(s) HFA Inhaler 1 Puff(s) Inhalation every 6 hours PRN Shortness of Breath and/or Wheezing  aluminum hydroxide/magnesium hydroxide/simethicone Suspension 30 milliLiter(s) Oral every 4 hours PRN Dyspepsia  LORazepam     Tablet 0.25 milliGRAM(s) Oral once PRN Agitation  melatonin 3 milliGRAM(s) Oral at bedtime PRN Insomnia

## 2022-07-05 NOTE — ADVANCED PRACTICE NURSE CONSULT - RECOMMEDATIONS
1)When patient is out of bed to chair to maintain Air Waffle cushion   2)Maintain Low Air Loss Centrella mattress for pressure redistribution  3)Lac-Hydrin to Lower extremities for dry scaling skin  4)Keep Heels elevated off mattress when in bed  5) Sacrum apply Triad Cream   6) Right Ischium apply traid cream two times per day  7) Use 1 purple pad under patient

## 2022-07-05 NOTE — CDI QUERY NOTE - NSCDIOTHERTXTBX2_GEN_ALL_CORE_FT
This patient is documented to have a fib:    HP Adult 6-30-22 @ 17:23  Pt is an 86 yo male with a pmh/o Anemia, HLD, Afib    Problem/Plan - 5:  ·  Problem: Afib.   ·  Plan: - c/w Apixaban.    Progress Note Adult-Hospitalist Attending 7-4-22 @ 12:42  Problem/Plan - 5:  ·  Problem: Afib.   ·  Plan: - c/w Apixaban.    12 Lead ECG (06.30.22 @ 10:22)  Diagnosis Line Atrial fibrillation  Septal infarct (cited on or before 12-MAY-2022)    Can the type of atrial fibrillation be specified?  A) Chronic Atrial Fibrillation.  B) Permanent Atrial Fibrillation.  C) Other, please specify:  D) Unable to determine.

## 2022-07-06 ENCOUNTER — TRANSCRIPTION ENCOUNTER (OUTPATIENT)
Age: 87
End: 2022-07-06

## 2022-07-06 VITALS
DIASTOLIC BLOOD PRESSURE: 43 MMHG | RESPIRATION RATE: 18 BRPM | OXYGEN SATURATION: 95 % | TEMPERATURE: 98 F | HEART RATE: 64 BPM | SYSTOLIC BLOOD PRESSURE: 104 MMHG

## 2022-07-06 PROCEDURE — 99239 HOSP IP/OBS DSCHRG MGMT >30: CPT

## 2022-07-06 RX ADMIN — PIPERACILLIN AND TAZOBACTAM 25 GRAM(S): 4; .5 INJECTION, POWDER, LYOPHILIZED, FOR SOLUTION INTRAVENOUS at 05:20

## 2022-07-06 RX ADMIN — Medication 1 DROP(S): at 09:42

## 2022-07-06 RX ADMIN — Medication 1 MILLIGRAM(S): at 09:41

## 2022-07-06 RX ADMIN — APIXABAN 5 MILLIGRAM(S): 2.5 TABLET, FILM COATED ORAL at 09:42

## 2022-07-06 RX ADMIN — PIPERACILLIN AND TAZOBACTAM 25 GRAM(S): 4; .5 INJECTION, POWDER, LYOPHILIZED, FOR SOLUTION INTRAVENOUS at 14:25

## 2022-07-06 RX ADMIN — DOFETILIDE 250 MICROGRAM(S): 0.25 CAPSULE ORAL at 05:20

## 2022-07-06 RX ADMIN — Medication 20 MILLIGRAM(S): at 16:47

## 2022-07-06 RX ADMIN — Medication 1 APPLICATION(S): at 09:42

## 2022-07-06 RX ADMIN — DOFETILIDE 250 MICROGRAM(S): 0.25 CAPSULE ORAL at 16:47

## 2022-07-06 RX ADMIN — Medication 20 MILLIGRAM(S): at 05:19

## 2022-07-06 NOTE — DISCHARGE NOTE PROVIDER - CARE PROVIDER_API CALL
Nando Diego)  Internal Medicine  [facility]-Department of Medicine, [address] [place] [address]  Phone: [phone]  Fax: [phone]  Established Patient  Follow Up Time: 2 weeks

## 2022-07-06 NOTE — DISCHARGE NOTE PROVIDER - HOSPITAL COURSE
Hospital Discharge Summary       Physical Exam:  Vital Signs Last 24 Hrs  T(C): 36.3 (06 Jul 2022 08:09), Max: 36.4 (05 Jul 2022 15:20)  T(F): 97.4 (06 Jul 2022 08:09), Max: 97.6 (05 Jul 2022 15:20)  HR: 72 (06 Jul 2022 08:09) (62 - 72)  BP: 125/62 (06 Jul 2022 08:09) (108/54 - 156/69)  BP(mean): --  RR: 18 (06 Jul 2022 08:09) (18 - 18)  SpO2: 92% (06 Jul 2022 08:09) (92% - 98%)    Constitutional: NAD, awake and alert, chronically ill appearing   HEENT: PERRLA, EOMI, MMM  Respiratory: Breath sounds are clear bilaterally, No wheezing, rales or rhonchi  Cardiovascular: S1 and S2, RRR, no murmurs, gallops or rubs  Gastrointestinal: +BS, soft, non-tender, non-distended, no CVA tenderness  Extremities: No peripheral edema, +DP pulses b/l  Neurological: A&O x 2, no focal deficits  Musculoskeletal: 5/5 strength b/l upper and lower extremities  Skin: +Stage 2 sacrum pressure ulcer, unstageable pressure injury R. ischium       Assessment/Plan:  Pt is an 86 yo male with a PMHx of Anemia, HLD, Afib, R Renal lesion, diverticulitis, prostate ca with mets to bone, bilateral lower extremity cellulitis, and recent discharge for septic arthritis dc'd on Ancef with right PICC line.  He is sent from [facility] due to acute change in mental status, lethargy. Pt found to have UTI with urine culture growing pseudomonas, given IV zosyn with improvement    #Metabolic Encephalopathy likely d/t UTI   - UA positive, UCx 6/30 growing Pseudomonas   - BCx negative, CXR/CT head/ CT A/P negative for acute pathology   - s/p IV zosyn (7/2 - 7/6), transition to PO cipro on discharge to complete 7 days total abx   - PICC line removed  - Ortho consulted and follow up.-> appreciate recs-> no concern for recurrent septic arthritis at this time  - metabolic encephalopathy resolved. Made wife Pat aware-> wife wanted patient to go home but pt is too weak requiring max assist, wife now in agreement with CACHORRO placement\    #Prostate cancer with bone mets   - hold CA meds in setting of acute infection.  Resume on d/c  - onc f/u outpatient  - worsening metastatic bone lesions noted on CT A/P as above   - palliative care consulted for 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 ischium   - present on admission   - wound care team consult appreciated   - wound care recs:   1)When patient is out of bed to chair to maintain Air Waffle cushion   2)Maintain Low Air Loss Centrella mattress for pressure redistribution  3)Lac-Hydrin to Lower extremities for dry scaling skin  4)Keep Heels elevated off mattress when in bed  5) Sacrum apply Triad Cream   6) Right Ischium apply traid cream two times per day  7) Use 1 purple pad under patient     #HLD  - Atorvastatin.    #HTN   - home medications    #Dispo: discharge to [facility] in stable condition    Final diagnosis, treatment plan, and follow-up recommendations were discussed and explained to the patient. The patient was given an opportunity to ask questions concerning the diagnosis and treatment plan. The patient acknowledged understanding of the diagnosis, treatment, and follow-up recommendations. The patient was advised to seek urgent care upon discharge if worsening symptoms develop prior to scheduled follow-up. Time spent on discharge included time with the patient, and also coordinating discharge care as outlined below.    Total time spent: 50 min   Hospital Discharge Summary       Physical Exam:  Vital Signs Last 24 Hrs  T(C): 36.3 (06 Jul 2022 08:09), Max: 36.4 (05 Jul 2022 15:20)  T(F): 97.4 (06 Jul 2022 08:09), Max: 97.6 (05 Jul 2022 15:20)  HR: 72 (06 Jul 2022 08:09) (62 - 72)  BP: 125/62 (06 Jul 2022 08:09) (108/54 - 156/69)  BP(mean): --  RR: 18 (06 Jul 2022 08:09) (18 - 18)  SpO2: 92% (06 Jul 2022 08:09) (92% - 98%)    Constitutional: NAD, awake and alert, chronically ill appearing   HEENT: PERRLA, EOMI, MMM  Respiratory: Breath sounds are clear bilaterally, No wheezing, rales or rhonchi  Cardiovascular: S1 and S2, RRR, no murmurs, gallops or rubs  Gastrointestinal: +BS, soft, non-tender, non-distended, no CVA tenderness  Extremities: No peripheral edema, +DP pulses b/l  Neurological: A&O x 2, no focal deficits  Musculoskeletal: 5/5 strength b/l upper and lower extremities  Skin: +Stage 2 sacrum pressure ulcer, unstageable pressure injury R. ischium       Assessment/Plan:  Pt is an 86 yo male with a PMHx of Anemia, HLD, Afib, R Renal lesion, diverticulitis, prostate ca with mets to bone, bilateral lower extremity cellulitis, and recent discharge for septic arthritis dc'd on Ancef with right PICC line.  He is sent from [facility] due to acute change in mental status, lethargy. Pt found to have UTI with urine culture growing pseudomonas, given IV zosyn with improvement    #Metabolic Encephalopathy likely d/t UTI   - UA positive, UCx 6/30 growing Pseudomonas   - BCx negative, CXR/CT head/ CT A/P negative for acute pathology   - s/p IV zosyn (7/2 - 7/6), transition to PO cipro on discharge to complete 7 days total abx ; 3 more days  - PICC line removed  - Ortho consulted and follow up.-> appreciate recs-> no concern for recurrent septic arthritis at this time  - metabolic encephalopathy resolved. Made wife Pat aware-> wife wanted patient to go home but pt is too weak requiring max assist, wife now in agreement with CACHORRO placement\    #Prostate cancer with bone mets   - hold CA meds in setting of acute infection.  Resume on d/c  - onc f/u outpatient  - worsening metastatic bone lesions noted on CT A/P as above   - palliative care consulted for [facility]     #Afib  - c/w Apixaban    #Chronic diastolic heart failure  - breathing comfortably on RA   - continue home Lasix 20 mg BID    #Stage 2 pressure injury to sarcrum  #Unstageable pressure injury to R. ischium   - present on admission   - wound care team consult appreciated   - wound care recs:   1)When patient is out of bed to chair to maintain Air Waffle cushion   2)Maintain Low Air Loss Centrella mattress for pressure redistribution  3)Lac-Hydrin to Lower extremities for dry scaling skin  4)Keep Heels elevated off mattress when in bed  5) Sacrum apply Triad Cream   6) Right Ischium apply traid cream two times per day  7) Use 1 purple pad under patient     #HLD  - Atorvastatin.    #HTN   - home medications    #Dispo: discharge to [facility] in stable condition    Final diagnosis, treatment plan, and follow-up recommendations were discussed and explained to the patient. The patient was given an opportunity to ask questions concerning the diagnosis and treatment plan. The patient acknowledged understanding of the diagnosis, treatment, and follow-up recommendations. The patient was advised to seek urgent care upon discharge if worsening symptoms develop prior to scheduled follow-up. Time spent on discharge included time with the patient, and also coordinating discharge care as outlined below.    Patient seen and examined with the PA. Agree with above plan of care which was discussed with the patient, family, team and PA.     Total time spent: 50 min

## 2022-07-06 NOTE — DISCHARGE NOTE NURSING/CASE MANAGEMENT/SOCIAL WORK - PATIENT PORTAL LINK FT
You can access the FollowMyHealth Patient Portal offered by [facility] by registering at the following website: [email or web address]. By joining [facility]’s FollowMyHealth portal, you will also be able to view your health information using other applications (apps) compatible with our system.

## 2022-07-06 NOTE — DISCHARGE NOTE NURSING/CASE MANAGEMENT/SOCIAL WORK - NSDCPEFALRISK_GEN_ALL_CORE
For information on Fall & Injury Prevention, visit: [email or web address] OR  [email or web address] OR  https://www.cdc.gov/steadi/patient.html

## 2022-07-06 NOTE — DISCHARGE NOTE PROVIDER - NSDCCPCAREPLAN_GEN_ALL_CORE_FT
PRINCIPAL DISCHARGE DIAGNOSIS  Diagnosis: Acute UTI  Assessment and Plan of Treatment: WHAT IS A URINARY TRACT INFECTION? A urinary tract infection (UTI) is caused by bacteria that gets inside your urinary tract. You urinary tract includes your kidneys, ureters, and bladder.  THINGS TO DO: (1) Urinate when you feel the urge – do not hold your urine (2) Wear cotton underwear to prevent the trapping of moisture (3) Women should wipe front to back to prevent the translocation of germs  MONITOR THESE SIGNS AND SYMPTOMS: (1) Urinating very little or not at all (2) Vomiting (3) Fever > 100.4. If you experience any of these, DO alert your primary care provider, or return to the Emergency Department if you feel very sick.   Continue Cirpofloxacin 250mg twice a day, x 3 days (7/7 - 7/9) to complete 7 days total of antibiotics      SECONDARY DISCHARGE DIAGNOSES  Diagnosis: Other encephalopathy  Assessment and Plan of Treatment:

## 2022-07-06 NOTE — DISCHARGE NOTE PROVIDER - NSDCMRMEDTOKEN_GEN_ALL_CORE_FT
acetaminophen 500 mg oral tablet: 2 tab(s) orally every 4 hours, As Needed  Albuterol (Eqv-Proventil HFA) 90 mcg/inh inhalation aerosol: 1 puff(s) inhaled every 6 hours, As Needed  ammonium lactate 12% topical lotion: Apply topically to affected area 2 times a day  apixaban 5 mg oral tablet: 1 tab(s) orally 2 times a day  Aspercreme with Lidocaine 4% topical film: Apply topically to affected area once a day  atorvastatin 10 mg oral tablet: 1 tab(s) orally once a day (at bedtime)  Cipro 250 mg oral tablet: 1 tab(s) orally 2 times a day   dofetilide 250 mcg oral capsule: 1 cap(s) orally 2 times a day  escitalopram 5 mg oral tablet: 1 tab(s) orally once a day  folic acid 1 mg oral tablet: 1 tab(s) orally once a day  furosemide 20 mg oral tablet: 1 tab(s) orally 2 times a day  Melatonin 3 mg oral tablet: 1 tab(s) orally once a day (at bedtime)  Normal Saline Flush 0.9% injectable solution: ***Use 10 cc intravenously every shift for maintain patency***  oxyCODONE 5 mg oral tablet: 1 tab(s) orally every 4 hours, As Needed - ***for moderate to severe pain***  potassium chloride 20 mEq oral tablet, extended release: 2 tab(s) orally once a day  timolol maleate 0.25% ophthalmic solution: 1 drop(s) in each eye once a day

## 2022-07-06 NOTE — DISCHARGE NOTE PROVIDER - NSDCCAREPROVSEEN_GEN_ALL_CORE_FT
Zayda Stringer (Infectious disease MD)  Silva Velázquez (Hospitalist)  Divine Armijo (Hospitalist PA)

## 2022-07-07 RX ORDER — CIPROFLOXACIN LACTATE 400MG/40ML
1 VIAL (ML) INTRAVENOUS
Qty: 6 | Refills: 0
Start: 2022-07-07 | End: 2022-07-09

## 2022-07-18 DIAGNOSIS — L89.152 PRESSURE ULCER OF SACRAL REGION, STAGE 2: ICD-10-CM

## 2022-07-18 DIAGNOSIS — Z90.79 ACQUIRED ABSENCE OF OTHER GENITAL ORGAN(S): ICD-10-CM

## 2022-07-18 DIAGNOSIS — I73.9 PERIPHERAL VASCULAR DISEASE, UNSPECIFIED: ICD-10-CM

## 2022-07-18 DIAGNOSIS — I48.20 CHRONIC ATRIAL FIBRILLATION, UNSPECIFIED: ICD-10-CM

## 2022-07-18 DIAGNOSIS — I50.32 CHRONIC DIASTOLIC (CONGESTIVE) HEART FAILURE: ICD-10-CM

## 2022-07-18 DIAGNOSIS — N39.0 URINARY TRACT INFECTION, SITE NOT SPECIFIED: ICD-10-CM

## 2022-07-18 DIAGNOSIS — B96.5 PSEUDOMONAS (AERUGINOSA) (MALLEI) (PSEUDOMALLEI) AS THE CAUSE OF DISEASES CLASSIFIED ELSEWHERE: ICD-10-CM

## 2022-07-18 DIAGNOSIS — L89.210 PRESSURE ULCER OF RIGHT HIP, UNSTAGEABLE: ICD-10-CM

## 2022-07-18 DIAGNOSIS — E78.5 HYPERLIPIDEMIA, UNSPECIFIED: ICD-10-CM

## 2022-07-18 DIAGNOSIS — F03.90 UNSPECIFIED DEMENTIA WITHOUT BEHAVIORAL DISTURBANCE: ICD-10-CM

## 2022-07-18 DIAGNOSIS — C61 MALIGNANT NEOPLASM OF PROSTATE: ICD-10-CM

## 2022-07-18 DIAGNOSIS — J45.909 UNSPECIFIED ASTHMA, UNCOMPLICATED: ICD-10-CM

## 2022-07-18 DIAGNOSIS — G93.41 METABOLIC ENCEPHALOPATHY: ICD-10-CM

## 2022-07-18 DIAGNOSIS — Z79.899 OTHER LONG TERM (CURRENT) DRUG THERAPY: ICD-10-CM

## 2022-07-18 DIAGNOSIS — Z90.49 ACQUIRED ABSENCE OF OTHER SPECIFIED PARTS OF DIGESTIVE TRACT: ICD-10-CM

## 2022-07-18 DIAGNOSIS — Z79.01 LONG TERM (CURRENT) USE OF ANTICOAGULANTS: ICD-10-CM

## 2022-07-18 DIAGNOSIS — I11.0 HYPERTENSIVE HEART DISEASE WITH HEART FAILURE: ICD-10-CM

## 2022-07-18 DIAGNOSIS — E43 UNSPECIFIED SEVERE PROTEIN-CALORIE MALNUTRITION: ICD-10-CM

## 2022-07-18 DIAGNOSIS — C79.51 SECONDARY MALIGNANT NEOPLASM OF BONE: ICD-10-CM

## 2022-09-28 NOTE — PROGRESS NOTE ADULT - PROVIDER SPECIALTY LIST ADULT
Family Medicine
Family Medicine
Orthopedics
Family Medicine
Family Medicine
Orthopedics
Orthopedics
no chills

## 2022-11-22 ENCOUNTER — NON-APPOINTMENT (OUTPATIENT)
Age: 87
End: 2022-11-22

## 2022-11-22 DIAGNOSIS — M25.462 EFFUSION, LEFT KNEE: ICD-10-CM

## 2022-11-22 DIAGNOSIS — Z85.9 PERSONAL HISTORY OF MALIGNANT NEOPLASM, UNSPECIFIED: ICD-10-CM

## 2022-11-22 DIAGNOSIS — Z87.438 PERSONAL HISTORY OF OTHER DISEASES OF MALE GENITAL ORGANS: ICD-10-CM

## 2022-11-22 DIAGNOSIS — M17.12 UNILATERAL PRIMARY OSTEOARTHRITIS, LEFT KNEE: ICD-10-CM

## 2022-11-22 DIAGNOSIS — M25.562 PAIN IN LEFT KNEE: ICD-10-CM

## 2022-11-22 RX ORDER — BICALUTAMIDE 50 MG/1
50 TABLET ORAL DAILY
Refills: 0 | Status: ACTIVE | COMMUNITY

## 2022-11-22 RX ORDER — ATORVASTATIN CALCIUM 10 MG/1
10 TABLET, FILM COATED ORAL DAILY
Refills: 0 | Status: ACTIVE | COMMUNITY

## 2022-11-22 RX ORDER — TIMOLOL MALEATE 5 MG/ML
0.5 SOLUTION OPHTHALMIC DAILY
Refills: 0 | Status: ACTIVE | COMMUNITY

## 2022-11-22 RX ORDER — FINASTERIDE 5 MG/1
5 TABLET, FILM COATED ORAL DAILY
Refills: 0 | Status: ACTIVE | COMMUNITY

## 2023-02-28 NOTE — H&P ADULT - NSVTERISKREFERASSESS_GEN_ALL_CORE
Quality 110: Preventive Care And Screening: Influenza Immunization: Influenza Immunization Administered during Influenza season Detail Level: Detailed Refer to the Assessment tab to view/cancel completed assessment.

## 2023-06-22 NOTE — DIETITIAN INITIAL EVALUATION ADULT. - PHYSCIAL ASSESSMENT
We are committed to providing you with the best care possible. In order to help us achieve these goals please remember to bring all medications, herbal products, and over the counter supplements with you to each visit. If your provider has ordered testing for you, please be sure to follow up with our office if you have not received results within 7 days after the testing took place. *If you receive a survey after visiting one of our offices, please take time to share your experience concerning your physician office visit. These surveys are confidential and no health information about you is shared. We are eager to improve for you and we are counting on your feedback to help make that happen. Cholo score 16  Last BM- 2/16: self reported underweight

## 2023-06-29 NOTE — PATIENT PROFILE ADULT. - PAIN SCALE PREFERRED, PROFILE
----- Message from Mayda Blair DO sent at 6/29/2023 12:59 PM EDT -----  Please let family know sickle cell test was negative  Thank you 
Spoke with mother aware of negative results will  results when paperwork completed
numerical 0-10

## 2023-08-15 NOTE — PHYSICAL THERAPY INITIAL EVALUATION ADULT - GENERAL OBSERVATIONS, REHAB EVAL
Pt rec'd supine in bed in NAD with VIPUL Jeffers present. Pt denied any back pain or discomfort at rest or with movement today
[FreeTextEntry1] : 73 year-old morbidly obese male seen in 2019 with complaints of atypical chest pain at that time.  Stress testing showed no evidence of ischemic heart disease.  He is being treated for hypertension and his blood pressures have been adequately controlled. Currently exercise tolerance limited by orthopedic issues and physical deconditioning. No prior history of coronary disease, diabetes or hyperlipidemia.  Underwent screening for sleep study by an ENT physician but results inconclusive and patient never went for followup.  He does have a history of snoring.  Recent chest CT shows heavily calcified coronary arteries, patient was empirically started on high dose Crestor. repeat CT continues to show heavy calcifications, gerard in LAD territory. Repeat stress testing did not show any evidence of ischemia in this distribution. His wife relates that he is having worsening dyspnea on exertion and occasional exertional left chest wall tightness.

## 2024-06-13 NOTE — DISCHARGE NOTE NURSING/CASE MANAGEMENT/SOCIAL WORK - NSDCPEELIQUISCOMP_GEN_ALL_CORE
2 Apixaban/Eliquis is used to treat and prevent blood clots. If you are not able to swallow the tablets whole, they may be crushed and mixed in water, apple juice, or applesauce and promptly taken within four hours. Never skip a dose of Apixaban/Eliquis. If you forget to take your Apixaban/Eliquis, take a dose as soon as you remember. If it is almost time for your next Apixaban/Eliquis dose, wait until then and take a regular dose. DO NOT take an extra pill to ‘catch up’.  NEVER TAKE A DOUBLE DOSE. Notify your doctor that you missed a dose. Take Apixaban/Eliquis at the same time each morning and evening. Apixaban/Eliquis may be taken with other medication or food.

## 2024-07-16 NOTE — ED ADULT TRIAGE NOTE - NURSING HOMES
[facility] [place] decreased ability to use arms for pushing/pulling/decreased ability to use legs for bridging/pushing

## 2024-08-14 NOTE — PATIENT PROFILE ADULT - FUNCTIONAL ASSESSMENT - DAILY ACTIVITY 2.
[facility]  Breast Center Contacts  To schedule breast imaging call Central Scheduling at [phone]                  Mammography: Option 1  Ultrasound: Option 2, then option 1  CPT Code for WBUS : 44074 & 55435.   MRI: Option 2, then Option 3  CPT Code: 22751  (at high risk, family history, dense breasts)       Genetic [phone]  [facility]: [phone]  Medical/Oncology: [phone]  Radiation/Oncology: [phone]     1 = Total assistance

## 2024-08-15 NOTE — ED PROVIDER NOTE - NSICDXPASTMEDICALHX_GEN_ALL_CORE_FT
Detail Level: Detailed Depth Of Biopsy: dermis Was A Bandage Applied: Yes Size Of Lesion In Cm: 0 Biopsy Type: H and E Biopsy Method: Dermablade Anesthesia Type: 1% lidocaine with epinephrine Anesthesia Volume In Cc: 0.5 Hemostasis: Drysol Wound Care: Petrolatum Dressing: bandage Destruction After The Procedure: No Type Of Destruction Used: Curettage Curettage Text: The wound bed was treated with curettage after the biopsy was performed. Cryotherapy Text: The wound bed was treated with cryotherapy after the biopsy was performed. Electrodesiccation Text: The wound bed was treated with electrodesiccation after the biopsy was performed. Electrodesiccation And Curettage Text: The wound bed was treated with electrodesiccation and curettage after the biopsy was performed. Silver Nitrate Text: The wound bed was treated with silver nitrate after the biopsy was performed. Lab: [id number] Lab Facility: [id number] Consent: Written consent was obtained and risks were reviewed including but not limited to scarring, infection, bleeding, scabbing, incomplete removal, nerve damage and allergy to anesthesia. Post-Care Instructions: I reviewed with the patient in detail post-care instructions. Patient is to keep the biopsy site dry overnight, and then apply bacitracin twice daily until healed. Patient may apply hydrogen peroxide soaks to remove any crusting. Notification Instructions: Patient will be notified of biopsy results. However, patient instructed to call the office if not contacted within 2 weeks. Billing Type: Third-Party Bill Information: Selecting Yes will display possible errors in your note based on the variables you have selected. This validation is only offered as a suggestion for you. PLEASE NOTE THAT THE VALIDATION TEXT WILL BE REMOVED WHEN YOU FINALIZE YOUR NOTE. IF YOU WANT TO FAX A PRELIMINARY NOTE YOU WILL NEED TO TOGGLE THIS TO 'NO' IF YOU DO NOT WANT IT IN YOUR FAXED NOTE. PAST MEDICAL HISTORY:  Afib     HLD (hyperlipidemia)     HTN (hypertension)       Prostate cancer

## 2024-09-25 NOTE — DIETITIAN NUTRITION RISK NOTIFICATION - TREATMENT: THE FOLLOWING DIET HAS BEEN RECOMMENDED
[de-identified] : Longstanding left shoulder pain significant weakness with rotator cuff testing.  He is a pain for many years.  She is extensive conservative care for greater than 6 weeks of physical therapy exercises anti-inflammatory medications without relief.  Will obtain an MRI to further evaluate rule out rotator cuff tear she will follow-up after MRI.  All questions answered
Diet, Regular (02-15-22 @ 16:08) [Active]

## 2024-11-27 NOTE — ED ADULT TRIAGE NOTE - BANDS:
Oriented - self; Oriented - place; Oriented - time Fall Risk; Allergy;

## 2025-03-26 NOTE — DISCHARGE NOTE PROVIDER - NSDCHHBASESERVICE_GEN_ALL_CORE
Nursing/Physical therapy
Patient seen for "Consult for MST score 2 or >, assessment", Source: Pt, Electronic Medical Record. Chart reviewed, events noted.